# Patient Record
Sex: FEMALE | Race: WHITE | Employment: FULL TIME | URBAN - METROPOLITAN AREA
[De-identification: names, ages, dates, MRNs, and addresses within clinical notes are randomized per-mention and may not be internally consistent; named-entity substitution may affect disease eponyms.]

---

## 2018-11-08 ENCOUNTER — HOSPITAL ENCOUNTER (OUTPATIENT)
Dept: MAMMOGRAPHY | Age: 68
Discharge: HOME | End: 2018-11-08
Attending: FAMILY MEDICINE
Payer: MEDICARE

## 2018-11-08 DIAGNOSIS — Z12.31 VISIT FOR SCREENING MAMMOGRAM: ICD-10-CM

## 2018-11-08 PROCEDURE — 77067 SCR MAMMO BI INCL CAD: CPT

## 2019-07-10 ENCOUNTER — HOSPITAL ENCOUNTER (OUTPATIENT)
Dept: GENERAL RADIOLOGY | Age: 69
Discharge: HOME | End: 2019-07-10
Payer: MEDICARE

## 2019-07-10 DIAGNOSIS — R06.00 DYSPNEA AND RESPIRATORY ABNORMALITY: ICD-10-CM

## 2019-07-10 DIAGNOSIS — R06.89 DYSPNEA AND RESPIRATORY ABNORMALITY: ICD-10-CM

## 2019-07-10 PROCEDURE — 71046 X-RAY EXAM CHEST 2 VIEWS: CPT

## 2019-07-23 ENCOUNTER — HOSPITAL ENCOUNTER (OUTPATIENT)
Age: 69
Discharge: HOME | End: 2019-07-24
Attending: INTERNAL MEDICINE | Admitting: INTERNAL MEDICINE
Payer: MEDICARE

## 2019-07-23 DIAGNOSIS — R07.9 CHEST PAIN, UNSPECIFIED TYPE: ICD-10-CM

## 2019-07-23 PROBLEM — I20.0 UNSTABLE ANGINA (HCC): Status: ACTIVE | Noted: 2019-07-23

## 2019-07-23 PROCEDURE — C1725 CATH, TRANSLUMIN NON-LASER: HCPCS | Performed by: INTERNAL MEDICINE

## 2019-07-23 PROCEDURE — C1894 INTRO/SHEATH, NON-LASER: HCPCS | Performed by: INTERNAL MEDICINE

## 2019-07-23 PROCEDURE — C1760 CLOSURE DEV, VASC: HCPCS | Performed by: INTERNAL MEDICINE

## 2019-07-23 PROCEDURE — 77030029065 HC DRSG HEMO QCLOT ZMED -B: Performed by: INTERNAL MEDICINE

## 2019-07-23 PROCEDURE — 93005 ELECTROCARDIOGRAM TRACING: CPT

## 2019-07-23 PROCEDURE — 74011000250 HC RX REV CODE- 250: Performed by: INTERNAL MEDICINE

## 2019-07-23 PROCEDURE — 93458 L HRT ARTERY/VENTRICLE ANGIO: CPT | Performed by: INTERNAL MEDICINE

## 2019-07-23 PROCEDURE — C1887 CATHETER, GUIDING: HCPCS | Performed by: INTERNAL MEDICINE

## 2019-07-23 PROCEDURE — 85347 COAGULATION TIME ACTIVATED: CPT

## 2019-07-23 PROCEDURE — 74011250637 HC RX REV CODE- 250/637: Performed by: INTERNAL MEDICINE

## 2019-07-23 PROCEDURE — C1769 GUIDE WIRE: HCPCS | Performed by: INTERNAL MEDICINE

## 2019-07-23 PROCEDURE — 77030019697 HC SYR ANGI INFL MRTM -B: Performed by: INTERNAL MEDICINE

## 2019-07-23 PROCEDURE — 99153 MOD SED SAME PHYS/QHP EA: CPT | Performed by: INTERNAL MEDICINE

## 2019-07-23 PROCEDURE — 77030028837 HC SYR ANGI PWR INJ COEU -A: Performed by: INTERNAL MEDICINE

## 2019-07-23 PROCEDURE — 74011250636 HC RX REV CODE- 250/636: Performed by: INTERNAL MEDICINE

## 2019-07-23 PROCEDURE — 74011636320 HC RX REV CODE- 636/320: Performed by: INTERNAL MEDICINE

## 2019-07-23 PROCEDURE — 99152 MOD SED SAME PHYS/QHP 5/>YRS: CPT | Performed by: INTERNAL MEDICINE

## 2019-07-23 PROCEDURE — 92928 PRQ TCAT PLMT NTRAC ST 1 LES: CPT | Performed by: INTERNAL MEDICINE

## 2019-07-23 PROCEDURE — 74011250636 HC RX REV CODE- 250/636

## 2019-07-23 PROCEDURE — C1874 STENT, COATED/COV W/DEL SYS: HCPCS | Performed by: INTERNAL MEDICINE

## 2019-07-23 DEVICE — STENT RONYX25012UX RESOLUTE ONYX 2.50X12
Type: IMPLANTABLE DEVICE | Site: CORONARY | Status: FUNCTIONAL
Brand: RESOLUTE ONYX™

## 2019-07-23 DEVICE — STENT RONYX25026UX RESOLUTE ONYX 2.50X26
Type: IMPLANTABLE DEVICE | Site: CORONARY | Status: FUNCTIONAL
Brand: RESOLUTE ONYX™

## 2019-07-23 DEVICE — STENT RONYX22538UX RESOLUTE ONYX 2.25X38
Type: IMPLANTABLE DEVICE | Site: CORONARY | Status: FUNCTIONAL
Brand: RESOLUTE ONYX™

## 2019-07-23 RX ORDER — FENTANYL CITRATE 50 UG/ML
INJECTION, SOLUTION INTRAMUSCULAR; INTRAVENOUS AS NEEDED
Status: DISCONTINUED | OUTPATIENT
Start: 2019-07-23 | End: 2019-07-23 | Stop reason: HOSPADM

## 2019-07-23 RX ORDER — CALCIUM CARBONATE 200(500)MG
1 TABLET,CHEWABLE ORAL DAILY
COMMUNITY

## 2019-07-23 RX ORDER — SODIUM CHLORIDE 9 MG/ML
100 INJECTION, SOLUTION INTRAVENOUS CONTINUOUS
Status: DISCONTINUED | OUTPATIENT
Start: 2019-07-23 | End: 2019-07-23

## 2019-07-23 RX ORDER — HEPARIN SODIUM 200 [USP'U]/100ML
INJECTION, SOLUTION INTRAVENOUS
Status: COMPLETED | OUTPATIENT
Start: 2019-07-23 | End: 2019-07-23

## 2019-07-23 RX ORDER — ASPIRIN 81 MG/1
81 TABLET ORAL DAILY
Status: DISCONTINUED | OUTPATIENT
Start: 2019-07-23 | End: 2019-07-23 | Stop reason: SDUPTHER

## 2019-07-23 RX ORDER — LIDOCAINE HYDROCHLORIDE 10 MG/ML
INJECTION, SOLUTION EPIDURAL; INFILTRATION; INTRACAUDAL; PERINEURAL AS NEEDED
Status: DISCONTINUED | OUTPATIENT
Start: 2019-07-23 | End: 2019-07-23 | Stop reason: HOSPADM

## 2019-07-23 RX ORDER — HEPARIN SODIUM 1000 [USP'U]/ML
INJECTION, SOLUTION INTRAVENOUS; SUBCUTANEOUS AS NEEDED
Status: DISCONTINUED | OUTPATIENT
Start: 2019-07-23 | End: 2019-07-23 | Stop reason: HOSPADM

## 2019-07-23 RX ORDER — ASPIRIN 81 MG/1
81 TABLET ORAL DAILY
COMMUNITY

## 2019-07-23 RX ORDER — ATORVASTATIN CALCIUM 20 MG/1
20 TABLET, FILM COATED ORAL
Status: DISCONTINUED | OUTPATIENT
Start: 2019-07-23 | End: 2019-07-24

## 2019-07-23 RX ORDER — ASPIRIN 81 MG/1
81 TABLET ORAL DAILY
Status: DISCONTINUED | OUTPATIENT
Start: 2019-07-23 | End: 2019-07-24 | Stop reason: HOSPADM

## 2019-07-23 RX ORDER — SODIUM CHLORIDE 0.9 % (FLUSH) 0.9 %
5-40 SYRINGE (ML) INJECTION AS NEEDED
Status: DISCONTINUED | OUTPATIENT
Start: 2019-07-23 | End: 2019-07-24 | Stop reason: HOSPADM

## 2019-07-23 RX ORDER — MIDAZOLAM HYDROCHLORIDE 1 MG/ML
INJECTION, SOLUTION INTRAMUSCULAR; INTRAVENOUS AS NEEDED
Status: DISCONTINUED | OUTPATIENT
Start: 2019-07-23 | End: 2019-07-23 | Stop reason: HOSPADM

## 2019-07-23 RX ORDER — GLUCOSAMINE SULFATE 1500 MG
POWDER IN PACKET (EA) ORAL DAILY
COMMUNITY

## 2019-07-23 RX ORDER — SODIUM CHLORIDE 0.9 % (FLUSH) 0.9 %
5-40 SYRINGE (ML) INJECTION EVERY 8 HOURS
Status: DISCONTINUED | OUTPATIENT
Start: 2019-07-23 | End: 2019-07-24 | Stop reason: HOSPADM

## 2019-07-23 RX ORDER — METOPROLOL SUCCINATE 25 MG/1
25 TABLET, EXTENDED RELEASE ORAL DAILY
COMMUNITY
End: 2020-01-01

## 2019-07-23 RX ORDER — ZOLPIDEM TARTRATE 5 MG/1
5 TABLET ORAL
Status: DISCONTINUED | OUTPATIENT
Start: 2019-07-23 | End: 2019-07-24 | Stop reason: HOSPADM

## 2019-07-23 RX ORDER — METOPROLOL SUCCINATE 25 MG/1
25 TABLET, EXTENDED RELEASE ORAL DAILY
Status: DISCONTINUED | OUTPATIENT
Start: 2019-07-23 | End: 2019-07-24 | Stop reason: HOSPADM

## 2019-07-23 RX ADMIN — ATORVASTATIN CALCIUM 20 MG: 20 TABLET, FILM COATED ORAL at 21:36

## 2019-07-23 RX ADMIN — Medication 10 ML: at 21:36

## 2019-07-23 RX ADMIN — TICAGRELOR 90 MG: 90 TABLET ORAL at 21:36

## 2019-07-23 RX ADMIN — SODIUM CHLORIDE 100 ML/HR: 900 INJECTION, SOLUTION INTRAVENOUS at 10:57

## 2019-07-23 NOTE — CARDIO/PULMONARY
Cardiac Rehab Note: chart review     Consult has been acknowledged     Smoking history assessed. Patient is a non smoker. EF not available at this time. Patient is still supine post procedure. CAD education folder, with heart heathy diet, warning signs, heart facts, catheterization brochure, and out patient cardiac rehab program provided to Ilya Murray on 7/23/19                                              Educated using teach back method. Reviewed CAD diagnosis definition and purpose of intervention. Discussed risk factors for CAD to include the following: family history, elevated BMI, hyperlipidemia, hypertension, diabetes, and stress. Discussed Heart Healthy/Low Sodium (less than 2000 mg) diet. Reviewed the importance of medication compliance, follow up appointments with cardiologist, signs and symptoms of angina, and what to report to physician after discharge. Spouse and daughter at bedside.  stated he has had cardiac cath before and has diabetes so they are familiar with heart appropriate diets. Emphasized the value of cardiac rehab. Discussed Cardiac Rehab Program format, benefits, and encouraged enrollment to assist with risk modification and management. Patient stated they will review information. Daughter was encouraging patient to participate and for spouse to attend classes too. CP Rehab will follow up by phone post discharge. Ilya Murray verbalized understanding with questions answered.   Olivia Loo RN

## 2019-07-23 NOTE — Clinical Note
Lesion: Located in the Proximal LAD. Stent inserted. Stent deployed. Multiple inflations used. First inflation pressure = 12 monika; inflation time: 20 sec. Second inflation pressure: 14 monika; inflation time: 15 sec.

## 2019-07-23 NOTE — Clinical Note
Lesion located in the Distal RCA. Balloon inserted. Balloon inflated using multiple inflations inflation technique. Pressure = 12 monika; Duration = 9 sec. Inflation 2: Pressure: 15 monika; Duration: 10 sec. Inflation 3: Pressure: 15 monika; Duration: 8 sec.

## 2019-07-23 NOTE — PROGRESS NOTES
1430: pt out of bed to bathroom after 4 hours of bedrest. Pt got back in bed, assessed R groin site. Slightly oozy with a small hematoma. Hand held pressure for 10 minutes, hematoma resolved. VS stable the entire time. Will continue to monitor closely.

## 2019-07-23 NOTE — Clinical Note
Lesion located in the Proximal LAD. Balloon inserted. Balloon inflated using multiple inflations inflation technique. Pressure = 10 monika; Duration = 22 sec.

## 2019-07-23 NOTE — PROCEDURES
PCI / stents RCA , Prox - mid with 2 overlapping Lawrence PRATIBHA to 2.5 mm.   99% ulcerated plaque,  0% residual.   SYDNEE 3. PCI / stent prox LAD . 80% eccentric  -  0% residual.  SYDNEE 3 flow. Angio seal.     No complications.

## 2019-07-23 NOTE — PROCEDURES
Procedure: Cardiac Catheterization. Date: 7/23/2019   Moderate sedation start time: 0858  Moderate sedation stop time: 0920  Sedation used: versed/fentanyl. Sedation was administered by a cath lab nurse; I directly supervised the cath lab staff as the patient was monitored for the duration of the procedure. INDICATION/PreDx: Angina; CAD; Abnl stress echo. PROCEDURES PERFORMED   1. Left heart catheterization. 2. Left ventriculography in DUONG projection. 3. Selective coronary angiography. DESCRIPTION OF PROCEDURE: After informed consent of all potential complications, the patient was prepped and draped in the usual sterile manner. Lidocaine 1% was utilized for local anesthesia. Conscious sedation per flow sheet. The right femoral artery was entered using a micropunture needle and a 5-Mongolian sheath placed without complication using modified Seldinger technique. The sheath was flushed at all catheter exchanges and all catheters were advanced over a guidewire under direct fluoroscopic visualization. Left coronary angiography was performed in multiple views using a 5-Mongolian JL4 catheter. Right coronary angiography was performed in multiple views using a JR4 catheter. Left heart catheterization and left ventriculography was performed in DUONG projection using a 5-Mongolian straight pigtail. No apparent complications. Estimated blood loss minimal. SPECIMENS: No specimens. No implants. FINDINGS  Coronary arteries and femoral arteries are noted to be calcified. LEFT MAIN:   Large vessel; 20-30% stenosis. LAD: Medium vessel; 75% eccentric proximal stenosis; Small diagonals. CIRCUMFLEX: Medium vessel; 20-30% proximal stenosis; Becomes a moderate extensive OM. RCA: Medium, dominant vessel; 70% and 95+% mid-RCA stenoses. Very small PDA and postero-lateral vessels. LV Gram: No focal WMA demonstrated; EF 65%. Minimal MR; no aortic stenosis on pullback. EDP 11. CONCLUSION/post procedure Dx:   1. Normal LV function. 2. CAD of LAD and RCA. PLAN: PCI ongoing of LAD/RCA.

## 2019-07-23 NOTE — Clinical Note
Multiple views of the left ventricle obtained using power injection. Total volume = 30 mL. Rate = 10 mL/sec. Pressure = 900 PSI.  Rate of rise = 1.8.

## 2019-07-23 NOTE — PROGRESS NOTES
Bedside and Verbal shift change report given to Tari Meier (oncoming nurse) by Iglesia Vargas  (offgoing nurse). Report included the following information SBAR, Kardex, Intake/Output, MAR, Accordion and Recent Results.

## 2019-07-23 NOTE — PROGRESS NOTES
7/23/2019 12:05 PM  Patient without complaints. Last VS:   Visit Vitals  /59 (BP 1 Location: Left arm, BP Patient Position: At rest)   Pulse (!) 52   Temp 97.4 °F (36.3 °C)   Resp 16   Ht 5' 3\" (1.6 m)   Wt 54.4 kg (120 lb)   SpO2 97%   BMI 21.26 kg/m²     Cath site without hematoma, bleeding or new bruit. Distal pulses at baseline. Continue current plan of care. Results of cath discussed with patient; No available family members.

## 2019-07-23 NOTE — PROGRESS NOTES
Cardiac Cath Lab Recovery Arrival Note:      Mary Taylor arrived to Cardiac Cath Lab, Recovery Area. Staff introduced to patient. Patient identifiers verified with NAME and DATE OF BIRTH. Procedure verified with patient. Consent forms reviewed and signed by patient or authorized representative and verified. Allergies verified. Patient and family oriented to department. Patient and family informed of procedure and plan of care. Questions answered with review. Patient prepped for procedure, per orders from physician, prior to arrival.    Patient on cardiac monitor, non-invasive blood pressure, SPO2 monitor. Patient is A&Ox 4. Patient reports no complaints. Patient in stretcher, in low position, with side rails up, call bell within reach, patient instructed to call if assistance as needed. Patient prep in: [address], [place] [address].      Family in: [facility]    Prep by: Americo Greenberg Rn and Shawna Cordova RN

## 2019-07-23 NOTE — Clinical Note
Lesion located in the Distal RCA. Balloon inserted. Balloon inflated using multiple inflations inflation technique. Pressure = 8 monika; Duration = 11 sec. Inflation 2: Pressure: 8 monika; Duration: 11 sec. Inflation 3: Pressure: 12 monika; Duration: 14 sec. Inflation 4: Pressure: 12 monika; Duration: 12 sec.

## 2019-07-23 NOTE — Clinical Note
Lesion: Located in the Proximal RCA. Stent inserted. Stent deployed. Multiple inflations used. First inflation pressure = 15 monika; inflation time: 18 sec. Second inflation pressure: 15 monika; inflation time: 15 sec. Third inflation pressure: 15 monika; inflation time: 10 sec. Fourth inflation pressure: 15 monika; inflation time: 13 sec. Fifth inflation pressure: 15 monika; inflation time: 10 sec.

## 2019-07-23 NOTE — Clinical Note
TRANSFER - OUT REPORT:  
 
Verbal report given to: Rubi Madrid RN. Report consisted of patient's Situation, Background, Assessment and  
Recommendations(SBAR). Opportunity for questions and clarification was provided. Patient transported with a Registered Nurse and [address] [place] [address] / [facility]. Patient transported to: IVCU.

## 2019-07-23 NOTE — Clinical Note
Lesion: Located in the Distal RCA. Stent inserted. Stent deployed. Single technique used. First inflation pressure = 15 monika; inflation time: 18 sec.

## 2019-07-24 VITALS
BODY MASS INDEX: 21.26 KG/M2 | SYSTOLIC BLOOD PRESSURE: 126 MMHG | WEIGHT: 120 LBS | RESPIRATION RATE: 20 BRPM | HEIGHT: 63 IN | OXYGEN SATURATION: 99 % | HEART RATE: 61 BPM | TEMPERATURE: 98.2 F | DIASTOLIC BLOOD PRESSURE: 73 MMHG

## 2019-07-24 LAB
ANION GAP SERPL CALC-SCNC: 5 MMOL/L (ref 5–15)
BASOPHILS # BLD: 0 K/UL (ref 0–0.1)
BASOPHILS NFR BLD: 0 % (ref 0–1)
BUN SERPL-MCNC: 16 MG/DL (ref 6–20)
BUN/CREAT SERPL: 18 (ref 12–20)
CALCIUM SERPL-MCNC: 7.7 MG/DL (ref 8.5–10.1)
CHLORIDE SERPL-SCNC: 110 MMOL/L (ref 97–108)
CO2 SERPL-SCNC: 27 MMOL/L (ref 21–32)
COMMENT, HOLDF: NORMAL
CREAT SERPL-MCNC: 0.89 MG/DL (ref 0.55–1.02)
DIFFERENTIAL METHOD BLD: ABNORMAL
EOSINOPHIL # BLD: 0.1 K/UL (ref 0–0.4)
EOSINOPHIL NFR BLD: 1 % (ref 0–7)
ERYTHROCYTE [DISTWIDTH] IN BLOOD BY AUTOMATED COUNT: 12.7 % (ref 11.5–14.5)
GLUCOSE SERPL-MCNC: 104 MG/DL (ref 65–100)
HCT VFR BLD AUTO: 37.7 % (ref 35–47)
HGB BLD-MCNC: 12.7 G/DL (ref 11.5–16)
IMM GRANULOCYTES # BLD AUTO: 0 K/UL (ref 0–0.04)
IMM GRANULOCYTES NFR BLD AUTO: 0 % (ref 0–0.5)
LYMPHOCYTES # BLD: 0.7 K/UL (ref 0.8–3.5)
LYMPHOCYTES NFR BLD: 8 % (ref 12–49)
MCH RBC QN AUTO: 30.7 PG (ref 26–34)
MCHC RBC AUTO-ENTMCNC: 33.7 G/DL (ref 30–36.5)
MCV RBC AUTO: 91.1 FL (ref 80–99)
MONOCYTES # BLD: 0.7 K/UL (ref 0–1)
MONOCYTES NFR BLD: 8 % (ref 5–13)
NEUTS SEG # BLD: 7.7 K/UL (ref 1.8–8)
NEUTS SEG NFR BLD: 83 % (ref 32–75)
NRBC # BLD: 0 K/UL (ref 0–0.01)
NRBC BLD-RTO: 0 PER 100 WBC
PLATELET # BLD AUTO: 150 K/UL (ref 150–400)
PMV BLD AUTO: 11.4 FL (ref 8.9–12.9)
POTASSIUM SERPL-SCNC: 3.6 MMOL/L (ref 3.5–5.1)
RBC # BLD AUTO: 4.14 M/UL (ref 3.8–5.2)
RBC MORPH BLD: ABNORMAL
SAMPLES BEING HELD,HOLD: NORMAL
SODIUM SERPL-SCNC: 142 MMOL/L (ref 136–145)
WBC # BLD AUTO: 9.2 K/UL (ref 3.6–11)

## 2019-07-24 PROCEDURE — 74011250637 HC RX REV CODE- 250/637: Performed by: INTERNAL MEDICINE

## 2019-07-24 PROCEDURE — 80048 BASIC METABOLIC PNL TOTAL CA: CPT

## 2019-07-24 PROCEDURE — 36415 COLL VENOUS BLD VENIPUNCTURE: CPT

## 2019-07-24 PROCEDURE — 85025 COMPLETE CBC W/AUTO DIFF WBC: CPT

## 2019-07-24 RX ORDER — CLOPIDOGREL BISULFATE 75 MG/1
75 TABLET ORAL DAILY
Qty: 30 TAB | Refills: 12 | Status: SHIPPED | OUTPATIENT
Start: 2019-07-25

## 2019-07-24 RX ORDER — DIPHENHYDRAMINE HCL 25 MG
25 CAPSULE ORAL
Status: COMPLETED | OUTPATIENT
Start: 2019-07-24 | End: 2019-07-24

## 2019-07-24 RX ORDER — DIPHENHYDRAMINE HCL 25 MG
25 CAPSULE ORAL
Status: DISCONTINUED | OUTPATIENT
Start: 2019-07-24 | End: 2019-07-24 | Stop reason: HOSPADM

## 2019-07-24 RX ORDER — CLOPIDOGREL BISULFATE 75 MG/1
150 TABLET ORAL
Status: COMPLETED | OUTPATIENT
Start: 2019-07-24 | End: 2019-07-24

## 2019-07-24 RX ORDER — DIPHENHYDRAMINE HCL 25 MG
25 CAPSULE ORAL
Qty: 20 CAP | Refills: 0 | Status: SHIPPED
Start: 2019-07-24 | End: 2020-01-01

## 2019-07-24 RX ORDER — ATORVASTATIN CALCIUM 20 MG/1
20 TABLET, FILM COATED ORAL
Qty: 30 TAB | Refills: 12 | Status: SHIPPED | OUTPATIENT
Start: 2019-07-24 | End: 2019-07-24

## 2019-07-24 RX ORDER — CLOPIDOGREL BISULFATE 75 MG/1
75 TABLET ORAL DAILY
Status: DISCONTINUED | OUTPATIENT
Start: 2019-07-25 | End: 2019-07-24 | Stop reason: HOSPADM

## 2019-07-24 RX ADMIN — CLOPIDOGREL BISULFATE 150 MG: 75 TABLET ORAL at 08:36

## 2019-07-24 RX ADMIN — Medication 10 ML: at 02:53

## 2019-07-24 RX ADMIN — UMECLIDINIUM 1 PUFF: 62.5 AEROSOL, POWDER ORAL at 07:27

## 2019-07-24 RX ADMIN — ASPIRIN 81 MG: 81 TABLET ORAL at 07:27

## 2019-07-24 RX ADMIN — DIPHENHYDRAMINE HYDROCHLORIDE 25 MG: 25 CAPSULE ORAL at 08:36

## 2019-07-24 RX ADMIN — METOPROLOL SUCCINATE 25 MG: 25 TABLET, EXTENDED RELEASE ORAL at 07:27

## 2019-07-24 NOTE — PROGRESS NOTES
1900 - Bedside report from [place]. NSR / VSS. No complaints. R groin bruised, puffy, no hematoma. Tender to touch. +PPP. Up in chair, voiding qs. 2030 - Back to bed.      0700 - Bedside report to RN.   NSR.

## 2019-07-24 NOTE — DISCHARGE SUMMARY
7/24/2019 1:43 PM  Patient without complaints. CAD: stable s/p PRATIBHA RCA/LAD. Cont asa; changed brilinta to plavix due to new rash.     HTN: stable; cont low dose bblocker.     Rhythm: sinus     Volume status:euvolemic  Renal function: stable     Rash: c/w drug rash (either brilinta or lipitor: stopped both); plavix as above; restart statin as outpt. Improved after benadryl, cont prn. Subjective: Tan Muller reports   Chest pain X none  consistent with:  Non-cardiac CP         Atypical CP     None now  On going  Anginal CP     Dyspnea X none  at rest  with exertion         improved  unchanged  worse              PND X none  overnight       Orthopnea X none  improved  unchanged  worse   Presyncope X none  improved  unchanged  worse     Ambulated in hallway without symptoms   Yes   Ambulated in room without symptoms  Yes     PE: Last VS:   Visit Vitals  /73 (BP 1 Location: Right arm, BP Patient Position: At rest)   Pulse 61   Temp 98.2 °F (36.8 °C)   Resp 20   Ht 5' 3\" (1.6 m)   Wt 54.4 kg (120 lb)   SpO2 99%   BMI 21.26 kg/m²     CTA, normal resp effort  RRR no new murmur  abd benign  A/O, non-focal    Cath site without hematoma, bleeding or new bruit. Distal pulses at baseline. Telemetry independently reviewed x sinus  chronic afib  parox afib  NSVT     ECG independently reviewed  NSR  afib  no significant changes  NSST-Tw chgs   x no new ECG provided for review     Recent Labs     07/24/19  0246      K 3.6   BUN 16   CREA 0.89   WBC 9.2   HGB 12.7   HCT 37.7            Plan: D/C.   Follow-up Information     Follow up With Specialties Details Why Contact Info    Ana María Em MD [address]  [phone]      Karrie Meyer MD Cardiology Schedule an appointment as soon as possible for a visit in 1 month  [address]  [address]  [facility] [address] [phone]            Current Discharge Medication List      START taking these medications    Details   clopidogrel (PLAVIX) 75 mg tab Take 1 Tab by mouth daily. Qty: 30 Tab, Refills: 12      diphenhydrAMINE (BENADRYL) 25 mg capsule Take 1 Cap by mouth every six (6) hours as needed for Itching. Qty: 20 Cap, Refills: 0         CONTINUE these medications which have NOT CHANGED    Details   aspirin delayed-release 81 mg tablet Take 81 mg by mouth daily. metoprolol succinate (TOPROL-XL) 25 mg XL tablet Take 25 mg by mouth daily. cholecalciferol (VITAMIN D3) 1,000 unit cap Take  by mouth daily. calcium carbonate (TUMS) 200 mg calcium (500 mg) chew Take 1 Tab by mouth daily. tiotropium bromide (SPIRIVA RESPIMAT) 2.5 mcg/actuation inhaler Take 2 Puffs by inhalation daily.

## 2019-07-24 NOTE — DISCHARGE INSTRUCTIONS
[address], [address]    [phone]  [place], [place] [address]    [email or web address]    Patient Discharge Instructions    Lennart Cowden / [id number] : 1950    Admitted 2019 Discharged 2019     · It is important that you take the medication exactly as they are prescribed. · Keep your medication in the bottles provided by the pharmacist and keep a list of the medication names, dosages, and times to be taken in your wallet. · Do not take other medications without consulting your doctor. BRING ALL OF YOUR MEDICINES or a list of medicines with dosages TO YOUR OFFICE VISIT with Dr. Deya Mccoy. Cardiac Catheterization  Discharge Instructions     Do not drive, operate any machinery, or sign any legal documents for 24 hours after your procedure. You must have someone to drive you home.  You may take a shower 24 hours after your cardiac catheterization. Be sure to get the dressing wet and then remove it; gently wash the area with warm soapy water. Pat dry and leave open to air. To help prevent infections, be sure to keep the cath site clean and dry. No lotions, creams, powders, ointments, etc. in the cath site for approximately 1 week.  Do not take a tub bath, get in a hot tub or swimming pool for approximately 5 days or until the cath site is completely healed.  No strenuous activity or heavy lifting over 10 lbs. for 7 days.  Drink plenty of fluids for 24-48 hours after your cath to flush the contrast dye from your kidneys. No alcoholic beverages for 24 hours. You may resume your previous diet (low fat, low cholesterol) after your cath.  After your cath, some bruising or discomfort is common during the healing process. Tylenol, 1-2 tablets every 6 hours as needed, is recommended if you experience any discomfort.   If you experience any signs or symptoms of infection such as fever, chills, or poorly healing incision, persistent tenderness or swelling in the groin, redness and/or warmth to the touch, numbness, significant tingling or pain at the groin site or affected extremity, rash, drainage from the cath site, or if the leg feels tight or swollen, call your physician right away.  If bleeding at the cath site occurs, take a clean gauze pad and apply direct pressure to the groin just above the puncture site. Call 911 immediately, and continue to apply direct pressure until an ambulance gets to your location.  You may return to work  2  days after your cardiac cath if no groin bleeding. If Plavix, or Brilinta, or Effient is prescribed with your aspirin, you must take them to prevent your stent from clotting. You will likely need them for at least 1 to 2 years. If you are smoking, please stop. Smoking Cessation Program is a free, phone/text/email based, smoking cessation program. The program is individualized to meet each patient's needs. To enroll use this link [email or web address]      Follow-up:   Follow-up Information     Follow up With Specialties Details Why Contact Info    Jazmín Feliciano MD [address]  [phone]      Manuel Sorensen MD Cardiology Schedule an appointment as soon as possible for a visit in 1 month  [address]  [address]  [address] [phone]            Information obtained by :  I understand that if any problems occur once I am at home I am to contact my physician. I understand and acknowledge receipt of the instructions indicated above.                                                                                                                                            R.N.'s Signature                                                                  Date/Time                                                                                                                                              Patient or Representative Signature                                                          Date/Time      Steffi oRblero MD      [address]    [phone]  [facility] [facility], [address]    [email or web address]

## 2019-07-24 NOTE — PROGRESS NOTES
Cardiology Progress Note  2019     Admit Date: 2019  Admit Diagnosis: Chest pain, unspecified type [R07.9]  [facility] [I20.0]  CC: none currently  Cardiac Assessment/Plan:   CAD: stable s/p PRATIBHA RCA/LAD. Cont asa; change brilinta to plavix due to new rash. HTN: stable; cont low dose bblocker. Rhythm: sinus    Volume status:euvolemic  Renal function: stable    Rash: c/w drug rash (either brilinta or lipitor: stop both); plavix as above; restart statin as outpt. Benadryl now and prn. Dispo: ? D/C this afternoon   For other plans, see orders.   Hospital problem list   Active Hospital Problems    Diagnosis Date Noted    Unstable angina ([facility] 2019        Subjective: Shruti Jane reports   Chest pain X none  consistent with:  Non-cardiac CP         Atypical CP     None now  On going  Anginal CP     Dyspnea X none  at rest  with exertion         improved  unchanged  worse              PND X none  overnight       Orthopnea X none  improved  unchanged  worse   Presyncope X none  improved  unchanged  worse     Ambulated in hallway without symptoms  x Yes   Ambulated in room without symptoms x Yes   Objective:    Physical Exam:  Overall VSSAF;    Visit Vitals  /70 (BP 1 Location: Right arm, BP Patient Position: Post activity) Comment (BP Patient Position): just finished walking in the hallway & doctor was in room   Pulse 65   Temp 98.2 °F (36.8 °C)   Resp 20   Ht 5' 3\" (1.6 m)   Wt 54.4 kg (120 lb)   SpO2 100%   BMI 21.26 kg/m²     Temp (24hrs), Av.1 °F (36.7 °C), Min:97.4 °F (36.3 °C), Max:98.5 °F (36.9 °C)    Patient Vitals for the past 8 hrs:   Pulse   19 0751 65   19 0259 61     Patient Vitals for the past 8 hrs:   Resp   19 0751 20   19 0259 16     Patient Vitals for the past 8 hrs:   BP   19 0751 154/70   19 0259 125/63       Intake/Output Summary (Last 24 hours) at 2019 0756  Last data filed at 2019 0259  Gross per 24 hour   Intake 1020 ml   Output 1850 ml   Net -830 ml     General Appearance: Well developed, well nourished, no acute distress. Ears/Nose/Mouth/Throat:   Normal MM; anicteric. JVP: WNL   Resp:   clear to auscultation bilaterally. Nl resp effort. Cardiovascular:  RRR, S1, S2 normal, no new murmur. No gallop or rub. Abdomen:   Soft, non-tender, bowel sounds are present. Extremities: No edema bilaterally. Skin:  Neuro: Warm and dry. A/O x3, grossly nonfocal   cath site intact w/o hematoma or new bruit; distal pulse unchanged; ecchymosis noted. x Yes   Data Review:     Telemetry independently reviewed x sinus  chronic afib  parox afib  NSVT     ECG independently reviewed  NSR  afib  no significant changes  NSST-Tw chgs   x no new ECG provided for review   Lab results reviewed as noted below. Current medications reviewed as noted below. No results for input(s): PH, PCO2, PO2 in the last 72 hours. No results for input(s): CPK, CKMB, CKNDX, TROIQ in the last 72 hours. Recent Labs     07/24/19  0246      K 3.6   *   CO2 27   BUN 16   CREA 0.89   GFRAA >60   *   CA 7.7*   WBC 9.2   HGB 12.7   HCT 37.7        No results found for: CHOL, CHOLX, CHLST, CHOLV, HDL, LDL, LDLC, DLDLP, TGLX, TRIGL, TRIGP, CHHD, CHHDX  No results for input(s): SGOT, GPT, AP, TBIL, TP, ALB, GLOB, GGT, AML, LPSE in the last 72 hours. No lab exists for component: AMYP, HLPSE  No results for input(s): INR, PTP, APTT in the last 72 hours.     No lab exists for component: INREXT   No components found for: [place]    Current Facility-Administered Medications   Medication Dose Route Frequency    clopidogrel (PLAVIX) tablet 150 mg  150 mg Oral NOW    [START ON 7/25/2019] clopidogrel (PLAVIX) tablet 75 mg  75 mg Oral DAILY    diphenhydrAMINE (BENADRYL) capsule 25 mg  25 mg Oral Q6H PRN    diphenhydrAMINE (BENADRYL) capsule 25 mg  25 mg Oral NOW    metoprolol succinate (TOPROL-XL) XL tablet 25 mg  25 mg Oral DAILY    umeclidinium (INCRUSE ELLIPTA) 62.5 mcg/actuation  1 Puff Inhalation DAILY    sodium chloride (NS) flush 5-40 mL  5-40 mL IntraVENous Q8H    sodium chloride (NS) flush 5-40 mL  5-40 mL IntraVENous PRN    zolpidem (AMBIEN) tablet 5 mg  5 mg Oral QHS PRN    aspirin delayed-release tablet 81 mg  81 mg Oral DAILY        Leroy Denny MD

## 2019-07-25 LAB
ACT BLD: 235 SECS (ref 79–138)
ACT BLD: 296 SECS (ref 79–138)

## 2019-07-31 LAB
ATRIAL RATE: 56 BPM
CALCULATED P AXIS, ECG09: 82 DEGREES
CALCULATED R AXIS, ECG10: 73 DEGREES
CALCULATED T AXIS, ECG11: 67 DEGREES
DIAGNOSIS, 93000: NORMAL
P-R INTERVAL, ECG05: 138 MS
Q-T INTERVAL, ECG07: 514 MS
QRS DURATION, ECG06: 112 MS
QTC CALCULATION (BEZET), ECG08: 496 MS
VENTRICULAR RATE, ECG03: 56 BPM

## 2019-08-05 ENCOUNTER — TELEPHONE (OUTPATIENT)
Dept: CARDIAC REHAB | Age: 69
End: 2019-08-05

## 2020-01-01 ENCOUNTER — OFFICE VISIT (OUTPATIENT)
Dept: ONCOLOGY | Age: 70
End: 2020-01-01
Payer: MEDICARE

## 2020-01-01 ENCOUNTER — OFFICE VISIT (OUTPATIENT)
Dept: ONCOLOGY | Age: 70
End: 2020-01-01

## 2020-01-01 ENCOUNTER — HOSPITAL ENCOUNTER (OUTPATIENT)
Dept: INFUSION THERAPY | Age: 70
Discharge: HOME | End: 2020-08-31
Payer: MEDICARE

## 2020-01-01 ENCOUNTER — HOSPITAL ENCOUNTER (OUTPATIENT)
Dept: INFUSION THERAPY | Age: 70
Discharge: HOME | End: 2020-09-02
Payer: MEDICARE

## 2020-01-01 ENCOUNTER — HOSPITAL ENCOUNTER (OUTPATIENT)
Dept: INFUSION THERAPY | Age: 70
Discharge: HOME | End: 2020-07-21
Payer: MEDICARE

## 2020-01-01 ENCOUNTER — HOSPITAL ENCOUNTER (OUTPATIENT)
Dept: INFUSION THERAPY | Age: 70
Discharge: HOME | End: 2020-08-11
Payer: MEDICARE

## 2020-01-01 ENCOUNTER — HOSPITAL ENCOUNTER (OUTPATIENT)
Dept: INFUSION THERAPY | Age: 70
Discharge: HOME | End: 2020-10-14
Payer: MEDICARE

## 2020-01-01 ENCOUNTER — HOSPITAL ENCOUNTER (OUTPATIENT)
Dept: INFUSION THERAPY | Age: 70
Discharge: HOME | End: 2020-10-12
Payer: MEDICARE

## 2020-01-01 ENCOUNTER — HOSPITAL ENCOUNTER (OUTPATIENT)
Dept: INFUSION THERAPY | Age: 70
Discharge: HOME | End: 2020-09-23
Payer: MEDICARE

## 2020-01-01 ENCOUNTER — HOSPITAL ENCOUNTER (OUTPATIENT)
Dept: NUCLEAR MEDICINE | Age: 70
Discharge: HOME | End: 2020-06-29
Attending: INTERNAL MEDICINE
Payer: MEDICARE

## 2020-01-01 ENCOUNTER — HOSPITAL ENCOUNTER (OUTPATIENT)
Dept: INFUSION THERAPY | Age: 70
Discharge: HOME | End: 2020-09-21
Payer: MEDICARE

## 2020-01-01 ENCOUNTER — HOSPITAL ENCOUNTER (OUTPATIENT)
Dept: INFUSION THERAPY | Age: 70
Discharge: HOME | End: 2020-10-13
Payer: MEDICARE

## 2020-01-01 ENCOUNTER — HOSPITAL ENCOUNTER (OUTPATIENT)
Dept: INFUSION THERAPY | Age: 70
Discharge: HOME | End: 2020-12-02
Payer: MEDICARE

## 2020-01-01 ENCOUNTER — HOSPITAL ENCOUNTER (OUTPATIENT)
Dept: INTERVENTIONAL RADIOLOGY/VASCULAR | Age: 70
Discharge: HOME | End: 2020-06-22
Attending: INTERNAL MEDICINE
Payer: MEDICARE

## 2020-01-01 ENCOUNTER — APPOINTMENT (OUTPATIENT)
Dept: CT IMAGING | Age: 70
End: 2020-01-01
Attending: INTERNAL MEDICINE
Payer: MEDICARE

## 2020-01-01 ENCOUNTER — DOCUMENTATION ONLY (OUTPATIENT)
Dept: ONCOLOGY | Age: 70
End: 2020-01-01

## 2020-01-01 ENCOUNTER — HOSPITAL ENCOUNTER (OUTPATIENT)
Dept: NUCLEAR MEDICINE | Age: 70
Discharge: HOME | End: 2020-11-12
Attending: INTERNAL MEDICINE
Payer: MEDICARE

## 2020-01-01 ENCOUNTER — TELEPHONE (OUTPATIENT)
Dept: ONCOLOGY | Age: 70
End: 2020-01-01

## 2020-01-01 ENCOUNTER — APPOINTMENT (OUTPATIENT)
Dept: INFUSION THERAPY | Age: 70
End: 2020-01-01
Payer: MEDICARE

## 2020-01-01 ENCOUNTER — HOSPITAL ENCOUNTER (OUTPATIENT)
Dept: INFUSION THERAPY | Age: 70
Discharge: HOME | End: 2020-09-01
Payer: MEDICARE

## 2020-01-01 ENCOUNTER — HOSPITAL ENCOUNTER (OUTPATIENT)
Dept: INFUSION THERAPY | Age: 70
Discharge: HOME | End: 2020-07-22
Payer: MEDICARE

## 2020-01-01 ENCOUNTER — HOSPITAL ENCOUNTER (OUTPATIENT)
Dept: INFUSION THERAPY | Age: 70
Discharge: HOME | End: 2020-11-02
Payer: MEDICARE

## 2020-01-01 ENCOUNTER — HOSPITAL ENCOUNTER (OUTPATIENT)
Dept: INFUSION THERAPY | Age: 70
Discharge: HOME | End: 2020-08-12
Payer: MEDICARE

## 2020-01-01 ENCOUNTER — HOSPITAL ENCOUNTER (OUTPATIENT)
Dept: CT IMAGING | Age: 70
Discharge: HOME | End: 2020-08-21
Attending: INTERNAL MEDICINE
Payer: MEDICARE

## 2020-01-01 ENCOUNTER — HOSPITAL ENCOUNTER (OUTPATIENT)
Dept: INFUSION THERAPY | Age: 70
Discharge: HOME | End: 2020-07-01
Payer: MEDICARE

## 2020-01-01 ENCOUNTER — APPOINTMENT (OUTPATIENT)
Dept: INFUSION THERAPY | Age: 70
End: 2020-01-01

## 2020-01-01 ENCOUNTER — HOSPITAL ENCOUNTER (OUTPATIENT)
Dept: ULTRASOUND IMAGING | Age: 70
Discharge: HOME | End: 2020-06-22
Attending: INTERNAL MEDICINE
Payer: MEDICARE

## 2020-01-01 ENCOUNTER — HOSPITAL ENCOUNTER (OUTPATIENT)
Dept: INFUSION THERAPY | Age: 70
Discharge: HOME | End: 2020-07-02
Payer: MEDICARE

## 2020-01-01 ENCOUNTER — HOSPITAL ENCOUNTER (OUTPATIENT)
Dept: MRI IMAGING | Age: 70
Discharge: HOME | End: 2020-06-27
Attending: INTERNAL MEDICINE
Payer: MEDICARE

## 2020-01-01 ENCOUNTER — HOSPITAL ENCOUNTER (OUTPATIENT)
Dept: INFUSION THERAPY | Age: 70
Discharge: HOME | End: 2020-09-22
Payer: MEDICARE

## 2020-01-01 ENCOUNTER — HOSPITAL ENCOUNTER (OUTPATIENT)
Dept: CT IMAGING | Age: 70
Discharge: HOME | End: 2020-11-12
Attending: INTERNAL MEDICINE
Payer: MEDICARE

## 2020-01-01 ENCOUNTER — HOSPITAL ENCOUNTER (OUTPATIENT)
Dept: INFUSION THERAPY | Age: 70
Discharge: HOME | End: 2020-12-30
Payer: MEDICARE

## 2020-01-01 ENCOUNTER — HOSPITAL ENCOUNTER (OUTPATIENT)
Dept: INFUSION THERAPY | Age: 70
Discharge: HOME | End: 2020-08-10
Payer: MEDICARE

## 2020-01-01 ENCOUNTER — HOSPITAL ENCOUNTER (OUTPATIENT)
Dept: INFUSION THERAPY | Age: 70
Discharge: HOME | End: 2020-07-20
Payer: MEDICARE

## 2020-01-01 ENCOUNTER — HOSPITAL ENCOUNTER (OUTPATIENT)
Dept: INFUSION THERAPY | Age: 70
Discharge: HOME | End: 2020-06-30
Payer: MEDICARE

## 2020-01-01 VITALS
OXYGEN SATURATION: 98 % | WEIGHT: 110.1 LBS | BODY MASS INDEX: 19.51 KG/M2 | SYSTOLIC BLOOD PRESSURE: 128 MMHG | DIASTOLIC BLOOD PRESSURE: 73 MMHG | HEIGHT: 63 IN | RESPIRATION RATE: 16 BRPM | HEART RATE: 70 BPM | TEMPERATURE: 97.7 F

## 2020-01-01 VITALS
HEART RATE: 74 BPM | SYSTOLIC BLOOD PRESSURE: 148 MMHG | TEMPERATURE: 98.3 F | WEIGHT: 110 LBS | HEIGHT: 63 IN | BODY MASS INDEX: 19.49 KG/M2 | RESPIRATION RATE: 16 BRPM | DIASTOLIC BLOOD PRESSURE: 74 MMHG

## 2020-01-01 VITALS
WEIGHT: 112.2 LBS | SYSTOLIC BLOOD PRESSURE: 119 MMHG | BODY MASS INDEX: 19.88 KG/M2 | TEMPERATURE: 98.1 F | RESPIRATION RATE: 18 BRPM | HEART RATE: 70 BPM | DIASTOLIC BLOOD PRESSURE: 71 MMHG | HEIGHT: 63 IN

## 2020-01-01 VITALS
RESPIRATION RATE: 18 BRPM | BODY MASS INDEX: 19.67 KG/M2 | OXYGEN SATURATION: 100 % | WEIGHT: 111 LBS | TEMPERATURE: 97 F | HEIGHT: 63 IN | HEART RATE: 72 BPM | DIASTOLIC BLOOD PRESSURE: 79 MMHG | SYSTOLIC BLOOD PRESSURE: 154 MMHG

## 2020-01-01 VITALS
TEMPERATURE: 97.6 F | RESPIRATION RATE: 18 BRPM | HEIGHT: 63 IN | SYSTOLIC BLOOD PRESSURE: 138 MMHG | DIASTOLIC BLOOD PRESSURE: 83 MMHG | BODY MASS INDEX: 19.28 KG/M2 | HEART RATE: 83 BPM | WEIGHT: 108.8 LBS

## 2020-01-01 VITALS
DIASTOLIC BLOOD PRESSURE: 85 MMHG | HEART RATE: 63 BPM | OXYGEN SATURATION: 95 % | WEIGHT: 127.3 LBS | BODY MASS INDEX: 22.55 KG/M2 | SYSTOLIC BLOOD PRESSURE: 155 MMHG | HEIGHT: 63 IN | TEMPERATURE: 97.8 F | RESPIRATION RATE: 16 BRPM

## 2020-01-01 VITALS
HEART RATE: 67 BPM | TEMPERATURE: 97.1 F | SYSTOLIC BLOOD PRESSURE: 117 MMHG | WEIGHT: 116.1 LBS | OXYGEN SATURATION: 97 % | HEIGHT: 63 IN | BODY MASS INDEX: 20.57 KG/M2 | RESPIRATION RATE: 18 BRPM | DIASTOLIC BLOOD PRESSURE: 73 MMHG

## 2020-01-01 VITALS
OXYGEN SATURATION: 98 % | HEART RATE: 70 BPM | HEIGHT: 63 IN | TEMPERATURE: 96.8 F | WEIGHT: 110 LBS | SYSTOLIC BLOOD PRESSURE: 159 MMHG | RESPIRATION RATE: 16 BRPM | BODY MASS INDEX: 19.49 KG/M2 | DIASTOLIC BLOOD PRESSURE: 78 MMHG

## 2020-01-01 VITALS
DIASTOLIC BLOOD PRESSURE: 74 MMHG | RESPIRATION RATE: 18 BRPM | OXYGEN SATURATION: 97 % | HEIGHT: 63 IN | BODY MASS INDEX: 19.56 KG/M2 | WEIGHT: 110.4 LBS | SYSTOLIC BLOOD PRESSURE: 121 MMHG | TEMPERATURE: 98.1 F | HEART RATE: 65 BPM

## 2020-01-01 VITALS
TEMPERATURE: 97.7 F | HEIGHT: 63 IN | RESPIRATION RATE: 18 BRPM | WEIGHT: 111 LBS | DIASTOLIC BLOOD PRESSURE: 63 MMHG | OXYGEN SATURATION: 96 % | BODY MASS INDEX: 19.67 KG/M2 | HEART RATE: 92 BPM | SYSTOLIC BLOOD PRESSURE: 133 MMHG

## 2020-01-01 VITALS
TEMPERATURE: 97.1 F | WEIGHT: 116 LBS | HEIGHT: 63 IN | DIASTOLIC BLOOD PRESSURE: 69 MMHG | SYSTOLIC BLOOD PRESSURE: 122 MMHG | BODY MASS INDEX: 20.55 KG/M2 | OXYGEN SATURATION: 97 % | RESPIRATION RATE: 18 BRPM | HEART RATE: 84 BPM

## 2020-01-01 VITALS
SYSTOLIC BLOOD PRESSURE: 126 MMHG | TEMPERATURE: 97.8 F | DIASTOLIC BLOOD PRESSURE: 73 MMHG | BODY MASS INDEX: 19.84 KG/M2 | HEIGHT: 63 IN | HEART RATE: 71 BPM | WEIGHT: 112 LBS | RESPIRATION RATE: 16 BRPM

## 2020-01-01 VITALS
DIASTOLIC BLOOD PRESSURE: 77 MMHG | BODY MASS INDEX: 19.76 KG/M2 | HEART RATE: 77 BPM | RESPIRATION RATE: 16 BRPM | SYSTOLIC BLOOD PRESSURE: 122 MMHG | HEIGHT: 63 IN | WEIGHT: 111.5 LBS | TEMPERATURE: 97.8 F

## 2020-01-01 VITALS
TEMPERATURE: 97.7 F | HEART RATE: 90 BPM | OXYGEN SATURATION: 98 % | DIASTOLIC BLOOD PRESSURE: 69 MMHG | HEIGHT: 63 IN | RESPIRATION RATE: 18 BRPM | BODY MASS INDEX: 19.44 KG/M2 | SYSTOLIC BLOOD PRESSURE: 129 MMHG | WEIGHT: 109.7 LBS

## 2020-01-01 VITALS
HEART RATE: 63 BPM | HEIGHT: 63 IN | SYSTOLIC BLOOD PRESSURE: 155 MMHG | BODY MASS INDEX: 22.36 KG/M2 | RESPIRATION RATE: 18 BRPM | WEIGHT: 126.2 LBS | TEMPERATURE: 97.7 F | DIASTOLIC BLOOD PRESSURE: 84 MMHG

## 2020-01-01 VITALS
OXYGEN SATURATION: 95 % | HEART RATE: 87 BPM | TEMPERATURE: 97.8 F | HEIGHT: 63 IN | SYSTOLIC BLOOD PRESSURE: 156 MMHG | DIASTOLIC BLOOD PRESSURE: 74 MMHG | WEIGHT: 122 LBS | BODY MASS INDEX: 21.62 KG/M2

## 2020-01-01 VITALS
WEIGHT: 109 LBS | DIASTOLIC BLOOD PRESSURE: 73 MMHG | HEART RATE: 78 BPM | OXYGEN SATURATION: 100 % | HEIGHT: 63 IN | RESPIRATION RATE: 16 BRPM | SYSTOLIC BLOOD PRESSURE: 133 MMHG | BODY MASS INDEX: 19.31 KG/M2 | TEMPERATURE: 97.4 F

## 2020-01-01 VITALS
HEART RATE: 75 BPM | TEMPERATURE: 97.8 F | DIASTOLIC BLOOD PRESSURE: 74 MMHG | WEIGHT: 109.9 LBS | RESPIRATION RATE: 18 BRPM | HEIGHT: 63 IN | BODY MASS INDEX: 19.47 KG/M2 | SYSTOLIC BLOOD PRESSURE: 135 MMHG

## 2020-01-01 VITALS
SYSTOLIC BLOOD PRESSURE: 130 MMHG | HEART RATE: 85 BPM | HEIGHT: 63 IN | RESPIRATION RATE: 18 BRPM | WEIGHT: 108.3 LBS | TEMPERATURE: 97.7 F | BODY MASS INDEX: 19.19 KG/M2 | DIASTOLIC BLOOD PRESSURE: 76 MMHG

## 2020-01-01 VITALS
DIASTOLIC BLOOD PRESSURE: 90 MMHG | WEIGHT: 121.6 LBS | HEIGHT: 63 IN | SYSTOLIC BLOOD PRESSURE: 168 MMHG | HEART RATE: 80 BPM | BODY MASS INDEX: 21.55 KG/M2 | TEMPERATURE: 97.7 F | OXYGEN SATURATION: 96 %

## 2020-01-01 VITALS
TEMPERATURE: 98.1 F | WEIGHT: 110 LBS | HEART RATE: 74 BPM | OXYGEN SATURATION: 97 % | BODY MASS INDEX: 19.49 KG/M2 | DIASTOLIC BLOOD PRESSURE: 70 MMHG | RESPIRATION RATE: 18 BRPM | HEIGHT: 63 IN | SYSTOLIC BLOOD PRESSURE: 131 MMHG

## 2020-01-01 VITALS
WEIGHT: 108.6 LBS | HEIGHT: 63 IN | HEART RATE: 84 BPM | TEMPERATURE: 97.8 F | BODY MASS INDEX: 19.24 KG/M2 | SYSTOLIC BLOOD PRESSURE: 118 MMHG | DIASTOLIC BLOOD PRESSURE: 64 MMHG | RESPIRATION RATE: 16 BRPM

## 2020-01-01 VITALS
HEART RATE: 67 BPM | TEMPERATURE: 96.8 F | DIASTOLIC BLOOD PRESSURE: 73 MMHG | RESPIRATION RATE: 16 BRPM | OXYGEN SATURATION: 98 % | HEIGHT: 63 IN | WEIGHT: 110.5 LBS | BODY MASS INDEX: 19.58 KG/M2 | SYSTOLIC BLOOD PRESSURE: 139 MMHG

## 2020-01-01 VITALS
OXYGEN SATURATION: 96 % | HEIGHT: 63 IN | TEMPERATURE: 98 F | DIASTOLIC BLOOD PRESSURE: 56 MMHG | HEART RATE: 80 BPM | RESPIRATION RATE: 16 BRPM | WEIGHT: 119 LBS | BODY MASS INDEX: 21.09 KG/M2 | SYSTOLIC BLOOD PRESSURE: 113 MMHG

## 2020-01-01 VITALS
TEMPERATURE: 97 F | OXYGEN SATURATION: 100 % | HEIGHT: 63 IN | BODY MASS INDEX: 19.74 KG/M2 | DIASTOLIC BLOOD PRESSURE: 80 MMHG | WEIGHT: 111.4 LBS | SYSTOLIC BLOOD PRESSURE: 136 MMHG | HEART RATE: 72 BPM | RESPIRATION RATE: 18 BRPM

## 2020-01-01 VITALS
OXYGEN SATURATION: 99 % | TEMPERATURE: 97.7 F | HEART RATE: 69 BPM | RESPIRATION RATE: 18 BRPM | WEIGHT: 111.5 LBS | DIASTOLIC BLOOD PRESSURE: 77 MMHG | SYSTOLIC BLOOD PRESSURE: 136 MMHG | HEIGHT: 63 IN | BODY MASS INDEX: 19.76 KG/M2

## 2020-01-01 VITALS
HEIGHT: 63 IN | OXYGEN SATURATION: 100 % | RESPIRATION RATE: 18 BRPM | WEIGHT: 110.4 LBS | HEART RATE: 72 BPM | TEMPERATURE: 97.8 F | BODY MASS INDEX: 19.56 KG/M2 | SYSTOLIC BLOOD PRESSURE: 133 MMHG | DIASTOLIC BLOOD PRESSURE: 75 MMHG

## 2020-01-01 VITALS
WEIGHT: 110.1 LBS | HEIGHT: 63 IN | TEMPERATURE: 98.3 F | HEART RATE: 70 BPM | BODY MASS INDEX: 19.51 KG/M2 | SYSTOLIC BLOOD PRESSURE: 145 MMHG | DIASTOLIC BLOOD PRESSURE: 81 MMHG | RESPIRATION RATE: 16 BRPM

## 2020-01-01 VITALS
WEIGHT: 109.5 LBS | TEMPERATURE: 97.4 F | SYSTOLIC BLOOD PRESSURE: 151 MMHG | HEART RATE: 77 BPM | BODY MASS INDEX: 19.4 KG/M2 | HEIGHT: 63 IN | OXYGEN SATURATION: 100 % | RESPIRATION RATE: 16 BRPM | OXYGEN SATURATION: 100 % | DIASTOLIC BLOOD PRESSURE: 61 MMHG | TEMPERATURE: 97.7 F | BODY MASS INDEX: 19.83 KG/M2 | SYSTOLIC BLOOD PRESSURE: 138 MMHG | WEIGHT: 111.9 LBS | HEART RATE: 74 BPM | DIASTOLIC BLOOD PRESSURE: 79 MMHG | HEIGHT: 63 IN | RESPIRATION RATE: 18 BRPM

## 2020-01-01 VITALS
TEMPERATURE: 97.7 F | RESPIRATION RATE: 18 BRPM | DIASTOLIC BLOOD PRESSURE: 70 MMHG | SYSTOLIC BLOOD PRESSURE: 169 MMHG | BODY MASS INDEX: 19.67 KG/M2 | HEART RATE: 75 BPM | WEIGHT: 111 LBS | HEIGHT: 63 IN | OXYGEN SATURATION: 100 %

## 2020-01-01 VITALS
DIASTOLIC BLOOD PRESSURE: 83 MMHG | TEMPERATURE: 97.4 F | SYSTOLIC BLOOD PRESSURE: 162 MMHG | WEIGHT: 124.3 LBS | BODY MASS INDEX: 22.02 KG/M2 | HEART RATE: 81 BPM | HEIGHT: 63 IN | OXYGEN SATURATION: 93 %

## 2020-01-01 VITALS
HEIGHT: 63 IN | SYSTOLIC BLOOD PRESSURE: 157 MMHG | WEIGHT: 126 LBS | DIASTOLIC BLOOD PRESSURE: 87 MMHG | BODY MASS INDEX: 22.32 KG/M2 | RESPIRATION RATE: 18 BRPM | HEART RATE: 77 BPM | TEMPERATURE: 97.7 F

## 2020-01-01 DIAGNOSIS — C34.90 SMALL CELL CARCINOMA OF LUNG METASTATIC TO LIVER (HCC): ICD-10-CM

## 2020-01-01 DIAGNOSIS — C79.51 BONE METASTASES (HCC): ICD-10-CM

## 2020-01-01 DIAGNOSIS — C34.90 SMALL CELL CARCINOMA OF LUNG METASTATIC TO LIVER (HCC): Primary | ICD-10-CM

## 2020-01-01 DIAGNOSIS — D64.81 ANEMIA ASSOCIATED WITH CHEMOTHERAPY: ICD-10-CM

## 2020-01-01 DIAGNOSIS — C34.90 SMALL CELL LUNG CANCER (HCC): Primary | ICD-10-CM

## 2020-01-01 DIAGNOSIS — R60.0 PEDAL EDEMA: ICD-10-CM

## 2020-01-01 DIAGNOSIS — C34.90 SMALL CELL LUNG CANCER (HCC): ICD-10-CM

## 2020-01-01 DIAGNOSIS — T45.1X5A ANEMIA ASSOCIATED WITH CHEMOTHERAPY: ICD-10-CM

## 2020-01-01 DIAGNOSIS — C79.9 METASTATIC CANCER (HCC): ICD-10-CM

## 2020-01-01 DIAGNOSIS — C78.7 SMALL CELL CARCINOMA OF LUNG METASTATIC TO LIVER (HCC): Primary | ICD-10-CM

## 2020-01-01 DIAGNOSIS — R91.8 ABNORMAL CT SCAN OF LUNG: ICD-10-CM

## 2020-01-01 DIAGNOSIS — R91.8 LUNG NODULES: ICD-10-CM

## 2020-01-01 DIAGNOSIS — Z09 CHEMOTHERAPY FOLLOW-UP EXAMINATION: ICD-10-CM

## 2020-01-01 DIAGNOSIS — E43 SEVERE PROTEIN-CALORIE MALNUTRITION (HCC): ICD-10-CM

## 2020-01-01 DIAGNOSIS — C34.90 NON-SMALL CELL LUNG CANCER METASTATIC TO LIVER (HCC): Primary | ICD-10-CM

## 2020-01-01 DIAGNOSIS — C78.7 SMALL CELL CARCINOMA OF LUNG METASTATIC TO LIVER (HCC): ICD-10-CM

## 2020-01-01 DIAGNOSIS — R16.0 LIVER MASS: ICD-10-CM

## 2020-01-01 DIAGNOSIS — C78.7 NON-SMALL CELL LUNG CANCER METASTATIC TO LIVER (HCC): Primary | ICD-10-CM

## 2020-01-01 LAB
ALBUMIN SERPL-MCNC: 2.4 G/DL (ref 3.5–5)
ALBUMIN SERPL-MCNC: 2.5 G/DL (ref 3.5–5)
ALBUMIN SERPL-MCNC: 3.1 G/DL (ref 3.5–5)
ALBUMIN SERPL-MCNC: 3.2 G/DL (ref 3.5–5)
ALBUMIN SERPL-MCNC: 3.3 G/DL (ref 3.5–5)
ALBUMIN SERPL-MCNC: 3.5 G/DL (ref 3.5–5)
ALBUMIN SERPL-MCNC: 3.6 G/DL (ref 3.5–5)
ALBUMIN SERPL-MCNC: 3.7 G/DL (ref 3.5–5)
ALBUMIN SERPL-MCNC: 3.7 G/DL (ref 3.5–5)
ALBUMIN/GLOB SERPL: 0.6 {RATIO} (ref 1.1–2.2)
ALBUMIN/GLOB SERPL: 0.7 {RATIO} (ref 1.1–2.2)
ALBUMIN/GLOB SERPL: 0.9 {RATIO} (ref 1.1–2.2)
ALBUMIN/GLOB SERPL: 0.9 {RATIO} (ref 1.1–2.2)
ALBUMIN/GLOB SERPL: 1.1 {RATIO} (ref 1.1–2.2)
ALBUMIN/GLOB SERPL: 1.1 {RATIO} (ref 1.1–2.2)
ALBUMIN/GLOB SERPL: 1.2 {RATIO} (ref 1.1–2.2)
ALP SERPL-CCNC: 114 U/L (ref 45–117)
ALP SERPL-CCNC: 115 U/L (ref 45–117)
ALP SERPL-CCNC: 124 U/L (ref 45–117)
ALP SERPL-CCNC: 163 U/L (ref 45–117)
ALP SERPL-CCNC: 320 U/L (ref 45–117)
ALP SERPL-CCNC: 436 U/L (ref 45–117)
ALP SERPL-CCNC: 504 U/L (ref 45–117)
ALP SERPL-CCNC: 78 U/L (ref 45–117)
ALP SERPL-CCNC: 85 U/L (ref 45–117)
ALT SERPL-CCNC: 118 U/L (ref 12–78)
ALT SERPL-CCNC: 16 U/L (ref 12–78)
ALT SERPL-CCNC: 18 U/L (ref 12–78)
ALT SERPL-CCNC: 20 U/L (ref 12–78)
ALT SERPL-CCNC: 28 U/L (ref 12–78)
ANION GAP SERPL CALC-SCNC: 3 MMOL/L (ref 5–15)
ANION GAP SERPL CALC-SCNC: 3 MMOL/L (ref 5–15)
ANION GAP SERPL CALC-SCNC: 4 MMOL/L (ref 5–15)
ANION GAP SERPL CALC-SCNC: 5 MMOL/L (ref 5–15)
ANION GAP SERPL CALC-SCNC: 5 MMOL/L (ref 5–15)
ANION GAP SERPL CALC-SCNC: 6 MMOL/L (ref 5–15)
ANION GAP SERPL CALC-SCNC: 6 MMOL/L (ref 5–15)
ANION GAP SERPL CALC-SCNC: 7 MMOL/L (ref 5–15)
ANION GAP SERPL CALC-SCNC: 8 MMOL/L (ref 5–15)
APPEARANCE UR: CLEAR
AST SERPL-CCNC: 17 U/L (ref 15–37)
AST SERPL-CCNC: 170 U/L (ref 15–37)
AST SERPL-CCNC: 18 U/L (ref 15–37)
AST SERPL-CCNC: 19 U/L (ref 15–37)
AST SERPL-CCNC: 19 U/L (ref 15–37)
AST SERPL-CCNC: 22 U/L (ref 15–37)
AST SERPL-CCNC: 25 U/L (ref 15–37)
BACTERIA SPEC CULT: ABNORMAL
BACTERIA URNS QL MICRO: NEGATIVE /HPF
BASOPHILS # BLD: 0 K/UL (ref 0–0.1)
BASOPHILS # BLD: 0.1 K/UL (ref 0–0.1)
BASOPHILS # BLD: 0.1 K/UL (ref 0–0.1)
BASOPHILS NFR BLD: 0 % (ref 0–1)
BASOPHILS NFR BLD: 0 % (ref 0–1)
BASOPHILS NFR BLD: 1 % (ref 0–1)
BILIRUB SERPL-MCNC: 0.4 MG/DL (ref 0.2–1)
BILIRUB SERPL-MCNC: 0.5 MG/DL (ref 0.2–1)
BILIRUB SERPL-MCNC: 0.6 MG/DL (ref 0.2–1)
BILIRUB SERPL-MCNC: 0.7 MG/DL (ref 0.2–1)
BILIRUB SERPL-MCNC: 0.7 MG/DL (ref 0.2–1)
BILIRUB SERPL-MCNC: 0.8 MG/DL (ref 0.2–1)
BILIRUB SERPL-MCNC: 2.9 MG/DL (ref 0.2–1)
BILIRUB UR QL: NEGATIVE
BUN SERPL-MCNC: 10 MG/DL (ref 6–20)
BUN SERPL-MCNC: 13 MG/DL (ref 6–20)
BUN SERPL-MCNC: 14 MG/DL (ref 6–20)
BUN SERPL-MCNC: 16 MG/DL (ref 6–20)
BUN SERPL-MCNC: 17 MG/DL (ref 6–20)
BUN SERPL-MCNC: 18 MG/DL (ref 6–20)
BUN SERPL-MCNC: 21 MG/DL (ref 6–20)
BUN SERPL-MCNC: 25 MG/DL (ref 6–20)
BUN SERPL-MCNC: 9 MG/DL (ref 6–20)
BUN/CREAT SERPL: 13 (ref 12–20)
BUN/CREAT SERPL: 18 (ref 12–20)
BUN/CREAT SERPL: 20 (ref 12–20)
BUN/CREAT SERPL: 23 (ref 12–20)
BUN/CREAT SERPL: 23 (ref 12–20)
BUN/CREAT SERPL: 24 (ref 12–20)
BUN/CREAT SERPL: 26 (ref 12–20)
BUN/CREAT SERPL: 29 (ref 12–20)
BUN/CREAT SERPL: 39 (ref 12–20)
CALCIUM SERPL-MCNC: 7.4 MG/DL (ref 8.5–10.1)
CALCIUM SERPL-MCNC: 7.9 MG/DL (ref 8.5–10.1)
CALCIUM SERPL-MCNC: 8.2 MG/DL (ref 8.5–10.1)
CALCIUM SERPL-MCNC: 8.3 MG/DL (ref 8.5–10.1)
CALCIUM SERPL-MCNC: 8.4 MG/DL (ref 8.5–10.1)
CALCIUM SERPL-MCNC: 8.9 MG/DL (ref 8.5–10.1)
CALCIUM SERPL-MCNC: 9.1 MG/DL (ref 8.5–10.1)
CALCIUM SERPL-MCNC: 9.1 MG/DL (ref 8.5–10.1)
CALCIUM SERPL-MCNC: 9.5 MG/DL (ref 8.5–10.1)
CC UR VC: ABNORMAL
CHLORIDE SERPL-SCNC: 106 MMOL/L (ref 97–108)
CHLORIDE SERPL-SCNC: 108 MMOL/L (ref 97–108)
CHLORIDE SERPL-SCNC: 108 MMOL/L (ref 97–108)
CHLORIDE SERPL-SCNC: 109 MMOL/L (ref 97–108)
CHLORIDE SERPL-SCNC: 110 MMOL/L (ref 97–108)
CHLORIDE SERPL-SCNC: 110 MMOL/L (ref 97–108)
CHLORIDE SERPL-SCNC: 112 MMOL/L (ref 97–108)
CO2 SERPL-SCNC: 25 MMOL/L (ref 21–32)
CO2 SERPL-SCNC: 26 MMOL/L (ref 21–32)
CO2 SERPL-SCNC: 29 MMOL/L (ref 21–32)
CO2 SERPL-SCNC: 29 MMOL/L (ref 21–32)
COLOR UR: ABNORMAL
CREAT SERPL-MCNC: 0.5 MG/DL (ref 0.55–1.02)
CREAT SERPL-MCNC: 0.57 MG/DL (ref 0.55–1.02)
CREAT SERPL-MCNC: 0.58 MG/DL (ref 0.55–1.02)
CREAT SERPL-MCNC: 0.61 MG/DL (ref 0.55–1.02)
CREAT SERPL-MCNC: 0.64 MG/DL (ref 0.55–1.02)
CREAT SERPL-MCNC: 0.71 MG/DL (ref 0.55–1.02)
CREAT SERPL-MCNC: 0.76 MG/DL (ref 0.55–1.02)
CREAT SERPL-MCNC: 0.79 MG/DL (ref 0.55–1.02)
CREAT SERPL-MCNC: 0.93 MG/DL (ref 0.55–1.02)
DIFFERENTIAL METHOD BLD: ABNORMAL
EOSINOPHIL # BLD: 0 K/UL (ref 0–0.4)
EOSINOPHIL # BLD: 0.1 K/UL (ref 0–0.4)
EOSINOPHIL # BLD: 0.1 K/UL (ref 0–0.4)
EOSINOPHIL NFR BLD: 0 % (ref 0–7)
EOSINOPHIL NFR BLD: 2 % (ref 0–7)
EOSINOPHIL NFR BLD: 2 % (ref 0–7)
EPITH CASTS URNS QL MICRO: ABNORMAL /LPF
ERYTHROCYTE [DISTWIDTH] IN BLOOD BY AUTOMATED COUNT: 12.7 % (ref 11.5–14.5)
ERYTHROCYTE [DISTWIDTH] IN BLOOD BY AUTOMATED COUNT: 13.4 % (ref 11.5–14.5)
ERYTHROCYTE [DISTWIDTH] IN BLOOD BY AUTOMATED COUNT: 15 % (ref 11.5–14.5)
ERYTHROCYTE [DISTWIDTH] IN BLOOD BY AUTOMATED COUNT: 15.7 % (ref 11.5–14.5)
ERYTHROCYTE [DISTWIDTH] IN BLOOD BY AUTOMATED COUNT: 16.6 % (ref 11.5–14.5)
ERYTHROCYTE [DISTWIDTH] IN BLOOD BY AUTOMATED COUNT: 16.6 % (ref 11.5–14.5)
ERYTHROCYTE [DISTWIDTH] IN BLOOD BY AUTOMATED COUNT: 19.9 % (ref 11.5–14.5)
ERYTHROCYTE [DISTWIDTH] IN BLOOD BY AUTOMATED COUNT: 21.3 % (ref 11.5–14.5)
ERYTHROCYTE [DISTWIDTH] IN BLOOD BY AUTOMATED COUNT: 21.8 % (ref 11.5–14.5)
GLOBULIN SER CALC-MCNC: 2.9 G/DL (ref 2–4)
GLOBULIN SER CALC-MCNC: 3 G/DL (ref 2–4)
GLOBULIN SER CALC-MCNC: 3.1 G/DL (ref 2–4)
GLOBULIN SER CALC-MCNC: 3.2 G/DL (ref 2–4)
GLOBULIN SER CALC-MCNC: 3.2 G/DL (ref 2–4)
GLOBULIN SER CALC-MCNC: 3.4 G/DL (ref 2–4)
GLOBULIN SER CALC-MCNC: 3.4 G/DL (ref 2–4)
GLOBULIN SER CALC-MCNC: 3.6 G/DL (ref 2–4)
GLOBULIN SER CALC-MCNC: 3.8 G/DL (ref 2–4)
GLUCOSE SERPL-MCNC: 79 MG/DL (ref 65–100)
GLUCOSE SERPL-MCNC: 90 MG/DL (ref 65–100)
GLUCOSE SERPL-MCNC: 91 MG/DL (ref 65–100)
GLUCOSE SERPL-MCNC: 92 MG/DL (ref 65–100)
GLUCOSE SERPL-MCNC: 95 MG/DL (ref 65–100)
GLUCOSE SERPL-MCNC: 95 MG/DL (ref 65–100)
GLUCOSE SERPL-MCNC: 96 MG/DL (ref 65–100)
GLUCOSE UR STRIP.AUTO-MCNC: NEGATIVE MG/DL
HCT VFR BLD AUTO: 22.9 % (ref 35–47)
HCT VFR BLD AUTO: 28.4 % (ref 35–47)
HCT VFR BLD AUTO: 30.6 % (ref 35–47)
HCT VFR BLD AUTO: 31 % (ref 35–47)
HCT VFR BLD AUTO: 33.9 % (ref 35–47)
HCT VFR BLD AUTO: 34.6 % (ref 35–47)
HCT VFR BLD AUTO: 37.8 % (ref 35–47)
HCT VFR BLD AUTO: 38.2 % (ref 35–47)
HCT VFR BLD AUTO: 40.2 % (ref 35–47)
HGB BLD-MCNC: 10.8 G/DL (ref 11.5–16)
HGB BLD-MCNC: 11.1 G/DL (ref 11.5–16)
HGB BLD-MCNC: 12.1 G/DL (ref 11.5–16)
HGB BLD-MCNC: 12.5 G/DL (ref 11.5–16)
HGB BLD-MCNC: 13.7 G/DL (ref 11.5–16)
HGB BLD-MCNC: 8.4 G/DL (ref 11.5–16)
HGB BLD-MCNC: 8.8 G/DL (ref 11.5–16)
HGB BLD-MCNC: 9.7 G/DL (ref 11.5–16)
HGB BLD-MCNC: 9.7 G/DL (ref 11.5–16)
HGB UR QL STRIP: ABNORMAL
HYALINE CASTS URNS QL MICRO: ABNORMAL /LPF (ref 0–5)
IMM GRANULOCYTES # BLD AUTO: 0 K/UL (ref 0–0.04)
IMM GRANULOCYTES # BLD AUTO: 0.1 K/UL (ref 0–0.04)
IMM GRANULOCYTES # BLD AUTO: 0.1 K/UL (ref 0–0.04)
IMM GRANULOCYTES NFR BLD AUTO: 0 % (ref 0–0.5)
IMM GRANULOCYTES NFR BLD AUTO: 1 % (ref 0–0.5)
IMM GRANULOCYTES NFR BLD AUTO: 3 % (ref 0–0.5)
KETONES UR QL STRIP.AUTO: NEGATIVE MG/DL
LEUKOCYTE ESTERASE UR QL STRIP.AUTO: ABNORMAL
LYMPHOCYTES # BLD: 0.5 K/UL (ref 0.8–3.5)
LYMPHOCYTES # BLD: 0.5 K/UL (ref 0.8–3.5)
LYMPHOCYTES # BLD: 0.6 K/UL (ref 0.8–3.5)
LYMPHOCYTES # BLD: 0.7 K/UL (ref 0.8–3.5)
LYMPHOCYTES # BLD: 0.8 K/UL (ref 0.8–3.5)
LYMPHOCYTES NFR BLD: 10 % (ref 12–49)
LYMPHOCYTES NFR BLD: 11 % (ref 12–49)
LYMPHOCYTES NFR BLD: 13 % (ref 12–49)
LYMPHOCYTES NFR BLD: 13 % (ref 12–49)
LYMPHOCYTES NFR BLD: 15 % (ref 12–49)
LYMPHOCYTES NFR BLD: 17 % (ref 12–49)
LYMPHOCYTES NFR BLD: 17 % (ref 12–49)
LYMPHOCYTES NFR BLD: 18 % (ref 12–49)
LYMPHOCYTES NFR BLD: 4 % (ref 12–49)
MCH RBC QN AUTO: 31.2 PG (ref 26–34)
MCH RBC QN AUTO: 31.2 PG (ref 26–34)
MCH RBC QN AUTO: 31.5 PG (ref 26–34)
MCH RBC QN AUTO: 32 PG (ref 26–34)
MCH RBC QN AUTO: 33 PG (ref 26–34)
MCH RBC QN AUTO: 33.1 PG (ref 26–34)
MCH RBC QN AUTO: 33.4 PG (ref 26–34)
MCH RBC QN AUTO: 33.6 PG (ref 26–34)
MCH RBC QN AUTO: 38.2 PG (ref 26–34)
MCHC RBC AUTO-ENTMCNC: 31 G/DL (ref 30–36.5)
MCHC RBC AUTO-ENTMCNC: 31.3 G/DL (ref 30–36.5)
MCHC RBC AUTO-ENTMCNC: 31.7 G/DL (ref 30–36.5)
MCHC RBC AUTO-ENTMCNC: 31.9 G/DL (ref 30–36.5)
MCHC RBC AUTO-ENTMCNC: 32 G/DL (ref 30–36.5)
MCHC RBC AUTO-ENTMCNC: 32.1 G/DL (ref 30–36.5)
MCHC RBC AUTO-ENTMCNC: 32.7 G/DL (ref 30–36.5)
MCHC RBC AUTO-ENTMCNC: 34.1 G/DL (ref 30–36.5)
MCHC RBC AUTO-ENTMCNC: 36.7 G/DL (ref 30–36.5)
MCV RBC AUTO: 103 FL (ref 80–99)
MCV RBC AUTO: 103.3 FL (ref 80–99)
MCV RBC AUTO: 104.1 FL (ref 80–99)
MCV RBC AUTO: 105 FL (ref 80–99)
MCV RBC AUTO: 105.8 FL (ref 80–99)
MCV RBC AUTO: 105.9 FL (ref 80–99)
MCV RBC AUTO: 91.6 FL (ref 80–99)
MCV RBC AUTO: 95.3 FL (ref 80–99)
MCV RBC AUTO: 98.4 FL (ref 80–99)
MONOCYTES # BLD: 0.4 K/UL (ref 0–1)
MONOCYTES # BLD: 0.5 K/UL (ref 0–1)
MONOCYTES # BLD: 0.6 K/UL (ref 0–1)
MONOCYTES # BLD: 0.7 K/UL (ref 0–1)
MONOCYTES NFR BLD: 10 % (ref 5–13)
MONOCYTES NFR BLD: 10 % (ref 5–13)
MONOCYTES NFR BLD: 11 % (ref 5–13)
MONOCYTES NFR BLD: 13 % (ref 5–13)
MONOCYTES NFR BLD: 3 % (ref 5–13)
NEUTS BAND NFR BLD MANUAL: 1 %
NEUTS SEG # BLD: 13.5 K/UL (ref 1.8–8)
NEUTS SEG # BLD: 2.6 K/UL (ref 1.8–8)
NEUTS SEG # BLD: 2.6 K/UL (ref 1.8–8)
NEUTS SEG # BLD: 3 K/UL (ref 1.8–8)
NEUTS SEG # BLD: 3.3 K/UL (ref 1.8–8)
NEUTS SEG # BLD: 3.5 K/UL (ref 1.8–8)
NEUTS SEG # BLD: 3.5 K/UL (ref 1.8–8)
NEUTS SEG # BLD: 3.6 K/UL (ref 1.8–8)
NEUTS SEG # BLD: 4.1 K/UL (ref 1.8–8)
NEUTS SEG NFR BLD: 67 % (ref 32–75)
NEUTS SEG NFR BLD: 69 % (ref 32–75)
NEUTS SEG NFR BLD: 70 % (ref 32–75)
NEUTS SEG NFR BLD: 70 % (ref 32–75)
NEUTS SEG NFR BLD: 73 % (ref 32–75)
NEUTS SEG NFR BLD: 74 % (ref 32–75)
NEUTS SEG NFR BLD: 75 % (ref 32–75)
NEUTS SEG NFR BLD: 77 % (ref 32–75)
NEUTS SEG NFR BLD: 92 % (ref 32–75)
NITRITE UR QL STRIP.AUTO: NEGATIVE
NRBC # BLD: 0 K/UL (ref 0–0.01)
NRBC # BLD: 0.02 K/UL (ref 0–0.01)
NRBC # BLD: 0.03 K/UL (ref 0–0.01)
NRBC BLD-RTO: 0 PER 100 WBC
NRBC BLD-RTO: 0.2 PER 100 WBC
NRBC BLD-RTO: 0.5 PER 100 WBC
PH UR STRIP: 5 [PH] (ref 5–8)
PLATELET # BLD AUTO: 121 K/UL (ref 150–400)
PLATELET # BLD AUTO: 124 K/UL (ref 150–400)
PLATELET # BLD AUTO: 131 K/UL (ref 150–400)
PLATELET # BLD AUTO: 188 K/UL (ref 150–400)
PLATELET # BLD AUTO: 192 K/UL (ref 150–400)
PLATELET # BLD AUTO: 203 K/UL (ref 150–400)
PLATELET # BLD AUTO: 212 K/UL (ref 150–400)
PLATELET # BLD AUTO: 275 K/UL (ref 150–400)
PLATELET # BLD AUTO: 289 K/UL (ref 150–400)
PMV BLD AUTO: 10 FL (ref 8.9–12.9)
PMV BLD AUTO: 10.1 FL (ref 8.9–12.9)
PMV BLD AUTO: 10.4 FL (ref 8.9–12.9)
PMV BLD AUTO: 10.7 FL (ref 8.9–12.9)
PMV BLD AUTO: 9.5 FL (ref 8.9–12.9)
PMV BLD AUTO: 9.6 FL (ref 8.9–12.9)
PMV BLD AUTO: 9.7 FL (ref 8.9–12.9)
PMV BLD AUTO: 9.7 FL (ref 8.9–12.9)
PMV BLD AUTO: 9.8 FL (ref 8.9–12.9)
POTASSIUM SERPL-SCNC: 3.5 MMOL/L (ref 3.5–5.1)
POTASSIUM SERPL-SCNC: 3.6 MMOL/L (ref 3.5–5.1)
POTASSIUM SERPL-SCNC: 3.6 MMOL/L (ref 3.5–5.1)
POTASSIUM SERPL-SCNC: 3.7 MMOL/L (ref 3.5–5.1)
POTASSIUM SERPL-SCNC: 3.8 MMOL/L (ref 3.5–5.1)
POTASSIUM SERPL-SCNC: 3.8 MMOL/L (ref 3.5–5.1)
POTASSIUM SERPL-SCNC: 3.9 MMOL/L (ref 3.5–5.1)
POTASSIUM SERPL-SCNC: 4 MMOL/L (ref 3.5–5.1)
POTASSIUM SERPL-SCNC: 4.1 MMOL/L (ref 3.5–5.1)
PROT SERPL-MCNC: 6.1 G/DL (ref 6.4–8.2)
PROT SERPL-MCNC: 6.2 G/DL (ref 6.4–8.2)
PROT SERPL-MCNC: 6.4 G/DL (ref 6.4–8.2)
PROT SERPL-MCNC: 6.5 G/DL (ref 6.4–8.2)
PROT SERPL-MCNC: 6.5 G/DL (ref 6.4–8.2)
PROT SERPL-MCNC: 6.6 G/DL (ref 6.4–8.2)
PROT SERPL-MCNC: 6.7 G/DL (ref 6.4–8.2)
PROT SERPL-MCNC: 6.7 G/DL (ref 6.4–8.2)
PROT SERPL-MCNC: 6.9 G/DL (ref 6.4–8.2)
PROT UR STRIP-MCNC: NEGATIVE MG/DL
RBC # BLD AUTO: 2.2 M/UL (ref 3.8–5.2)
RBC # BLD AUTO: 2.75 M/UL (ref 3.8–5.2)
RBC # BLD AUTO: 2.89 M/UL (ref 3.8–5.2)
RBC # BLD AUTO: 2.93 M/UL (ref 3.8–5.2)
RBC # BLD AUTO: 3.23 M/UL (ref 3.8–5.2)
RBC # BLD AUTO: 3.36 M/UL (ref 3.8–5.2)
RBC # BLD AUTO: 3.84 M/UL (ref 3.8–5.2)
RBC # BLD AUTO: 4.01 M/UL (ref 3.8–5.2)
RBC # BLD AUTO: 4.39 M/UL (ref 3.8–5.2)
RBC #/AREA URNS HPF: ABNORMAL /HPF (ref 0–5)
RBC MORPH BLD: ABNORMAL
SERVICE CMNT-IMP: ABNORMAL
SODIUM SERPL-SCNC: 137 MMOL/L (ref 136–145)
SODIUM SERPL-SCNC: 138 MMOL/L (ref 136–145)
SODIUM SERPL-SCNC: 139 MMOL/L (ref 136–145)
SODIUM SERPL-SCNC: 140 MMOL/L (ref 136–145)
SODIUM SERPL-SCNC: 141 MMOL/L (ref 136–145)
SODIUM SERPL-SCNC: 142 MMOL/L (ref 136–145)
SODIUM SERPL-SCNC: 142 MMOL/L (ref 136–145)
SP GR UR REFRACTOMETRY: 1.01 (ref 1–1.03)
TSH SERPL DL<=0.05 MIU/L-ACNC: 1.13 UIU/ML (ref 0.36–3.74)
TSH SERPL DL<=0.05 MIU/L-ACNC: 1.55 UIU/ML (ref 0.36–3.74)
TSH SERPL DL<=0.05 MIU/L-ACNC: 1.58 UIU/ML (ref 0.36–3.74)
TSH SERPL DL<=0.05 MIU/L-ACNC: 1.72 UIU/ML (ref 0.36–3.74)
TSH SERPL DL<=0.05 MIU/L-ACNC: 2.21 UIU/ML (ref 0.36–3.74)
UA: UC IF INDICATED,UAUC: ABNORMAL
UROBILINOGEN UR QL STRIP.AUTO: 0.2 EU/DL (ref 0.2–1)
WBC # BLD AUTO: 14.5 K/UL (ref 3.6–11)
WBC # BLD AUTO: 3.7 K/UL (ref 3.6–11)
WBC # BLD AUTO: 3.8 K/UL (ref 3.6–11)
WBC # BLD AUTO: 4.1 K/UL (ref 3.6–11)
WBC # BLD AUTO: 4.3 K/UL (ref 3.6–11)
WBC # BLD AUTO: 4.5 K/UL (ref 3.6–11)
WBC # BLD AUTO: 4.9 K/UL (ref 3.6–11)
WBC # BLD AUTO: 5.1 K/UL (ref 3.6–11)
WBC # BLD AUTO: 5.6 K/UL (ref 3.6–11)
WBC URNS QL MICRO: ABNORMAL /HPF (ref 0–4)

## 2020-01-01 PROCEDURE — 77030012965 HC NDL HUBR BBMI -A

## 2020-01-01 PROCEDURE — G8536 NO DOC ELDER MAL SCRN: HCPCS | Performed by: INTERNAL MEDICINE

## 2020-01-01 PROCEDURE — 36415 COLL VENOUS BLD VENIPUNCTURE: CPT

## 2020-01-01 PROCEDURE — 96375 TX/PRO/DX INJ NEW DRUG ADDON: CPT

## 2020-01-01 PROCEDURE — 74011250636 HC RX REV CODE- 250/636: Performed by: INTERNAL MEDICINE

## 2020-01-01 PROCEDURE — 96413 CHEMO IV INFUSION 1 HR: CPT

## 2020-01-01 PROCEDURE — G9899 SCRN MAM PERF RSLTS DOC: HCPCS | Performed by: INTERNAL MEDICINE

## 2020-01-01 PROCEDURE — 96377 APPLICATON ON-BODY INJECTOR: CPT

## 2020-01-01 PROCEDURE — G8427 DOCREV CUR MEDS BY ELIG CLIN: HCPCS | Performed by: INTERNAL MEDICINE

## 2020-01-01 PROCEDURE — 74011000255 HC RX REV CODE- 255: Performed by: INTERNAL MEDICINE

## 2020-01-01 PROCEDURE — 96367 TX/PROPH/DG ADDL SEQ IV INF: CPT

## 2020-01-01 PROCEDURE — 85025 COMPLETE CBC W/AUTO DIFF WBC: CPT

## 2020-01-01 PROCEDURE — 99215 OFFICE O/P EST HI 40 MIN: CPT | Performed by: NURSE PRACTITIONER

## 2020-01-01 PROCEDURE — 1101F PT FALLS ASSESS-DOCD LE1/YR: CPT | Performed by: INTERNAL MEDICINE

## 2020-01-01 PROCEDURE — G8399 PT W/DXA RESULTS DOCUMENT: HCPCS | Performed by: INTERNAL MEDICINE

## 2020-01-01 PROCEDURE — 74011250636 HC RX REV CODE- 250/636: Performed by: NURSE PRACTITIONER

## 2020-01-01 PROCEDURE — 3017F COLORECTAL CA SCREEN DOC REV: CPT | Performed by: INTERNAL MEDICINE

## 2020-01-01 PROCEDURE — 81001 URINALYSIS AUTO W/SCOPE: CPT

## 2020-01-01 PROCEDURE — 77001 FLUOROGUIDE FOR VEIN DEVICE: CPT

## 2020-01-01 PROCEDURE — G8420 CALC BMI NORM PARAMETERS: HCPCS | Performed by: INTERNAL MEDICINE

## 2020-01-01 PROCEDURE — C1788 PORT, INDWELLING, IMP: HCPCS

## 2020-01-01 PROCEDURE — 96417 CHEMO IV INFUS EACH ADDL SEQ: CPT

## 2020-01-01 PROCEDURE — 1090F PRES/ABSN URINE INCON ASSESS: CPT | Performed by: INTERNAL MEDICINE

## 2020-01-01 PROCEDURE — 77030014006 HC SPNG HEMSTAT J&J -A

## 2020-01-01 PROCEDURE — G8510 SCR DEP NEG, NO PLAN REQD: HCPCS | Performed by: INTERNAL MEDICINE

## 2020-01-01 PROCEDURE — 74011000258 HC RX REV CODE- 258: Performed by: INTERNAL MEDICINE

## 2020-01-01 PROCEDURE — 80053 COMPREHEN METABOLIC PANEL: CPT

## 2020-01-01 PROCEDURE — 87186 SC STD MICRODIL/AGAR DIL: CPT

## 2020-01-01 PROCEDURE — 88341 IMHCHEM/IMCYTCHM EA ADD ANTB: CPT

## 2020-01-01 PROCEDURE — 96372 THER/PROPH/DIAG INJ SC/IM: CPT

## 2020-01-01 PROCEDURE — G0463 HOSPITAL OUTPT CLINIC VISIT: HCPCS | Performed by: NURSE PRACTITIONER

## 2020-01-01 PROCEDURE — 78306 BONE IMAGING WHOLE BODY: CPT

## 2020-01-01 PROCEDURE — 88360 TUMOR IMMUNOHISTOCHEM/MANUAL: CPT

## 2020-01-01 PROCEDURE — 88342 IMHCHEM/IMCYTCHM 1ST ANTB: CPT

## 2020-01-01 PROCEDURE — 87077 CULTURE AEROBIC IDENTIFY: CPT

## 2020-01-01 PROCEDURE — 77030031139 HC SUT VCRL2 J&J -A

## 2020-01-01 PROCEDURE — 74011000250 HC RX REV CODE- 250: Performed by: INTERNAL MEDICINE

## 2020-01-01 PROCEDURE — 84443 ASSAY THYROID STIM HORMONE: CPT

## 2020-01-01 PROCEDURE — 77030010507 HC ADH SKN DERMBND J&J -B

## 2020-01-01 PROCEDURE — 74011000636 HC RX REV CODE- 636: Performed by: INTERNAL MEDICINE

## 2020-01-01 PROCEDURE — 87086 URINE CULTURE/COLONY COUNT: CPT

## 2020-01-01 PROCEDURE — 71260 CT THORAX DX C+: CPT

## 2020-01-01 PROCEDURE — 99215 OFFICE O/P EST HI 40 MIN: CPT | Performed by: INTERNAL MEDICINE

## 2020-01-01 PROCEDURE — G8432 DEP SCR NOT DOC, RNG: HCPCS | Performed by: INTERNAL MEDICINE

## 2020-01-01 PROCEDURE — A9575 INJ GADOTERATE MEGLUMI 0.1ML: HCPCS | Performed by: INTERNAL MEDICINE

## 2020-01-01 PROCEDURE — C1892 INTRO/SHEATH,FIXED,PEEL-AWAY: HCPCS

## 2020-01-01 PROCEDURE — 70553 MRI BRAIN STEM W/O & W/DYE: CPT

## 2020-01-01 PROCEDURE — 74177 CT ABD & PELVIS W/CONTRAST: CPT

## 2020-01-01 PROCEDURE — 88307 TISSUE EXAM BY PATHOLOGIST: CPT

## 2020-01-01 PROCEDURE — 77030040395 HC NDL BIOP COAX INTRO MRTM -B

## 2020-01-01 PROCEDURE — 74011250636 HC RX REV CODE- 250/636: Performed by: STUDENT IN AN ORGANIZED HEALTH CARE EDUCATION/TRAINING PROGRAM

## 2020-01-01 PROCEDURE — 37200 TRANSCATHETER BIOPSY: CPT

## 2020-01-01 PROCEDURE — 74011000250 HC RX REV CODE- 250

## 2020-01-01 PROCEDURE — 77030003560 HC NDL HUBR BARD -A

## 2020-01-01 PROCEDURE — 74011000258 HC RX REV CODE- 258: Performed by: NURSE PRACTITIONER

## 2020-01-01 PROCEDURE — 74011000250 HC RX REV CODE- 250: Performed by: STUDENT IN AN ORGANIZED HEALTH CARE EDUCATION/TRAINING PROGRAM

## 2020-01-01 RX ORDER — DIPHENHYDRAMINE HYDROCHLORIDE 50 MG/ML
50 INJECTION, SOLUTION INTRAMUSCULAR; INTRAVENOUS AS NEEDED
Status: CANCELLED
Start: 2020-01-01

## 2020-01-01 RX ORDER — SODIUM CHLORIDE 0.9 % (FLUSH) 0.9 %
10 SYRINGE (ML) INJECTION AS NEEDED
Status: CANCELLED
Start: 2020-01-01

## 2020-01-01 RX ORDER — HEPARIN 100 UNIT/ML
300-500 SYRINGE INTRAVENOUS AS NEEDED
Status: ACTIVE | OUTPATIENT
Start: 2020-01-01 | End: 2020-01-01

## 2020-01-01 RX ORDER — SODIUM CHLORIDE 9 MG/ML
10 INJECTION INTRAMUSCULAR; INTRAVENOUS; SUBCUTANEOUS AS NEEDED
Status: CANCELLED | OUTPATIENT
Start: 2020-01-01

## 2020-01-01 RX ORDER — SODIUM CHLORIDE 9 MG/ML
25 INJECTION, SOLUTION INTRAVENOUS CONTINUOUS
Status: DISPENSED | OUTPATIENT
Start: 2020-01-01 | End: 2020-01-01

## 2020-01-01 RX ORDER — MIDAZOLAM HYDROCHLORIDE 5 MG/ML
1-5 INJECTION, SOLUTION INTRAMUSCULAR; INTRAVENOUS
Status: DISCONTINUED | OUTPATIENT
Start: 2020-01-01 | End: 2020-01-01

## 2020-01-01 RX ORDER — IBUPROFEN 400 MG/1
400 TABLET ORAL
Status: CANCELLED | OUTPATIENT
Start: 2020-01-01

## 2020-01-01 RX ORDER — EPINEPHRINE 1 MG/ML
0.3 INJECTION, SOLUTION, CONCENTRATE INTRAVENOUS AS NEEDED
Status: CANCELLED | OUTPATIENT
Start: 2020-01-01

## 2020-01-01 RX ORDER — ONDANSETRON 2 MG/ML
8 INJECTION INTRAMUSCULAR; INTRAVENOUS AS NEEDED
Status: CANCELLED | OUTPATIENT
Start: 2020-01-01

## 2020-01-01 RX ORDER — SODIUM CHLORIDE 9 MG/ML
25 INJECTION, SOLUTION INTRAVENOUS CONTINUOUS
Status: CANCELLED | OUTPATIENT
Start: 2020-01-01 | End: 2020-01-01

## 2020-01-01 RX ORDER — ACETAMINOPHEN 325 MG/1
650 TABLET ORAL AS NEEDED
Status: CANCELLED
Start: 2020-01-01

## 2020-01-01 RX ORDER — LORAZEPAM 2 MG/ML
0.5 INJECTION INTRAMUSCULAR
Status: CANCELLED | OUTPATIENT
Start: 2020-01-01

## 2020-01-01 RX ORDER — DEXAMETHASONE SODIUM PHOSPHATE 4 MG/ML
8 INJECTION, SOLUTION INTRA-ARTICULAR; INTRALESIONAL; INTRAMUSCULAR; INTRAVENOUS; SOFT TISSUE ONCE
Status: DISCONTINUED | OUTPATIENT
Start: 2020-01-01 | End: 2020-01-01

## 2020-01-01 RX ORDER — SODIUM CHLORIDE 0.9 % (FLUSH) 0.9 %
10 SYRINGE (ML) INJECTION AS NEEDED
Status: DISPENSED | OUTPATIENT
Start: 2020-01-01 | End: 2020-01-01

## 2020-01-01 RX ORDER — DIPHENHYDRAMINE HYDROCHLORIDE 50 MG/ML
50 INJECTION, SOLUTION INTRAMUSCULAR; INTRAVENOUS
Status: CANCELLED | OUTPATIENT
Start: 2020-01-01

## 2020-01-01 RX ORDER — DEXAMETHASONE SODIUM PHOSPHATE 4 MG/ML
8 INJECTION, SOLUTION INTRA-ARTICULAR; INTRALESIONAL; INTRAMUSCULAR; INTRAVENOUS; SOFT TISSUE ONCE
Status: COMPLETED | OUTPATIENT
Start: 2020-01-01 | End: 2020-01-01

## 2020-01-01 RX ORDER — DIPHENHYDRAMINE HYDROCHLORIDE 50 MG/ML
25 INJECTION, SOLUTION INTRAMUSCULAR; INTRAVENOUS AS NEEDED
Status: CANCELLED
Start: 2020-01-01

## 2020-01-01 RX ORDER — SODIUM CHLORIDE 0.9 % (FLUSH) 0.9 %
10 SYRINGE (ML) INJECTION AS NEEDED
Status: CANCELLED | OUTPATIENT
Start: 2020-01-01 | End: 2020-01-01

## 2020-01-01 RX ORDER — SODIUM CHLORIDE 9 MG/ML
10 INJECTION INTRAMUSCULAR; INTRAVENOUS; SUBCUTANEOUS AS NEEDED
Status: ACTIVE | OUTPATIENT
Start: 2020-01-01 | End: 2020-01-01

## 2020-01-01 RX ORDER — HYDROCORTISONE SODIUM SUCCINATE 100 MG/2ML
100 INJECTION, POWDER, FOR SOLUTION INTRAMUSCULAR; INTRAVENOUS AS NEEDED
Status: CANCELLED | OUTPATIENT
Start: 2020-01-01

## 2020-01-01 RX ORDER — HEPARIN 100 UNIT/ML
300-500 SYRINGE INTRAVENOUS AS NEEDED
Status: CANCELLED | OUTPATIENT
Start: 2020-01-01

## 2020-01-01 RX ORDER — PALONOSETRON 0.05 MG/ML
0.25 INJECTION, SOLUTION INTRAVENOUS ONCE
Status: CANCELLED | OUTPATIENT
Start: 2020-01-01

## 2020-01-01 RX ORDER — HEPARIN 100 UNIT/ML
300-500 SYRINGE INTRAVENOUS AS NEEDED
Status: CANCELLED
Start: 2020-01-01

## 2020-01-01 RX ORDER — LIDOCAINE HYDROCHLORIDE 20 MG/ML
20 INJECTION, SOLUTION INFILTRATION; PERINEURAL ONCE
Status: COMPLETED | OUTPATIENT
Start: 2020-01-01 | End: 2020-01-01

## 2020-01-01 RX ORDER — SODIUM CHLORIDE 9 MG/ML
10 INJECTION INTRAMUSCULAR; INTRAVENOUS; SUBCUTANEOUS AS NEEDED
Status: DISPENSED | OUTPATIENT
Start: 2020-01-01 | End: 2020-01-01

## 2020-01-01 RX ORDER — DEXAMETHASONE SODIUM PHOSPHATE 4 MG/ML
8 INJECTION, SOLUTION INTRA-ARTICULAR; INTRALESIONAL; INTRAMUSCULAR; INTRAVENOUS; SOFT TISSUE ONCE
Status: CANCELLED | OUTPATIENT
Start: 2020-01-01

## 2020-01-01 RX ORDER — LIDOCAINE AND PRILOCAINE 25; 25 MG/G; MG/G
CREAM TOPICAL AS NEEDED
Qty: 30 G | Refills: 0 | Status: SHIPPED | OUTPATIENT
Start: 2020-01-01

## 2020-01-01 RX ORDER — ALBUTEROL SULFATE 0.83 MG/ML
2.5 SOLUTION RESPIRATORY (INHALATION) AS NEEDED
Status: CANCELLED
Start: 2020-01-01

## 2020-01-01 RX ORDER — PALONOSETRON 0.05 MG/ML
0.25 INJECTION, SOLUTION INTRAVENOUS ONCE
Status: COMPLETED | OUTPATIENT
Start: 2020-01-01 | End: 2020-01-01

## 2020-01-01 RX ORDER — BARIUM SULFATE 20 MG/ML
900 SUSPENSION ORAL
Status: COMPLETED | OUTPATIENT
Start: 2020-01-01 | End: 2020-01-01

## 2020-01-01 RX ORDER — ONDANSETRON 2 MG/ML
8 INJECTION INTRAMUSCULAR; INTRAVENOUS ONCE
Status: COMPLETED | OUTPATIENT
Start: 2020-01-01 | End: 2020-01-01

## 2020-01-01 RX ORDER — SODIUM CHLORIDE 0.9 % (FLUSH) 0.9 %
10 SYRINGE (ML) INJECTION AS NEEDED
Status: CANCELLED | OUTPATIENT
Start: 2020-01-01

## 2020-01-01 RX ORDER — ONDANSETRON 4 MG/1
4 TABLET, ORALLY DISINTEGRATING ORAL
Qty: 40 TAB | Refills: 1 | Status: SHIPPED | OUTPATIENT
Start: 2020-01-01 | End: 2020-01-01

## 2020-01-01 RX ORDER — PROCHLORPERAZINE MALEATE 10 MG
5 TABLET ORAL
Qty: 60 TAB | Refills: 3 | Status: SHIPPED | OUTPATIENT
Start: 2020-01-01

## 2020-01-01 RX ORDER — IBUPROFEN 200 MG
400 TABLET ORAL
Status: CANCELLED | OUTPATIENT
Start: 2020-01-01

## 2020-01-01 RX ORDER — SODIUM CHLORIDE 9 MG/ML
25 INJECTION, SOLUTION INTRAVENOUS CONTINUOUS
Status: CANCELLED | OUTPATIENT
Start: 2020-01-01

## 2020-01-01 RX ORDER — GADOTERATE MEGLUMINE 376.9 MG/ML
11 INJECTION INTRAVENOUS
Status: COMPLETED | OUTPATIENT
Start: 2020-01-01 | End: 2020-01-01

## 2020-01-01 RX ORDER — SODIUM CHLORIDE 9 MG/ML
INJECTION INTRAMUSCULAR; INTRAVENOUS; SUBCUTANEOUS
Status: COMPLETED
Start: 2020-01-01 | End: 2020-01-01

## 2020-01-01 RX ORDER — LIDOCAINE HYDROCHLORIDE AND EPINEPHRINE 10; 10 MG/ML; UG/ML
20 INJECTION, SOLUTION INFILTRATION; PERINEURAL ONCE
Status: COMPLETED | OUTPATIENT
Start: 2020-01-01 | End: 2020-01-01

## 2020-01-01 RX ORDER — HEPARIN SODIUM 200 [USP'U]/100ML
200 INJECTION, SOLUTION INTRAVENOUS ONCE
Status: COMPLETED | OUTPATIENT
Start: 2020-01-01 | End: 2020-01-01

## 2020-01-01 RX ORDER — MIDAZOLAM HYDROCHLORIDE 1 MG/ML
5 INJECTION INTRAMUSCULAR; INTRAVENOUS
Status: DISCONTINUED | OUTPATIENT
Start: 2020-01-01 | End: 2020-01-01 | Stop reason: HOSPADM

## 2020-01-01 RX ORDER — HEPARIN 100 UNIT/ML
500 SYRINGE INTRAVENOUS ONCE
Status: COMPLETED | OUTPATIENT
Start: 2020-01-01 | End: 2020-01-01

## 2020-01-01 RX ORDER — FENTANYL CITRATE 50 UG/ML
200 INJECTION, SOLUTION INTRAMUSCULAR; INTRAVENOUS
Status: DISCONTINUED | OUTPATIENT
Start: 2020-01-01 | End: 2020-01-01 | Stop reason: HOSPADM

## 2020-01-01 RX ORDER — SODIUM CHLORIDE 9 MG/ML
25 INJECTION, SOLUTION INTRAVENOUS CONTINUOUS
Status: DISCONTINUED | OUTPATIENT
Start: 2020-01-01 | End: 2020-01-01 | Stop reason: HOSPADM

## 2020-01-01 RX ORDER — SODIUM CHLORIDE 0.9 % (FLUSH) 0.9 %
10 SYRINGE (ML) INJECTION
Status: COMPLETED | OUTPATIENT
Start: 2020-01-01 | End: 2020-01-01

## 2020-01-01 RX ADMIN — IOPAMIDOL 100 ML: 755 INJECTION, SOLUTION INTRAVENOUS at 10:06

## 2020-01-01 RX ADMIN — PALONOSETRON 0.25 MG: 0.05 INJECTION, SOLUTION INTRAVENOUS at 10:06

## 2020-01-01 RX ADMIN — DEXAMETHASONE SODIUM PHOSPHATE 12 MG: 4 INJECTION INTRA-ARTICULAR; INTRALESIONAL; INTRAMUSCULAR; INTRAVENOUS; SOFT TISSUE at 10:17

## 2020-01-01 RX ADMIN — Medication 500 UNITS: at 10:20

## 2020-01-01 RX ADMIN — Medication 10 ML: at 14:13

## 2020-01-01 RX ADMIN — Medication 500 UNITS: at 14:40

## 2020-01-01 RX ADMIN — SODIUM CHLORIDE 150 MG: 900 INJECTION, SOLUTION INTRAVENOUS at 10:33

## 2020-01-01 RX ADMIN — ETOPOSIDE 157 MG: 20 INJECTION, SOLUTION INTRAVENOUS at 09:12

## 2020-01-01 RX ADMIN — Medication 10 ML: at 10:25

## 2020-01-01 RX ADMIN — SODIUM CHLORIDE 150 MG: 900 INJECTION, SOLUTION INTRAVENOUS at 10:11

## 2020-01-01 RX ADMIN — PALONOSETRON 0.25 MG: 0.05 INJECTION, SOLUTION INTRAVENOUS at 12:10

## 2020-01-01 RX ADMIN — ZOLEDRONIC ACID 3.5 MG: 4 INJECTION INTRAVENOUS at 11:35

## 2020-01-01 RX ADMIN — Medication 10 ML: at 14:40

## 2020-01-01 RX ADMIN — Medication 10 ML: at 08:13

## 2020-01-01 RX ADMIN — SODIUM CHLORIDE 25 ML/HR: 900 INJECTION, SOLUTION INTRAVENOUS at 09:37

## 2020-01-01 RX ADMIN — PEGFILGRASTIM 6 MG: KIT SUBCUTANEOUS at 10:04

## 2020-01-01 RX ADMIN — Medication 10 ML: at 08:14

## 2020-01-01 RX ADMIN — GADOTERATE MEGLUMINE 11 ML: 376.9 INJECTION INTRAVENOUS at 17:00

## 2020-01-01 RX ADMIN — CARBOPLATIN 369 MG: 10 INJECTION, SOLUTION INTRAVENOUS at 13:59

## 2020-01-01 RX ADMIN — HEPARIN SODIUM IN SODIUM CHLORIDE: 200 INJECTION INTRAVENOUS at 09:33

## 2020-01-01 RX ADMIN — DEXAMETHASONE SODIUM PHOSPHATE 8 MG: 4 INJECTION INTRA-ARTICULAR; INTRALESIONAL; INTRAMUSCULAR; INTRAVENOUS; SOFT TISSUE at 08:22

## 2020-01-01 RX ADMIN — DURVALUMAB 1500 MG: 500 INJECTION, SOLUTION INTRAVENOUS at 11:00

## 2020-01-01 RX ADMIN — SODIUM CHLORIDE 10 ML: 9 INJECTION, SOLUTION INTRAMUSCULAR; INTRAVENOUS; SUBCUTANEOUS at 08:31

## 2020-01-01 RX ADMIN — Medication 500 UNITS: at 13:52

## 2020-01-01 RX ADMIN — CARBOPLATIN 369 MG: 10 INJECTION, SOLUTION INTRAVENOUS at 12:49

## 2020-01-01 RX ADMIN — CARBOPLATIN 369 MG: 10 INJECTION INTRAVENOUS at 12:20

## 2020-01-01 RX ADMIN — Medication 10 ML: at 13:54

## 2020-01-01 RX ADMIN — Medication 10 ML: at 10:31

## 2020-01-01 RX ADMIN — Medication 10 ML: at 13:52

## 2020-01-01 RX ADMIN — Medication 10 ML: at 08:31

## 2020-01-01 RX ADMIN — Medication 500 UNITS: at 10:31

## 2020-01-01 RX ADMIN — DEXAMETHASONE SODIUM PHOSPHATE 8 MG: 4 INJECTION, SOLUTION INTRAMUSCULAR; INTRAVENOUS at 09:28

## 2020-01-01 RX ADMIN — PEGFILGRASTIM 6 MG: KIT SUBCUTANEOUS at 10:27

## 2020-01-01 RX ADMIN — Medication 10 ML: at 10:13

## 2020-01-01 RX ADMIN — SODIUM CHLORIDE 25 ML/HR: 900 INJECTION, SOLUTION INTRAVENOUS at 09:10

## 2020-01-01 RX ADMIN — ETOPOSIDE 157 MG: 20 INJECTION, SOLUTION INTRAVENOUS at 09:49

## 2020-01-01 RX ADMIN — ETOPOSIDE 157 MG: 20 INJECTION INTRAVENOUS at 09:25

## 2020-01-01 RX ADMIN — Medication 10 ML: at 08:00

## 2020-01-01 RX ADMIN — ETOPOSIDE 157 MG: 20 INJECTION, SOLUTION INTRAVENOUS at 09:04

## 2020-01-01 RX ADMIN — SODIUM CHLORIDE 25 ML/HR: 900 INJECTION, SOLUTION INTRAVENOUS at 09:43

## 2020-01-01 RX ADMIN — SODIUM CHLORIDE 25 ML/HR: 900 INJECTION, SOLUTION INTRAVENOUS at 09:42

## 2020-01-01 RX ADMIN — SODIUM CHLORIDE 150 MG: 900 INJECTION, SOLUTION INTRAVENOUS at 10:21

## 2020-01-01 RX ADMIN — ZOLEDRONIC ACID 4 MG: 4 INJECTION, SOLUTION INTRAVENOUS at 09:43

## 2020-01-01 RX ADMIN — SODIUM CHLORIDE 25 ML/HR: 900 INJECTION, SOLUTION INTRAVENOUS at 08:14

## 2020-01-01 RX ADMIN — SODIUM CHLORIDE 25 ML/HR: 900 INJECTION, SOLUTION INTRAVENOUS at 08:25

## 2020-01-01 RX ADMIN — ETOPOSIDE 157 MG: 20 INJECTION, SOLUTION INTRAVENOUS at 13:31

## 2020-01-01 RX ADMIN — SODIUM CHLORIDE 25 ML/HR: 900 INJECTION, SOLUTION INTRAVENOUS at 09:44

## 2020-01-01 RX ADMIN — Medication 10 ML: at 08:10

## 2020-01-01 RX ADMIN — DEXAMETHASONE SODIUM PHOSPHATE 12 MG: 4 INJECTION INTRA-ARTICULAR; INTRALESIONAL; INTRAMUSCULAR; INTRAVENOUS; SOFT TISSUE at 10:09

## 2020-01-01 RX ADMIN — Medication 500 UNITS: at 11:22

## 2020-01-01 RX ADMIN — SODIUM CHLORIDE 25 ML/HR: 900 INJECTION, SOLUTION INTRAVENOUS at 08:13

## 2020-01-01 RX ADMIN — ETOPOSIDE 157 MG: 20 INJECTION, SOLUTION, CONCENTRATE INTRAVENOUS at 14:42

## 2020-01-01 RX ADMIN — SODIUM CHLORIDE 25 ML/HR: 900 INJECTION, SOLUTION INTRAVENOUS at 10:21

## 2020-01-01 RX ADMIN — SODIUM CHLORIDE 25 ML/HR: 900 INJECTION, SOLUTION INTRAVENOUS at 10:33

## 2020-01-01 RX ADMIN — Medication 10 ML: at 08:20

## 2020-01-01 RX ADMIN — DEXAMETHASONE SODIUM PHOSPHATE 8 MG: 4 INJECTION, SOLUTION INTRA-ARTICULAR; INTRALESIONAL; INTRAMUSCULAR; INTRAVENOUS; SOFT TISSUE at 08:22

## 2020-01-01 RX ADMIN — SODIUM CHLORIDE 25 ML/HR: 900 INJECTION, SOLUTION INTRAVENOUS at 09:51

## 2020-01-01 RX ADMIN — ETOPOSIDE 157 MG: 20 INJECTION INTRAVENOUS at 09:10

## 2020-01-01 RX ADMIN — DEXAMETHASONE SODIUM PHOSPHATE 8 MG: 4 INJECTION, SOLUTION INTRAMUSCULAR; INTRAVENOUS at 09:45

## 2020-01-01 RX ADMIN — HEPARIN 500 UNITS: 100 SYRINGE at 11:01

## 2020-01-01 RX ADMIN — Medication 30 ML: at 15:50

## 2020-01-01 RX ADMIN — Medication 10 ML: at 10:19

## 2020-01-01 RX ADMIN — PALONOSETRON 0.25 MG: 0.05 INJECTION, SOLUTION INTRAVENOUS at 10:53

## 2020-01-01 RX ADMIN — Medication 10 ML: at 08:15

## 2020-01-01 RX ADMIN — CARBOPLATIN 369 MG: 10 INJECTION INTRAVENOUS at 12:36

## 2020-01-01 RX ADMIN — Medication 500 UNITS: at 15:50

## 2020-01-01 RX ADMIN — SODIUM CHLORIDE 10 ML: 9 INJECTION, SOLUTION INTRAMUSCULAR; INTRAVENOUS; SUBCUTANEOUS at 08:14

## 2020-01-01 RX ADMIN — DEXAMETHASONE SODIUM PHOSPHATE 12 MG: 4 INJECTION, SOLUTION INTRA-ARTICULAR; INTRALESIONAL; INTRAMUSCULAR; INTRAVENOUS; SOFT TISSUE at 09:51

## 2020-01-01 RX ADMIN — Medication 10 ML: at 12:35

## 2020-01-01 RX ADMIN — DEXAMETHASONE SODIUM PHOSPHATE 12 MG: 4 INJECTION INTRA-ARTICULAR; INTRALESIONAL; INTRAMUSCULAR; INTRAVENOUS; SOFT TISSUE at 11:13

## 2020-01-01 RX ADMIN — DEXAMETHASONE SODIUM PHOSPHATE 12 MG: 4 INJECTION INTRA-ARTICULAR; INTRALESIONAL; INTRAMUSCULAR; INTRAVENOUS; SOFT TISSUE at 12:20

## 2020-01-01 RX ADMIN — Medication 500 UNITS: at 11:55

## 2020-01-01 RX ADMIN — ZOLEDRONIC ACID 3.5 MG: 4 INJECTION, SOLUTION, CONCENTRATE INTRAVENOUS at 10:41

## 2020-01-01 RX ADMIN — SODIUM CHLORIDE 25 ML/HR: 900 INJECTION, SOLUTION INTRAVENOUS at 09:56

## 2020-01-01 RX ADMIN — Medication 500 UNITS: at 10:10

## 2020-01-01 RX ADMIN — IOHEXOL 50 ML: 240 INJECTION, SOLUTION INTRATHECAL; INTRAVASCULAR; INTRAVENOUS; ORAL at 08:00

## 2020-01-01 RX ADMIN — Medication 30 ML: at 10:10

## 2020-01-01 RX ADMIN — PEGFILGRASTIM 6 MG: KIT SUBCUTANEOUS at 10:58

## 2020-01-01 RX ADMIN — SODIUM CHLORIDE 1500 MG: 9 INJECTION, SOLUTION INTRAVENOUS at 10:34

## 2020-01-01 RX ADMIN — ETOPOSIDE 157 MG: 20 INJECTION, SOLUTION INTRAVENOUS at 08:56

## 2020-01-01 RX ADMIN — MIDAZOLAM HYDROCHLORIDE 1 MG: 1 INJECTION, SOLUTION INTRAMUSCULAR; INTRAVENOUS at 09:01

## 2020-01-01 RX ADMIN — CARBOPLATIN 369 MG: 10 INJECTION, SOLUTION INTRAVENOUS at 11:50

## 2020-01-01 RX ADMIN — ZOLEDRONIC ACID 4 MG: 0.04 INJECTION, SOLUTION INTRAVENOUS at 08:45

## 2020-01-01 RX ADMIN — Medication 10 ML: at 10:20

## 2020-01-01 RX ADMIN — LIDOCAINE HYDROCHLORIDE 20 ML: 20 INJECTION, SOLUTION INFILTRATION; PERINEURAL at 09:33

## 2020-01-01 RX ADMIN — PEGFILGRASTIM 6 MG: KIT SUBCUTANEOUS at 11:16

## 2020-01-01 RX ADMIN — SODIUM CHLORIDE 25 ML/HR: 900 INJECTION, SOLUTION INTRAVENOUS at 08:17

## 2020-01-01 RX ADMIN — Medication 10 ML: at 11:41

## 2020-01-01 RX ADMIN — PALONOSETRON 0.25 MG: 0.05 INJECTION, SOLUTION INTRAVENOUS at 10:13

## 2020-01-01 RX ADMIN — LIDOCAINE HYDROCHLORIDE AND EPINEPHRINE 10 ML: 10; 10 INJECTION, SOLUTION INFILTRATION; PERINEURAL at 09:34

## 2020-01-01 RX ADMIN — SODIUM CHLORIDE 10 ML: 9 INJECTION, SOLUTION INTRAMUSCULAR; INTRAVENOUS; SUBCUTANEOUS at 08:20

## 2020-01-01 RX ADMIN — Medication 500 UNITS: at 14:09

## 2020-01-01 RX ADMIN — Medication 10 ML: at 10:06

## 2020-01-01 RX ADMIN — BARIUM SULFATE 900 ML: 21 SUSPENSION ORAL at 10:06

## 2020-01-01 RX ADMIN — SODIUM CHLORIDE 25 ML/HR: 900 INJECTION, SOLUTION INTRAVENOUS at 08:21

## 2020-01-01 RX ADMIN — IOPAMIDOL 100 ML: 755 INJECTION, SOLUTION INTRAVENOUS at 09:57

## 2020-01-01 RX ADMIN — Medication 10 ML: at 08:17

## 2020-01-01 RX ADMIN — HEPARIN 500 UNITS: 100 SYRINGE at 11:41

## 2020-01-01 RX ADMIN — SODIUM CHLORIDE 10 ML: 9 INJECTION INTRAMUSCULAR; INTRAVENOUS; SUBCUTANEOUS at 08:31

## 2020-01-01 RX ADMIN — Medication 500 UNITS: at 14:13

## 2020-01-01 RX ADMIN — Medication 10 ML: at 10:26

## 2020-01-01 RX ADMIN — Medication 10 ML: at 11:55

## 2020-01-01 RX ADMIN — DURVALUMAB 1500 MG: 500 INJECTION, SOLUTION INTRAVENOUS at 10:35

## 2020-01-01 RX ADMIN — FENTANYL CITRATE 25 MCG: 50 INJECTION INTRAMUSCULAR; INTRAVENOUS at 09:02

## 2020-01-01 RX ADMIN — SODIUM CHLORIDE 25 ML/HR: 900 INJECTION, SOLUTION INTRAVENOUS at 09:28

## 2020-01-01 RX ADMIN — Medication 500 UNITS: at 10:25

## 2020-01-01 RX ADMIN — SODIUM CHLORIDE 25 ML/HR: 900 INJECTION, SOLUTION INTRAVENOUS at 10:16

## 2020-01-01 RX ADMIN — SODIUM CHLORIDE 25 ML/HR: 900 INJECTION, SOLUTION INTRAVENOUS at 08:32

## 2020-01-01 RX ADMIN — Medication 500 UNITS: at 10:26

## 2020-01-01 RX ADMIN — Medication 10 ML: at 10:09

## 2020-01-01 RX ADMIN — ETOPOSIDE 157 MG: 20 INJECTION, SOLUTION INTRAVENOUS at 09:24

## 2020-01-01 RX ADMIN — SODIUM CHLORIDE 1500 MG: 9 INJECTION, SOLUTION INTRAVENOUS at 11:15

## 2020-01-01 RX ADMIN — DEXAMETHASONE SODIUM PHOSPHATE 8 MG: 4 INJECTION, SOLUTION INTRAMUSCULAR; INTRAVENOUS at 08:38

## 2020-01-01 RX ADMIN — ETOPOSIDE 157 MG: 20 INJECTION, SOLUTION INTRAVENOUS at 09:20

## 2020-01-01 RX ADMIN — SODIUM CHLORIDE 25 ML/HR: 900 INJECTION, SOLUTION INTRAVENOUS at 09:45

## 2020-01-01 RX ADMIN — WATER 2 G: 1 INJECTION INTRAMUSCULAR; INTRAVENOUS; SUBCUTANEOUS at 08:35

## 2020-01-01 RX ADMIN — DURVALUMAB 1500 MG: 500 INJECTION, SOLUTION INTRAVENOUS at 11:05

## 2020-01-01 RX ADMIN — SODIUM CHLORIDE 150 MG: 900 INJECTION, SOLUTION INTRAVENOUS at 09:53

## 2020-01-01 RX ADMIN — SODIUM CHLORIDE 150 MG: 900 INJECTION, SOLUTION INTRAVENOUS at 09:45

## 2020-01-01 RX ADMIN — ETOPOSIDE 157 MG: 20 INJECTION, SOLUTION, CONCENTRATE INTRAVENOUS at 09:04

## 2020-01-01 RX ADMIN — SODIUM CHLORIDE 10 ML: 9 INJECTION INTRAMUSCULAR; INTRAVENOUS; SUBCUTANEOUS at 08:15

## 2020-01-01 RX ADMIN — DEXAMETHASONE SODIUM PHOSPHATE 12 MG: 4 INJECTION, SOLUTION INTRA-ARTICULAR; INTRALESIONAL; INTRAMUSCULAR; INTRAVENOUS; SOFT TISSUE at 09:45

## 2020-01-01 RX ADMIN — ETOPOSIDE 157 MG: 20 INJECTION INTRAVENOUS at 13:10

## 2020-01-01 RX ADMIN — Medication 500 UNITS: at 12:35

## 2020-01-01 RX ADMIN — Medication 500 UNITS: at 10:50

## 2020-01-01 RX ADMIN — Medication 10 ML: at 08:07

## 2020-01-01 RX ADMIN — PALONOSETRON 0.25 MG: 0.05 INJECTION, SOLUTION INTRAVENOUS at 13:53

## 2020-01-01 RX ADMIN — DURVALUMAB 1500 MG: 500 INJECTION, SOLUTION INTRAVENOUS at 11:29

## 2020-01-01 RX ADMIN — HEPARIN 500 UNITS: 100 SYRINGE at 10:13

## 2020-01-01 RX ADMIN — PEGFILGRASTIM 6 MG: KIT SUBCUTANEOUS at 10:02

## 2020-01-01 RX ADMIN — ETOPOSIDE 157 MG: 20 INJECTION, SOLUTION INTRAVENOUS at 12:45

## 2020-01-01 RX ADMIN — SODIUM CHLORIDE 1500 MG: 9 INJECTION, SOLUTION INTRAVENOUS at 10:15

## 2020-01-01 RX ADMIN — SODIUM CHLORIDE 25 ML/HR: 900 INJECTION, SOLUTION INTRAVENOUS at 10:27

## 2020-01-01 RX ADMIN — Medication 10 ML: at 09:58

## 2020-01-01 RX ADMIN — FENTANYL CITRATE 25 MCG: 50 INJECTION INTRAMUSCULAR; INTRAVENOUS at 08:55

## 2020-01-01 RX ADMIN — ONDANSETRON 8 MG: 2 INJECTION INTRAMUSCULAR; INTRAVENOUS at 09:37

## 2020-01-01 RX ADMIN — PALONOSETRON 0.25 MG: 0.05 INJECTION, SOLUTION INTRAVENOUS at 09:45

## 2020-01-01 RX ADMIN — Medication 10 ML: at 08:12

## 2020-01-01 RX ADMIN — DURVALUMAB 1500 MG: 500 INJECTION, SOLUTION INTRAVENOUS at 12:25

## 2020-01-01 RX ADMIN — Medication 10 ML: at 14:09

## 2020-01-01 RX ADMIN — ZOLEDRONIC ACID 3.5 MG: 4 INJECTION, SOLUTION, CONCENTRATE INTRAVENOUS at 10:16

## 2020-01-01 RX ADMIN — DEXAMETHASONE SODIUM PHOSPHATE 8 MG: 4 INJECTION INTRA-ARTICULAR; INTRALESIONAL; INTRAMUSCULAR; INTRAVENOUS; SOFT TISSUE at 08:20

## 2020-01-01 RX ADMIN — MIDAZOLAM HYDROCHLORIDE 1 MG: 1 INJECTION, SOLUTION INTRAMUSCULAR; INTRAVENOUS at 08:55

## 2020-01-01 RX ADMIN — MIDAZOLAM HYDROCHLORIDE 1 MG: 1 INJECTION, SOLUTION INTRAMUSCULAR; INTRAVENOUS at 08:51

## 2020-01-01 RX ADMIN — Medication 500 UNITS: at 09:58

## 2020-01-01 RX ADMIN — DEXAMETHASONE SODIUM PHOSPHATE 8 MG: 4 INJECTION INTRA-ARTICULAR; INTRALESIONAL; INTRAMUSCULAR; INTRAVENOUS; SOFT TISSUE at 08:32

## 2020-01-01 RX ADMIN — Medication 500 UNITS: at 13:54

## 2020-01-01 RX ADMIN — ETOPOSIDE 157 MG: 20 INJECTION INTRAVENOUS at 13:03

## 2020-01-01 RX ADMIN — SODIUM CHLORIDE 25 ML/HR: 900 INJECTION, SOLUTION INTRAVENOUS at 08:09

## 2020-01-01 RX ADMIN — SODIUM CHLORIDE 369 MG: 9 INJECTION, SOLUTION INTRAVENOUS at 12:10

## 2020-01-01 RX ADMIN — DEXAMETHASONE SODIUM PHOSPHATE 8 MG: 4 INJECTION, SOLUTION INTRAMUSCULAR; INTRAVENOUS at 08:17

## 2020-01-01 RX ADMIN — DENOSUMAB 120 MG: 120 INJECTION SUBCUTANEOUS at 09:14

## 2020-01-01 RX ADMIN — SODIUM CHLORIDE 1500 MG: 9 INJECTION, SOLUTION INTRAVENOUS at 11:09

## 2020-01-01 RX ADMIN — SODIUM CHLORIDE 150 MG: 900 INJECTION, SOLUTION INTRAVENOUS at 10:27

## 2020-01-01 RX ADMIN — SODIUM CHLORIDE, PRESERVATIVE FREE 500 UNITS: 5 INJECTION INTRAVENOUS at 09:34

## 2020-01-01 RX ADMIN — FENTANYL CITRATE 25 MCG: 50 INJECTION INTRAMUSCULAR; INTRAVENOUS at 08:52

## 2020-01-01 RX ADMIN — DEXAMETHASONE SODIUM PHOSPHATE 8 MG: 4 INJECTION INTRA-ARTICULAR; INTRALESIONAL; INTRAMUSCULAR; INTRAVENOUS; SOFT TISSUE at 09:10

## 2020-01-01 RX ADMIN — Medication 10 ML: at 08:09

## 2020-01-01 RX ADMIN — ETOPOSIDE 157 MG: 20 INJECTION, SOLUTION, CONCENTRATE INTRAVENOUS at 08:55

## 2020-01-01 RX ADMIN — DEXAMETHASONE SODIUM PHOSPHATE 8 MG: 4 INJECTION INTRA-ARTICULAR; INTRALESIONAL; INTRAMUSCULAR; INTRAVENOUS; SOFT TISSUE at 08:13

## 2020-01-01 RX ADMIN — Medication 10 ML: at 08:21

## 2020-01-01 RX ADMIN — ETOPOSIDE 157 MG: 20 INJECTION INTRAVENOUS at 09:55

## 2020-01-01 RX ADMIN — ETOPOSIDE 157 MG: 20 INJECTION, SOLUTION INTRAVENOUS at 10:15

## 2020-01-01 RX ADMIN — Medication 40 ML: at 10:50

## 2020-06-10 ENCOUNTER — APPOINTMENT (OUTPATIENT)
Dept: CT IMAGING | Age: 70
End: 2020-06-10
Attending: PHYSICIAN ASSISTANT
Payer: MEDICARE

## 2020-06-10 ENCOUNTER — HOSPITAL ENCOUNTER (EMERGENCY)
Age: 70
Discharge: HOME | End: 2020-06-10
Attending: EMERGENCY MEDICINE
Payer: MEDICARE

## 2020-06-10 VITALS
HEIGHT: 63 IN | DIASTOLIC BLOOD PRESSURE: 69 MMHG | RESPIRATION RATE: 18 BRPM | OXYGEN SATURATION: 98 % | WEIGHT: 120.59 LBS | HEART RATE: 85 BPM | TEMPERATURE: 99.4 F | BODY MASS INDEX: 21.37 KG/M2 | SYSTOLIC BLOOD PRESSURE: 131 MMHG

## 2020-06-10 DIAGNOSIS — C34.92 PRIMARY MALIGNANT NEOPLASM OF LEFT LUNG METASTATIC TO OTHER SITE (HCC): Primary | ICD-10-CM

## 2020-06-10 LAB
ALBUMIN SERPL-MCNC: 3.2 G/DL (ref 3.5–5)
ALBUMIN/GLOB SERPL: 0.8 {RATIO} (ref 1.1–2.2)
ALP SERPL-CCNC: 298 U/L (ref 45–117)
ALT SERPL-CCNC: 121 U/L (ref 12–78)
ANION GAP SERPL CALC-SCNC: 9 MMOL/L (ref 5–15)
APPEARANCE UR: CLEAR
AST SERPL-CCNC: 130 U/L (ref 15–37)
BACTERIA URNS QL MICRO: NEGATIVE /HPF
BASOPHILS # BLD: 0.1 K/UL (ref 0–0.1)
BASOPHILS NFR BLD: 0 % (ref 0–1)
BILIRUB SERPL-MCNC: 1.6 MG/DL (ref 0.2–1)
BILIRUB UR QL CFM: NEGATIVE
BUN SERPL-MCNC: 16 MG/DL (ref 6–20)
BUN/CREAT SERPL: 21 (ref 12–20)
CALCIUM SERPL-MCNC: 9.4 MG/DL (ref 8.5–10.1)
CHLORIDE SERPL-SCNC: 101 MMOL/L (ref 97–108)
CK SERPL-CCNC: 91 U/L (ref 26–192)
CO2 SERPL-SCNC: 23 MMOL/L (ref 21–32)
COLOR UR: ABNORMAL
CREAT SERPL-MCNC: 0.75 MG/DL (ref 0.55–1.02)
DIFFERENTIAL METHOD BLD: ABNORMAL
EOSINOPHIL # BLD: 0 K/UL (ref 0–0.4)
EOSINOPHIL NFR BLD: 0 % (ref 0–7)
EPITH CASTS URNS QL MICRO: ABNORMAL /LPF
ERYTHROCYTE [DISTWIDTH] IN BLOOD BY AUTOMATED COUNT: 14.3 % (ref 11.5–14.5)
GLOBULIN SER CALC-MCNC: 3.8 G/DL (ref 2–4)
GLUCOSE SERPL-MCNC: 86 MG/DL (ref 65–100)
GLUCOSE UR STRIP.AUTO-MCNC: NEGATIVE MG/DL
HCT VFR BLD AUTO: 43.9 % (ref 35–47)
HGB BLD-MCNC: 15.2 G/DL (ref 11.5–16)
HGB UR QL STRIP: NEGATIVE
HYALINE CASTS URNS QL MICRO: ABNORMAL /LPF (ref 0–5)
IMM GRANULOCYTES # BLD AUTO: 0.1 K/UL (ref 0–0.04)
IMM GRANULOCYTES NFR BLD AUTO: 1 % (ref 0–0.5)
KETONES UR QL STRIP.AUTO: ABNORMAL MG/DL
LACTATE SERPL-SCNC: 1.1 MMOL/L (ref 0.4–2)
LEUKOCYTE ESTERASE UR QL STRIP.AUTO: NEGATIVE
LIPASE SERPL-CCNC: 142 U/L (ref 73–393)
LYMPHOCYTES # BLD: 0.8 K/UL (ref 0.8–3.5)
LYMPHOCYTES NFR BLD: 7 % (ref 12–49)
MAGNESIUM SERPL-MCNC: 2 MG/DL (ref 1.6–2.4)
MCH RBC QN AUTO: 31.3 PG (ref 26–34)
MCHC RBC AUTO-ENTMCNC: 34.6 G/DL (ref 30–36.5)
MCV RBC AUTO: 90.3 FL (ref 80–99)
MONOCYTES # BLD: 1.3 K/UL (ref 0–1)
MONOCYTES NFR BLD: 11 % (ref 5–13)
NEUTS SEG # BLD: 9.3 K/UL (ref 1.8–8)
NEUTS SEG NFR BLD: 81 % (ref 32–75)
NITRITE UR QL STRIP.AUTO: NEGATIVE
NRBC # BLD: 0 K/UL (ref 0–0.01)
NRBC BLD-RTO: 0 PER 100 WBC
PH UR STRIP: 5 [PH] (ref 5–8)
PLATELET # BLD AUTO: 166 K/UL (ref 150–400)
PMV BLD AUTO: 10.5 FL (ref 8.9–12.9)
POTASSIUM SERPL-SCNC: 4.1 MMOL/L (ref 3.5–5.1)
PROT SERPL-MCNC: 7 G/DL (ref 6.4–8.2)
PROT UR STRIP-MCNC: 100 MG/DL
RBC # BLD AUTO: 4.86 M/UL (ref 3.8–5.2)
RBC #/AREA URNS HPF: ABNORMAL /HPF (ref 0–5)
SODIUM SERPL-SCNC: 133 MMOL/L (ref 136–145)
SP GR UR REFRACTOMETRY: 1.03 (ref 1–1.03)
TROPONIN I SERPL-MCNC: <0.05 NG/ML
UA: UC IF INDICATED,UAUC: ABNORMAL
UROBILINOGEN UR QL STRIP.AUTO: 1 EU/DL (ref 0.2–1)
WBC # BLD AUTO: 11.6 K/UL (ref 3.6–11)
WBC URNS QL MICRO: ABNORMAL /HPF (ref 0–4)

## 2020-06-10 PROCEDURE — 99284 EMERGENCY DEPT VISIT MOD MDM: CPT

## 2020-06-10 PROCEDURE — 80053 COMPREHEN METABOLIC PANEL: CPT

## 2020-06-10 PROCEDURE — 82550 ASSAY OF CK (CPK): CPT

## 2020-06-10 PROCEDURE — 71260 CT THORAX DX C+: CPT

## 2020-06-10 PROCEDURE — 83605 ASSAY OF LACTIC ACID: CPT

## 2020-06-10 PROCEDURE — 83690 ASSAY OF LIPASE: CPT

## 2020-06-10 PROCEDURE — 83735 ASSAY OF MAGNESIUM: CPT

## 2020-06-10 PROCEDURE — 81001 URINALYSIS AUTO W/SCOPE: CPT

## 2020-06-10 PROCEDURE — 74177 CT ABD & PELVIS W/CONTRAST: CPT

## 2020-06-10 PROCEDURE — 74011636320 HC RX REV CODE- 636/320: Performed by: EMERGENCY MEDICINE

## 2020-06-10 PROCEDURE — 36415 COLL VENOUS BLD VENIPUNCTURE: CPT

## 2020-06-10 PROCEDURE — 85025 COMPLETE CBC W/AUTO DIFF WBC: CPT

## 2020-06-10 PROCEDURE — 84484 ASSAY OF TROPONIN QUANT: CPT

## 2020-06-10 PROCEDURE — 93005 ELECTROCARDIOGRAM TRACING: CPT

## 2020-06-10 RX ORDER — SODIUM CHLORIDE 0.9 % (FLUSH) 0.9 %
10 SYRINGE (ML) INJECTION
Status: COMPLETED | OUTPATIENT
Start: 2020-06-10 | End: 2020-06-10

## 2020-06-10 RX ADMIN — IOPAMIDOL 100 ML: 755 INJECTION, SOLUTION INTRAVENOUS at 17:57

## 2020-06-10 RX ADMIN — IOPAMIDOL 100 ML: 755 INJECTION, SOLUTION INTRAVENOUS at 18:59

## 2020-06-10 RX ADMIN — Medication 10 ML: at 17:57

## 2020-06-10 RX ADMIN — Medication 10 ML: at 18:59

## 2020-06-10 NOTE — DISCHARGE INSTRUCTIONS
Patient Education        Lung Cancer: Care Instructions  Your Care Instructions     Lung cancer occurs when abnormal cells grow out of control in the lung. Lung cancer can start anywhere in the lungs and spread to other parts of the body. Treatment is based on the type and stage of lung cancer and other factors, such as your overall health. Treatment may include surgery, radiation therapy, or chemotherapy. It may also include immunotherapy or targeted therapy. Being treated for cancer can weaken your body, and you may feel very tired. Home treatment and certain medicines can help you feel better. Finding out that you have cancer is scary. You may feel many emotions and may need some help coping. Seek out family, friends, and counselors for support. You also can do things at home to make yourself feel better while you go through treatment. Call the [facility] ([phone]) or visit its website at [address] [employer] for more information. Follow-up care is a key part of your treatment and safety. Be sure to make and go to all appointments, and call your doctor if you are having problems. It's also a good idea to know your test results and keep a list of the medicines you take. How can you care for yourself at home? · Take your medicines exactly as prescribed. Call your doctor if you think you are having a problem with your medicine. You will get more details on the specific medicines your doctor prescribes. · Follow your doctor's instructions to relieve pain. Use pain medicine when you first feel pain, before it becomes severe. Taking pain medicines regularly is often the best way to keep pain under control. · Eat healthy food. If you do not feel like eating, try to eat food that has protein and extra calories to keep up your strength and prevent weight loss. Drink liquid meal replacements for extra calories and protein. Try to eat your main meal early.  Eating smaller portions more often may help as well.  · Get some physical activity every day, but do not get too tired. Keep doing the hobbies you enjoy as your energy allows. · Do not smoke. Smoking can make your cancer symptoms worse. If you need help quitting, talk to your doctor about stop-smoking programs and medicines. These can increase your chances of quitting for good. · If you use oxygen, do not smoke, light a cigarette, or use a flame while your oxygen is on. Smoking while using oxygen can lead to fire and even explosion. · If you have nausea, try to eat several small meals a day. When you feel better, eat clear soups and mild foods until all symptoms are gone for 12 to 48 hours. Other good choices include dry toast, crackers, cooked cereal, and gelatin dessert, such as Jell-O.  · If you are vomiting or have diarrhea:  ? Drink plenty of fluids (enough so that your urine is light yellow or clear like water) to prevent dehydration. Choose water and other caffeine-free clear liquids. If you have kidney, heart, or liver disease and have to limit fluids, talk with your doctor before you increase the amount of fluids you drink. ? When you are able to eat, try clear soups, mild foods, and liquids until all symptoms are gone for 12 to 48 hours. Other good choices include dry toast, crackers, cooked cereal, and gelatin dessert, such as Jell-O.  · Take steps to control your stress and workload. Learn relaxation techniques. ? Share your feelings. Stress and tension affect our emotions. By expressing your feelings to others, you may be able to understand and cope with them. ? Consider joining a support group. Talking about a problem with your spouse, a good friend, or other people with similar problems is a good way to reduce tension and stress. ? Express yourself with art. Try writing, crafts, dance, or art to relieve stress. Some dance, writing, or art groups may be available just for people who have cancer. ? Be kind to your body and mind.  Getting enough sleep, eating a healthy diet, and taking time to do things you enjoy can contribute to an overall feeling of balance in your life and help reduce stress. ? Get help if you need it. Discuss your concerns with your doctor or counselor. · If you have not already done so, prepare a list of advance directives. Advance directives are instructions to your doctor and family members about what kind of care you want if you become unable to speak or express yourself. When should you call for help? [id number] anytime you think you may need emergency care. For example, call if:  · You passed out (lost consciousness). · You have severe trouble breathing. · You cough up a lot of blood. Call your doctor now or seek immediate medical care if:  · You have a fever. · You are short of breath. · You have new or worse pain. · You have a new or worse cough. · You think you have an infection. Watch closely for changes in your health, and be sure to contact your doctor if:  · You do not get better as expected. Where can you learn more? Go to http://live-beth.info/  Enter D847 in the search box to learn more about \"Lung Cancer: Care Instructions. \"  Current as of: August 22, 2019               Content Version: 12.5  © 0681-8174 [employer]. Care instructions adapted under license by [employer] (which disclaims liability or warranty for this information). If you have questions about a medical condition or this instruction, always ask your healthcare professional. [place] [address] any warranty or liability for your use of this information.

## 2020-06-10 NOTE — ED NOTES
Bedside and Verbal shift change report given to melissa  (oncoming nurse) by Yuniel Fatima (offgoing nurse). Report included the following information SBAR, ED Summary, MAR and Recent Results. Pt on monitor x2. VSS. Call bell in reach.  at bedside for emotional support. Will continue to monitor.

## 2020-06-11 ENCOUNTER — PATIENT OUTREACH (OUTPATIENT)
Dept: INTERNAL MEDICINE CLINIC | Age: 70
End: 2020-06-11

## 2020-06-11 LAB
ATRIAL RATE: 84 BPM
CALCULATED P AXIS, ECG09: 73 DEGREES
CALCULATED R AXIS, ECG10: 17 DEGREES
CALCULATED T AXIS, ECG11: 118 DEGREES
DIAGNOSIS, 93000: NORMAL
P-R INTERVAL, ECG05: 108 MS
Q-T INTERVAL, ECG07: 402 MS
QRS DURATION, ECG06: 114 MS
QTC CALCULATION (BEZET), ECG08: 475 MS
VENTRICULAR RATE, ECG03: 84 BPM

## 2020-06-11 NOTE — ED NOTES
Pt discharged by PA. Pt provided with discharge instructions Rx and instructions on follow up care. Pt ambulated out of ED with no apparent difficulty accompanied by RN.

## 2020-06-11 NOTE — ED PROVIDER NOTES
EMERGENCY DEPARTMENT HISTORY AND PHYSICAL EXAM 
 
 
Date: 6/10/2020 Patient Name: Diane Phelps History of Presenting Illness Chief Complaint Patient presents with  Epigastric Pain Ambulatory into the ED with c/o abdominal bloating and tenderness with palpation x 2-3 weeks. Evaluated at [facility] - sent to the ED d/t wbc count of 11.8 (abdominal xray and urine dip also performed - pt has results). No obvious distress noted. History Provided By: Patient HPI: Diane Phelps, 65 y.o. female with significant PMHx for CAD and HTN, presents by POV to the ED with cc of abdominal distention and mild pain. Patient states that her symptoms started several weeks ago and have progressively gotten worse. The pain became unbearable last night, which prompted [facility]. From there she was referred to come to the ED for additional evaluation. The patient notes that she also has a appointment scheduled with her PCP for the same complaint in 1.5 weeks. The patient denies fever, chills, nausea, vomiting, weight gain, or weight loss. There is been no diarrhea or urinary complaints. She denies change in appetite. There are no other complaints, changes, or physical findings at this time. 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 
 
No current facility-administered medications on file prior to encounter. Current Outpatient Medications on File Prior to Encounter Medication Sig Dispense Refill  clopidogrel (PLAVIX) 75 mg tab Take 1 Tab by mouth daily. 30 Tab 12  
 diphenhydrAMINE (BENADRYL) 25 mg capsule Take 1 Cap by mouth every six (6) hours as needed for Itching.  20 Cap 0  
  aspirin delayed-release 81 mg tablet Take 81 mg by mouth daily.  metoprolol succinate (TOPROL-XL) 25 mg XL tablet Take 25 mg by mouth daily.  cholecalciferol (VITAMIN D3) 1,000 unit cap Take  by mouth daily.  calcium carbonate (TUMS) 200 mg calcium (500 mg) chew Take 1 Tab by mouth daily.  tiotropium bromide (SPIRIVA RESPIMAT) 2.5 mcg/actuation inhaler Take 2 Puffs by inhalation daily. Past History Past Medical History: 
Past Medical History:  
Diagnosis Date  CAD (coronary artery disease) Past Surgical History: 
Past Surgical History:  
Procedure Laterality Date  HX HEART CATHETERIZATION    
 HX HYSTERECTOMY Family History: 
History reviewed. No pertinent family history. Social History: 
Social History Tobacco Use  Smoking status: Former Smoker  Smokeless tobacco: Never Used Substance Use Topics  Alcohol use: Never Frequency: Never  Drug use: Never Allergies: Allergies Allergen Reactions  Codeine Itching Review of Systems Review of Systems Constitutional: Negative for chills, diaphoresis and fever. HENT: Negative for congestion, ear pain, rhinorrhea and sore throat. Respiratory: Negative for cough and shortness of breath. Cardiovascular: Negative for chest pain. Gastrointestinal: Positive for abdominal pain. Negative for constipation, diarrhea, nausea and vomiting. Genitourinary: Negative for difficulty urinating, dysuria, frequency and hematuria. Musculoskeletal: Negative for arthralgias and myalgias. Neurological: Negative for headaches. All other systems reviewed and are negative. Physical Exam  
Physical Exam 
Vitals signs and nursing note reviewed. Constitutional:   
   General: She is not in acute distress. Appearance: She is well-developed. She is not diaphoretic. Comments: Thin 65 y.o.  female HENT:  
   Head: Normocephalic and atraumatic. Eyes: General:     
   Right eye: No discharge. Left eye: No discharge. Conjunctiva/sclera: Conjunctivae normal.  
Neck: Musculoskeletal: Normal range of motion and neck supple. Cardiovascular:  
   Rate and Rhythm: Normal rate and regular rhythm. Heart sounds: Normal heart sounds. No murmur. Pulmonary:  
   Effort: Pulmonary effort is normal. No respiratory distress. Breath sounds: Normal breath sounds. Abdominal:  
   General: There is distension. Tenderness: There is abdominal tenderness. There is no right CVA tenderness, left CVA tenderness, guarding or rebound. Skin: 
   General: Skin is warm and dry. Neurological:  
   Mental Status: She is alert and oriented to person, place, and time. Psychiatric:     
   Behavior: Behavior normal.  
 
 
 
Diagnostic Study Results Labs - Recent Results (from the past 12 hour(s)) EKG, 12 LEAD, INITIAL Collection Time: 06/10/20  2:58 PM  
Result Value Ref Range Ventricular Rate 84 BPM  
 Atrial Rate 84 BPM  
 P-R Interval 108 ms QRS Duration 114 ms Q-T Interval 402 ms QTC Calculation (Bezet) 475 ms Calculated P Axis 73 degrees Calculated R Axis 17 degrees Calculated T Axis 118 degrees Diagnosis Sinus rhythm with short ID Incomplete left bundle branch block ST & T wave abnormality, consider lateral ischemia When compared with ECG of 23-JUL-2019 10:40, 
ID interval has decreased Vent. rate has increased BY  28 BPM 
Questionable change in QRS axis T wave inversion now evident in Lateral leads CBC WITH AUTOMATED DIFF Collection Time: 06/10/20  3:37 PM  
Result Value Ref Range WBC 11.6 (H) 3.6 - 11.0 K/uL  
 RBC 4.86 3.80 - 5.20 M/uL  
 HGB 15.2 11.5 - 16.0 g/dL HCT 43.9 35.0 - 47.0 % MCV 90.3 80.0 - 99.0 FL  
 MCH 31.3 26.0 - 34.0 PG  
 MCHC 34.6 30.0 - 36.5 g/dL  
 RDW 14.3 11.5 - 14.5 % PLATELET 315 123 - 054 K/uL MPV 10.5 8.9 - 12.9 FL  
 NRBC 0.0 0  WBC ABSOLUTE NRBC 0.00 0.00 - 0.01 K/uL NEUTROPHILS 81 (H) 32 - 75 % LYMPHOCYTES 7 (L) 12 - 49 % MONOCYTES 11 5 - 13 % EOSINOPHILS 0 0 - 7 % BASOPHILS 0 0 - 1 % IMMATURE GRANULOCYTES 1 (H) 0.0 - 0.5 % ABS. NEUTROPHILS 9.3 (H) 1.8 - 8.0 K/UL  
 ABS. LYMPHOCYTES 0.8 0.8 - 3.5 K/UL  
 ABS. MONOCYTES 1.3 (H) 0.0 - 1.0 K/UL  
 ABS. EOSINOPHILS 0.0 0.0 - 0.4 K/UL  
 ABS. BASOPHILS 0.1 0.0 - 0.1 K/UL  
 ABS. IMM. GRANS. 0.1 (H) 0.00 - 0.04 K/UL  
 DF AUTOMATED METABOLIC PANEL, COMPREHENSIVE Collection Time: 06/10/20  3:37 PM  
Result Value Ref Range Sodium 133 (L) 136 - 145 mmol/L Potassium 4.1 3.5 - 5.1 mmol/L Chloride 101 97 - 108 mmol/L  
 CO2 23 21 - 32 mmol/L Anion gap 9 5 - 15 mmol/L Glucose 86 65 - 100 mg/dL BUN 16 6 - 20 MG/DL Creatinine 0.75 0.55 - 1.02 MG/DL  
 BUN/Creatinine ratio 21 (H) 12 - 20 GFR est AA >60 >60 ml/min/1.73m2 GFR est non-AA >60 >60 ml/min/1.73m2 Calcium 9.4 8.5 - 10.1 MG/DL Bilirubin, total 1.6 (H) 0.2 - 1.0 MG/DL  
 ALT (SGPT) 121 (H) 12 - 78 U/L  
 AST (SGOT) 130 (H) 15 - 37 U/L Alk. phosphatase 298 (H) 45 - 117 U/L Protein, total 7.0 6.4 - 8.2 g/dL Albumin 3.2 (L) 3.5 - 5.0 g/dL Globulin 3.8 2.0 - 4.0 g/dL A-G Ratio 0.8 (L) 1.1 - 2.2    
TROPONIN I Collection Time: 06/10/20  3:37 PM  
Result Value Ref Range Troponin-I, Qt. <0.05 <0.05 ng/mL LIPASE Collection Time: 06/10/20  3:37 PM  
Result Value Ref Range Lipase 142 73 - 393 U/L  
LACTIC ACID Collection Time: 06/10/20  3:37 PM  
Result Value Ref Range Lactic acid 1.1 0.4 - 2.0 MMOL/L  
CK W/ REFLX CKMB Collection Time: 06/10/20  3:37 PM  
Result Value Ref Range CK 91 26 - 192 U/L  
MAGNESIUM Collection Time: 06/10/20  3:37 PM  
Result Value Ref Range Magnesium 2.0 1.6 - 2.4 mg/dL URINALYSIS W/ REFLEX CULTURE Collection Time: 06/10/20  5:03 PM  
Result Value Ref Range Color DARK YELLOW  Appearance CLEAR CLEAR    
 Specific gravity 1.026 1.003 - 1.030    
 pH (UA) 5.0 5.0 - 8.0 Protein 100 (A) NEG mg/dL Glucose Negative NEG mg/dL Ketone TRACE (A) NEG mg/dL Blood Negative NEG Urobilinogen 1.0 0.2 - 1.0 EU/dL Nitrites Negative NEG Leukocyte Esterase Negative NEG    
 WBC 0-4 0 - 4 /hpf  
 RBC 0-5 0 - 5 /hpf Epithelial cells FEW FEW /lpf Bacteria Negative NEG /hpf  
 UA:UC IF INDICATED CULTURE NOT INDICATED BY UA RESULT CNI Hyaline cast 2-5 0 - 5 /lpf  
BILIRUBIN, CONFIRM Collection Time: 06/10/20  5:03 PM  
Result Value Ref Range Bilirubin UA, confirm Negative NEG Radiologic Studies -  
CT CHEST W CONT Final Result IMPRESSION:    
1. Confluent left lower lobe perihilar mass extending into the left hilum and  
posterior mediastinum measuring 5.6 x 4.6 x 4.4 cm, favored to represent primary  
lung malignancy. Areas of fingerlike, tubular extension of the tumor in the  
superior segment of the left upper lobe, at least in part representing  
endobronchial spread. Encasement and severe narrowing of the left lower lobe  
segmental pulmonary arteries. Encasement of the left lower lobe segmental  
bronchi, but with only mild narrowing. 2. Additional enlarged left lower paratracheal lymph node, favored to represent  
gloria metastasis. 3. Please see dedicated CT abdomen/pelvis for description of diffuse hepatic  
metastatic disease and upper abdominal gloria metastases. 4. Severe emphysema. 23X  
  
  
  
  
CT ABD PELV W CONT Final Result IMPRESSION:  
  
1. Diffuse hepatic metastatic disease with markedly enlarged liver. 2. Multiple gloria metastases in the upper abdomen. 3. Suspected left hilar mass on  images, which may represent a primary lung  
malignancy. Recommend CT chest for further evaluation. 4. Small volume ascites in the pelvis. The findings were called to Faraz Thibodeaux in the ER on 6/10/2020 at 6:34 PM by Dr. Ganga Calvert. [facility] Results  (Last 48 hours) 06/10/20 1900  CT CHEST W CONT Final result Impression:  IMPRESSION:    
1. Confluent left lower lobe perihilar mass extending into the left hilum and  
posterior mediastinum measuring 5.6 x 4.6 x 4.4 cm, favored to represent primary  
lung malignancy. Areas of fingerlike, tubular extension of the tumor in the  
superior segment of the left upper lobe, at least in part representing  
endobronchial spread. Encasement and severe narrowing of the left lower lobe  
segmental pulmonary arteries. Encasement of the left lower lobe segmental  
bronchi, but with only mild narrowing. 2. Additional enlarged left lower paratracheal lymph node, favored to represent  
gloria metastasis. 3. Please see dedicated CT abdomen/pelvis for description of diffuse hepatic  
metastatic disease and upper abdominal gloria metastases. 4. Severe emphysema. 23X Narrative:  EXAM:  CT CHEST W CONT INDICATION:  concern for lung cancer COMPARISON: CT abdomen/pelvis 6/10/2020, chest radiograph 7/10/2019. CONTRAST:  100 cc of Isovue-370. TECHNIQUE: Multislice helical CT was performed from the thoracic inlet to the  
adrenal glands after the uneventful administration of intravenous contrast.  
Contiguous 5 mm axial images were reconstructed and lung and soft tissue windows  
were generated. Coronal and sagittal reformations were generated. CT dose  
reduction was achieved through use of a standardized protocol tailored for this  
examination and automatic exposure control for dose modulation. FINDINGS:  
Lungs/Pleura: There is a confluent left lower lobe perihilar mass extending into  
the left hilum and posterior mediastinum measuring approximately 5.6 x 4.6 x 4.4  
cm (series 301 image 28 and series 604 image 80). There is encasement and severe  
narrowing of the left lower lobe segmental pulmonary arteries. There is encasement of the left lower lobe segmental bronchi, but with only mild  
narrowing. There are some adjacent areas of fingerlike, tubular tumor extension  
in the superior segment of the left lower lobe (series 301 image 26, image 29,  
and image 27). There is severe emphysema. No additional suspicious pulmonary  
nodules are identified. No pleural effusion. Axilla/Soft Tissue: No pathologic axillary adenopathy. Mediastinum: The heart is normal in size. No pericardial effusion. Coronary  
artery stents noted. Enlarged left lower paratracheal lymph node measuring 12 mm  
(series 301 image 22). Moderate calcific atherosclerosis of the abdominal aorta. Upper Abdomen: Please see dedicated CT abdomen/pelvis performed concurrently for  
description of diffuse hepatic metastatic disease and upper abdominal gloria  
metastases. Bones: No evidence of acute fracture, dislocation, or aggressive osseous  
abnormality. 06/10/20 1757  CT ABD PELV W CONT Final result Impression:  IMPRESSION:  
   
1. Diffuse hepatic metastatic disease with markedly enlarged liver. 2. Multiple gloria metastases in the upper abdomen. 3. Suspected left hilar mass on  images, which may represent a primary lung  
malignancy. Recommend CT chest for further evaluation. 4. Small volume ascites in the pelvis. The findings were called to Sharonda Francisco in the ER on 6/10/2020 at 6:34 PM by Dr. Berta Scott. [facility] Narrative:  EXAM:  CT ABD PELV W CONT INDICATION: abdominal pain COMPARISON: None. CONTRAST:  100 mL of Isovue-370. TECHNIQUE:   
Following the uneventful intravenous administration of contrast, thin axial  
images were obtained through the abdomen and pelvis. Coronal and sagittal  
reconstructions were generated. Oral contrast was not administered. CT dose  
reduction was achieved through use of a standardized protocol tailored for this examination and automatic exposure control for dose modulation. FINDINGS:   
Lower Thorax:  
Lung Bases: There appears to be a left hilar mass on  images. No pleural  
effusion. Heart: The heart is normal in size. No pericardial effusion. Abdomen/Pelvis:  
Liver:  Markedly enlarged liver with diffuse infiltration of the hepatic  
parenchyma with innumerable confluent hypodense lesions involving all segments,  
some with areas of associated capsular retraction. Biliary system: Gallbladder is contracted, with wall edema likely secondary to  
hepatocellular disease. No intrahepatic or extrahepatic biliary ductal  
dilatation. Spleen: Normal.  
   
Pancreas: Normal.  
   
Kidneys/Ureters/Bladder: No renal masses. No renal or ureteral calculi. No  
hydronephrosis or hydroureter. The bladder is normal.  
   
Adrenals: Normal.  
   
Stomach/bowel: No dilation or abnormal wall thickening is present. No free  
intraperitoneal air noted. Normal appendix. Reproductive Organs: Status post hysterectomy. No suspicious adnexal masses. Vasculature: Normal caliber arteries. Severe calcific atherosclerosis of the  
abdominal aorta, iliac vasculature, and visceral branches. Portal vein, SMV, and  
splenic vein are patent. Nodes: There are multiple enlarged lymph nodes in the upper abdomen at the olya  
hepatis, for example, measuring 2.9 x 2.4 cm (series 2 image 29), and measuring 3.6 x 1.7 cm (series 2 image 24). Fluid: Small volume free fluid in the pelvis. Bones/Soft Tissue: No acute fractures or aggressive osseous lesions are seen. Grade 1 anterolisthesis of L4 on L5 secondary to severe lower lumbar facet  
arthropathy. Degenerative disc disease in the lumbar spine most advanced at L4-L5. Medical Decision Making I am the first provider for this patient.  
 
I reviewed the vital signs, available nursing notes, past medical history, past surgical history, family history and social history. Vital Signs-Reviewed the patient's vital signs. Patient Vitals for the past 12 hrs: 
 Temp Pulse Resp BP SpO2  
06/10/20 1830 99.4 °F (37.4 °C) 85 18 131/69 98 % 06/10/20 1700    145/64 98 % 06/10/20 1658     99 % 06/10/20 1657    136/72   
06/10/20 1445 98.6 °F (37 °C) 86 17 162/84 100 % Records Reviewed: Nursing Notes Provider Notes (Medical Decision Making):  
Patient presents the ED with abdominal distention but no real associated complaint. Vital signs are stable. Differential diagnosis include gallbladder disease, GERD, gastric ulcer, mass/tumor, SBO. Unfortunately, CT of the abdomen revealed likely metastatic cancer to her liver with an unknown primary source. It was recommended that a CT of her chest be done for concern of lung CA, which was positive for mass in the left lung. Patient has been referred to oncology to be evaluated as an outpatient. This information was conveyed to both her and her  while in the ED. ED Course:  
Initial assessment performed. The patients presenting problems have been discussed, and they are in agreement with the care plan formulated and outlined with them. I have encouraged them to ask questions as they arise throughout their visit. 7:45 PM 
Case discussed with Paramjit Ornelas DO who is in agreement with the plan. 8:10 PM 
Informed the patient and her  simultaneously about the diagnosis of lung CA with mets to the liver. Critical Care Time: None Disposition: 
DISCHARGE NOTE: 
8:38 PM 
The pt is ready for discharge. The pt's signs, symptoms, diagnosis, and discharge instructions have been discussed and pt has conveyed their understanding. The pt is to follow up as recommended or return to ER should their symptoms worsen. Plan has been discussed and pt is in agreement. PLAN: 
1.   
Current Discharge Medication List  
  
 CONTINUE these medications which have NOT CHANGED Details  
clopidogrel (PLAVIX) 75 mg tab Take 1 Tab by mouth daily. Qty: 30 Tab, Refills: 12  
  
diphenhydrAMINE (BENADRYL) 25 mg capsule Take 1 Cap by mouth every six (6) hours as needed for Itching. Qty: 20 Cap, Refills: 0  
  
aspirin delayed-release 81 mg tablet Take 81 mg by mouth daily. metoprolol succinate (TOPROL-XL) 25 mg XL tablet Take 25 mg by mouth daily. cholecalciferol (VITAMIN D3) 1,000 unit cap Take  by mouth daily. calcium carbonate (TUMS) 200 mg calcium (500 mg) chew Take 1 Tab by mouth daily. tiotropium bromide (SPIRIVA RESPIMAT) 2.5 mcg/actuation inhaler Take 2 Puffs by inhalation daily. 2.  
Follow-up Information Follow up With Specialties Details Why Contact Info Ciro Jin MD Hematology and Oncology, Internal Medicine, Hematology, Oncology In 2 days  [facility]. 
[phone] 
  
  
3. Stop smoking Return to ED if worse Diagnosis Clinical Impression: 1. 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) Please note that this dictation was completed with [employer], the computer voice recognition software. Quite often unanticipated grammatical, syntax, homophones, and other interpretive errors are inadvertently transcribed by the computer software. Please disregards these errors. Please excuse any errors that have escaped final proofreading. This note will not be viewable in [address]

## 2020-06-12 ENCOUNTER — PATIENT OUTREACH (OUTPATIENT)
Dept: INTERNAL MEDICINE CLINIC | Age: 70
End: 2020-06-12

## 2020-06-15 NOTE — PROGRESS NOTES
Lona Kasper is a 65 y.o. female here for new patient appt for:  Chief Complaint   Patient presents with    New Patient     lung cancer/met     1. Have you been to the ER, urgent care clinic since your last visit? Hospitalized since your last visit? New Pt    2. Have you seen or consulted any other health care providers outside of the [address] [place] [address] since your last visit? Include any pap smears or colon screening. New Pt    Pt here with . Pt works full time.  is retired. Pt was in ED 6/10/20 for epigastric pain, bloating, and tenderness . Originally went to [facility] and they sent her to ED. CT done showed lung malignancy.

## 2020-06-15 NOTE — PROGRESS NOTES
Oncology Navigator  Psychosocial Assessment    Reason for Assessment:    []Depression  []Anxiety  []Caregiver [place]  []Maladaptive Coping with Serious Illness   [] Social Work Referral [x] Initial Assessment  [] Other     Sources of Information:    [x]Patient  [x]Family  [x]Staff  [x]Medical Record    Advance Care Planning:  Advance Care Planning 7/23/2019   Confirm Advance Directive None       Mental Status:    [x]Alert  []Lethargic  []Unresponsive   [] Unable to assess   Oriented to:  [x]Person  [x]Place  [x]Time  [x]Situation      Barriers to Learning:    []Language  []Developmental  []Cognitive  []Altered Mental Status  []Visual/Hearing Impairment  []Unable to Read/Write  []Motivational   [x]No Barriers Identified  []Other:    Relationship Status:  []Single  [x]  []Significant Other/Life Partner  []  []  []      Living Circumstances:  []Lives Alone  [x]Family/Significant Other in Household  []Roommates  []Children in the Home  []Paid Caregivers  []Assisted Living Facility/Group Home  []Skilled [address]  []Homeless  []Incarcerated  []Environmental/Care Concerns  []other:    Employment Status:  [x]Employed Full-time []Employed Part-time []Homemaker [] Disabled  [] Retired []Other:    Support System:    []Strong  [x]Fair  []Limited    Financial/Legal Concerns:    []Uninsured  [x]Limited Income/Resources  []Non-Citizen  []No Concerns Identified  []Financial POA:    []Other:    [place]/Spiritual/Existential:  []Strong Sense of Spirituality  []Involved in [facility]  []Request  Visit  []Expressing Spiritual/Existential Angst  []No Concerns Identified    Coping with Illness:         Patient: Family/Caregiver:   Understanding and Acceptance of Illness/Prognosis  [x] [x]   Strong Sense of Resilience [x] []   Self Reflection [x] []   Engaged Support System [] []   Does not Readily Discuss Illness [] []   Denial of Terminal Status [] []   Anger [] []   Depression [] [] Anxiety/Fear [x] [x]   Bargaining [] []   Recent Diagnosis/Prognosis [x] [x]   Difficulties with Body Image [] []   Loss of Identity [] []   Excessive Substance Use [] []   Mental Health History [] []   Enmeshed Relationships [] []   History of Loss [] []   Anticipatory Grief [] []   Concern for Complicated Grief [] []   Suicidal Ideation or Plan [] []   Unable to assess [] []            Narrative:  Met with patient  and her  of 48 years in November, Jensen Nevarez,  to introduce social work navigator role and supports. 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 Couple has 1 daughter, Kaitlin Estes 2 grandchildren - TJ - early 25s and 15 yo and they live in [place]. Pt works M-F 8-4:30. Pt grandson is getting  at the end of July  . Assessment/Action:   1. Introduce self and role of the  in the [employer]. 2. Informed the patient of the [facility] and available resources there. er  3. Continue to meet with the patient when she returns to the clinic for ongoing assessment of the patient's adjustment to her diagnosis and treatment. 4. Ongoing psychosocial support as desired by patient. Plan/Referral:   Complementary therapies referral  Insurance/Entitlements referral    Financial/Medication assistance referral       Indigo Ferro.  SUZETTE Weinberg/EDUARDO  Supervisee in Social Work

## 2020-06-15 NOTE — PROGRESS NOTES
[address] [facility]  at [address] [place] [address]  [place], [address] [facility]  [phone]          Oncology Consultation Note        Patient: Johnna Campbell MRN: [id number]  SSN: [id number]    YOB: 1950  Age: 65 y.o. Sex: female      Subjective:      Johnna Campbell is a 65 y.o. female who I am seeing in consultation for a newly diagnosed lung mass with metastasis to the liver. She has been experiencing mild dyspnea for over 2 years. Occasional cough, no phlegm or hemoptysis. She has some pain and discomfort in the left chest wall and RUQ. The pain is not bad and she does not take medicines. She is also having insomnia. She works for  [employer] [address] [place] [address]. She was seen in the ED. CT showed a mass in the LLL invading into the mediastinum and hepatic metastasis. She denies headache or bone pains. Review of Systems:    Constitutional: negative  Eyes: negative  Ears, Nose, Mouth, Throat, and Face: negative  Respiratory: negative  Cardiovascular: negative  Gastrointestinal: negative  Genitourinary:negative  Integument/Breast: negative  Hematologic/Lymphatic: negative  Musculoskeletal:negative  Neurological: negative        Past Medical History:   Diagnosis Date    CAD (coronary artery disease)     Cancer ([facility] [address]     lung     Past Surgical History:   Procedure Laterality Date    HX HEART CATHETERIZATION      HX HYSTERECTOMY        Family History   Problem Relation Age of Onset    Heart Disease Father     Cancer Sister      Social History     Tobacco Use    Smoking status: Former Smoker     Last attempt to quit: 2017     Years since quitting: 3.4    Smokeless tobacco: Never Used    Tobacco comment: 1 per week as of 2017   Substance Use Topics    Alcohol use: Never     Frequency: Never      Prior to Admission medications    Medication Sig Start Date End Date Taking?  Authorizing Provider   clopidogrel (PLAVIX) 75 mg tab Take 1 Tab by mouth daily. 7/25/19   Sahra Mejia MD   diphenhydrAMINE (BENADRYL) 25 mg capsule Take 1 Cap by mouth every six (6) hours as needed for Itching. 7/24/19   Sahra Mejia MD   aspirin delayed-release 81 mg tablet Take 81 mg by mouth daily. Provider, Historical   metoprolol succinate (TOPROL-XL) 25 mg XL tablet Take 25 mg by mouth daily. Provider, Historical   cholecalciferol (VITAMIN D3) 1,000 unit cap Take  by mouth daily. Provider, Historical   calcium carbonate (TUMS) 200 mg calcium (500 mg) chew Take 1 Tab by mouth daily. Provider, Historical   tiotropium bromide (SPIRIVA RESPIMAT) 2.5 mcg/actuation inhaler Take 2 Puffs by inhalation daily. Provider, Historical              Allergies   Allergen Reactions    Codeine Itching           Objective:     Vitals:    06/15/20 1338   BP: 168/90   Pulse: 80   Temp: 97.7 °F (36.5 °C)   TempSrc: Temporal   SpO2: 96%   Weight: 121 lb 9.6 oz (55.2 kg)   Height: 5' 3\" (1.6 m)            Physical Exam:    GENERAL: alert, cooperative, no distress, appears stated age  EYE: conjunctivae/corneas clear. PERRL, EOM's intact  LYMPHATIC: Cervical, supraclavicular, and axillary nodes normal.   THROAT & NECK: normal and no erythema or exudates noted. LUNG: clear to auscultation bilaterally  HEART: regular rate and rhythm, S1, S2 normal, no murmur, click, rub or gallop  ABDOMEN: soft, non-tender. Bowel sounds normal. No masses,  no organomegaly  EXTREMITIES:  extremities normal, atraumatic, no cyanosis or edema  SKIN: Normal.  NEUROLOGIC: AOx3. Gait normal. Reflexes and motor strength normal and symmetric. Cranial nerves 2-12 and sensation grossly intact. CT Results (most recent):  Results from Hospital Encounter encounter on 06/10/20   CT CHEST W CONT    Narrative EXAM:  CT CHEST W CONT    INDICATION:  concern for lung cancer    COMPARISON: CT abdomen/pelvis 6/10/2020, chest radiograph 7/10/2019.     CONTRAST:  100 cc of Isovue-370. TECHNIQUE: Multislice helical CT was performed from the thoracic inlet to the  adrenal glands after the uneventful administration of intravenous contrast.  Contiguous 5 mm axial images were reconstructed and lung and soft tissue windows  were generated. Coronal and sagittal reformations were generated. CT dose  reduction was achieved through use of a standardized protocol tailored for this  examination and automatic exposure control for dose modulation. FINDINGS:  Lungs/Pleura: There is a confluent left lower lobe perihilar mass extending into  the left hilum and posterior mediastinum measuring approximately 5.6 x 4.6 x 4.4  cm (series 301 image 28 and series 604 image 80). There is encasement and severe  narrowing of the left lower lobe segmental pulmonary arteries. There is  encasement of the left lower lobe segmental bronchi, but with only mild  narrowing. There are some adjacent areas of fingerlike, tubular tumor extension  in the superior segment of the left lower lobe (series 301 image 26, image 29,  and image 27). There is severe emphysema. No additional suspicious pulmonary  nodules are identified. No pleural effusion. Axilla/Soft Tissue: No pathologic axillary adenopathy. Mediastinum: The heart is normal in size. No pericardial effusion. Coronary  artery stents noted. Enlarged left lower paratracheal lymph node measuring 12 mm  (series 301 image 22). Moderate calcific atherosclerosis of the abdominal aorta. Upper Abdomen: Please see dedicated CT abdomen/pelvis performed concurrently for  description of diffuse hepatic metastatic disease and upper abdominal gloria  metastases. Bones: No evidence of acute fracture, dislocation, or aggressive osseous  abnormality. Impression IMPRESSION:    1. Confluent left lower lobe perihilar mass extending into the left hilum and  posterior mediastinum measuring 5.6 x 4.6 x 4.4 cm, favored to represent primary  lung malignancy.  Areas of fingerlike, tubular extension of the tumor in the  superior segment of the left upper lobe, at least in part representing  endobronchial spread. Encasement and severe narrowing of the left lower lobe  segmental pulmonary arteries. Encasement of the left lower lobe segmental  bronchi, but with only mild narrowing. 2. Additional enlarged left lower paratracheal lymph node, favored to represent  gloria metastasis. 3. Please see dedicated CT abdomen/pelvis for description of diffuse hepatic  metastatic disease and upper abdominal gloria metastases. 4. Severe emphysema. 23X           I personally reviewed the images. LLL lung mass with invasion in the mediastinum. Numerous liver metastasis. Lab Results   Component Value Date/Time    WBC 11.6 (H) 06/10/2020 03:37 PM    HGB 15.2 06/10/2020 03:37 PM    HCT 43.9 06/10/2020 03:37 PM    PLATELET 453 42/15/2880 03:37 PM    MCV 90.3 06/10/2020 03:37 PM       Lab Results   Component Value Date/Time    Sodium 133 (L) 06/10/2020 03:37 PM    Potassium 4.1 06/10/2020 03:37 PM    Chloride 101 06/10/2020 03:37 PM    CO2 23 06/10/2020 03:37 PM    Anion gap 9 06/10/2020 03:37 PM    Glucose 86 06/10/2020 03:37 PM    BUN 16 06/10/2020 03:37 PM    Creatinine 0.75 06/10/2020 03:37 PM    BUN/Creatinine ratio 21 (H) 06/10/2020 03:37 PM    GFR est AA >60 06/10/2020 03:37 PM    GFR est non-AA >60 06/10/2020 03:37 PM    Calcium 9.4 06/10/2020 03:37 PM    Bilirubin, total 1.6 (H) 06/10/2020 03:37 PM    Alk. phosphatase 298 (H) 06/10/2020 03:37 PM    Protein, total 7.0 06/10/2020 03:37 PM    Albumin 3.2 (L) 06/10/2020 03:37 PM    Globulin 3.8 06/10/2020 03:37 PM    A-G Ratio 0.8 (L) 06/10/2020 03:37 PM    ALT (SGPT) 121 (H) 06/10/2020 03:37 PM           Assessment:     1. T4 N2 M1b (Stage IV) carcinoma of lung. ECOG PS 0  Intent of Treatment - palliative  Prognosis: poor    I spent 65 minute with the patient in a face-to-face encounter.  I explained her the stage of the disease, pathophysiology of the disease and the treatment approaches. I answered all her questions. More than 50% of the time was utilized in education, counseling and co-ordination of care. Complete staging with Brain MRI, Bone scan  Establish Dx with USG guided liver Bx    Biomarker testing on the tissue. Then we shall discuss the treatment. Pt has been screened for all eligibility/ineligibility criteria for Strata and has been determined eligible for this protocol. Informed consent was reviewed by RN with patient prior to signing. All questions were answered adequately by RN. Patient signed consent today for study strata. A copy of ICF given to patient. No additional questions at this time. Plan:       1. Brain MRI  2. Bone scan  3. USG guided liver Bx  4. Port-a-cath placement  5. Strata NGS  6. PD-L1 testing on the tissue - for keytruda and opdivo  7. Return in 2 weeks      Signed By: Sunny Bradley MD     Elizabeth 15, 2020        CC.  Corona Winters MD

## 2020-06-15 NOTE — LETTER
6/15/20 Patient: Merline Dec YOB: 1950 Date of Visit: 6/15/2020 Karson Powell [address] [id number] VIA Facsimile: [phone] Dear Karson Powell MD, Thank you for referring Ms. Jose Salazar to [address] [place] [address] for evaluation. My notes for this consultation are attached. If you have questions, please do not hesitate to call me. I look forward to following your patient along with you.  
 
 
Sincerely, 
 
Chayo Gutierrez MD

## 2020-06-19 NOTE — ROUTINE PROCESS
Have you been tested for COVID-19 in the past 7 days? No    Do you have a personal history of COVID-19 within the past 28 days? No  If Yes, What was the method of testing: clinical assumption or test result? N/A    Have you had close contact with a known to be positive COVID-19 patient within the past 14 days? No    Are you a healthcare worker or ? No  If Yes, have you been exposed to COVID-19 without proper PPE? No    Do you live in a SNF, adult home or other institutional setting? No  If Yes, have they experienced a flood of COVID-19 positive patients?     In the past 2-14 days have you had any of the following symptoms No   Cough    New onset Shortness of breath or difficulty breathing    Or at least two of these symptoms: No   Fever greater than 100 F   Chills   Repeated shaking with chills   Muscle pain   Headache   Sore throat   New loss of taste or smell   New onset diarrhea

## 2020-06-22 NOTE — H&P
Radiology History and Physical    Patient: Bindu Ro 65 y.o. female       Chief Complaint: No chief complaint on file. History of Present Illness: Port placement and liver bx    History:    Past Medical History:   Diagnosis Date    CAD (coronary artery disease)     Cancer ([facility] [address]     lung     Family History   Problem Relation Age of Onset    Heart Disease Father     Cancer Sister      Social History     Socioeconomic History    Marital status:      Spouse name: Not on file    Number of children: Not on file    Years of education: Not on file    Highest education level: Not on file   Occupational History    Not on file   Social Needs    Financial resource strain: Not on file    Food insecurity     Worry: Not on file     Inability: Not on file    Transportation needs     Medical: Not on file     Non-medical: Not on file   Tobacco Use    Smoking status: Former Smoker     Last attempt to quit: 2017     Years since quitting: 3.4    Smokeless tobacco: Never Used    Tobacco comment: 1 per week as of 2017   Substance and Sexual Activity    Alcohol use: Never     Frequency: Never    Drug use: Never    Sexual activity: Not on file   Lifestyle    Physical activity     Days per week: Not on file     Minutes per session: Not on file    Stress: Not on file   Relationships    Social connections     Talks on phone: Not on file     Gets together: Not on file     Attends Oriental orthodox service: Not on file     Active member of club or organization: Not on file     Attends meetings of clubs or organizations: Not on file     Relationship status: Not on file    Intimate partner violence     Fear of current or ex partner: Not on file     Emotionally abused: Not on file     Physically abused: Not on file     Forced sexual activity: Not on file   Other Topics Concern    Not on file   Social History Narrative    Not on file       Allergies:    Allergies   Allergen Reactions    Codeine Itching       Current Medications:  Current Outpatient Medications   Medication Sig    budesonide/formoterol fumarate (SYMBICORT IN) Take  by inhalation.  diphenhydrAMINE (BENADRYL) 25 mg capsule Take 1 Cap by mouth every six (6) hours as needed for Itching.  metoprolol succinate (TOPROL-XL) 25 mg XL tablet Take 25 mg by mouth daily.  cholecalciferol (VITAMIN D3) 1,000 unit cap Take  by mouth daily.  calcium carbonate (TUMS) 200 mg calcium (500 mg) chew Take 1 Tab by mouth daily.  clopidogrel (PLAVIX) 75 mg tab Take 1 Tab by mouth daily.  aspirin delayed-release 81 mg tablet Take 81 mg by mouth daily. Current Facility-Administered Medications   Medication Dose Route Frequency    fentaNYL citrate (PF) injection 200 mcg  200 mcg IntraVENous Multiple    0.9% sodium chloride infusion  25 mL/hr IntraVENous CONTINUOUS    heparin (porcine) pf 500 Units  500 Units InterCATHeter ONCE    heparinized saline 2 units/mL infusion 200 Units  200 Units Irrigation ONCE    lidocaine (XYLOCAINE) 20 mg/mL (2 %) injection 400 mg  20 mL SubCUTAneous ONCE    lidocaine-EPINEPHrine (XYLOCAINE) 1 %-1:100,000 injection 200 mg  20 mL SubCUTAneous ONCE    midazolam (PF) (VERSED) injection 5 mg  5 mg IntraVENous Multiple        Physical Exam:  Blood pressure (!) 155/94, pulse 88, temperature 98 °F (36.7 °C), resp. rate 18, height 5' 3\" (1.6 m), weight 54 kg (119 lb), SpO2 98 %, not currently breastfeeding. GENERAL: alert, cooperative, no distress, appears stated age  LUNG: clear to auscultation bilaterally  HEART: regular rate and rhythm  ABD: Non tender, non distended. Alerts:      Laboratory:    No results for input(s): HGB, HCT, WBC, PLT, INR, BUN, CREA, K, CRCLT, HGBEXT, HCTEXT, PLTEXT, INREXT in the last 72 hours. No lab exists for component: PTT, PT      Plan of Care/Planned Procedure:  Risks, benefits, and alternatives reviewed with patient and she agrees to proceed with the procedure.    Deemed appropriate or moderate sedation with versed and fentanyl.       Abdias Estevez MD

## 2020-06-22 NOTE — ROUTINE PROCESS
0725  Patient arrived ambulatory to Radiology Recovery, alert and oriented x 4,  waiting in vehicle. Angela Taylor at bedside evaluating patient and explaining procedure with risks and benefits. Patient verbalized understanding and signed consent for procedure. 1030  Patient alert and oriented x 4, denies discomfort to procedure site, dressing to procedure site dry and intact. Patient ate crackers and drank ginger ale without difficulty. Dr. Jonna Taylor advised patient can be discharged. 1045 Patient provided with verbal and written discharge instructions. Unable to contact patient's  to notify patient ready for discharge. 12  Patient's  answered call and advised he will arrive at the hospital at [phone]  Patient discharged via wheelchair with  to transport home.

## 2020-06-24 PROBLEM — C34.90 SMALL CELL LUNG CANCER (HCC): Status: ACTIVE | Noted: 2020-01-01

## 2020-06-24 NOTE — LETTER
6/30/20 Patient: Marbella Goldberg YOB: 1950 Date of Visit: 6/24/2020 [address] [phone] VIA Facsimile: [phone] Dear Anais Ulloa MD, Thank you for referring Ms. Shivam Constantino to [address] [place] [address] for evaluation. My notes for this consultation are attached. If you have questions, please do not hesitate to call me. I look forward to following your patient along with you.  
 
 
Sincerely, 
 
Yusuf Espana MD

## 2020-06-24 NOTE — PROGRESS NOTES
Jed Mcrae is a 65 y.o. female here for follow up for: Chief Complaint Patient presents with  Lung Cancer  
  met Here for follow up on scans. 1. Have you been to the ER, urgent care clinic since your last visit? Hospitalized since your last visit? No 
 
2. Have you seen or consulted any other health care providers outside of the [address] [place] [address] since your last visit? Include any pap smears or colon screening. PCP on Friday for check up. Pt here with . Port put in 6/22/20. Pain comes and goes. Does have a hard time sleeping.

## 2020-06-30 NOTE — PROGRESS NOTES
[address] [place] [address] DISCHARGE INSTRUCTIONS FOR:  CHEMOTHERAPY / BIOTHERAPY Chemotherapy has the potential to cause many side effects. The following are general precautions that chemo patients should take: 1. Practice good hand washing: * Use soap and water for at least 15 seconds, covering all areas of hands. * Always wash hands before eating. * Wash hands after contact with public surfaces such as door knobs and 
       handles, shopping carts, telephones and elevator buttons. 2. Get plenty of rest:   
* You will likely experience fatigue three to five days following your treatment. It may last as long as seven days. 3. Drink plenty of fluids. Water is best. 
 
4. Eat a well balanced diet: 
* Small frequent meals may help if you are having trouble with nausea or your  appetite. Some people also do well with nutritional supplements. 5. Pace yourself with daily activities: * Take frequent breaks and ask for help if you need it. 6. Exercise is very important: 
* It will increase circulation and will help the fatigue. Do what you can each day. 7. If your regimen results in hair loss: *  You will likely notice effects between two and three weeks following your first treatment. Some lose all hair while others only experience thinning. 8. Practice good oral hygiene: 
 *  Notify your M.D. immediately if any mouth sores or discomfort develop. 9. Protect yourself from the sun. Signs/Symptoms of an allergic reaction and/or some side effects may require immediate medical attention. Notify your physician if you develop one or more of the following:  
 
Temperature of 100.5 degrees or greater;  
Skin redness, itching, swelling, blistering, weeping, crusting, rash, or hives;   
Wheezing, chest tightness, cough, or shortness of breath;  
Swelling of the face, eyelids, lips, tongue, or throat; 
Severe, persistent headache; 
Stuffy nose, runny nose, sneezing;  
 Red (bloodshot), itchy, swollen, or watery eyes;  
Stomach  pain, nausea, vomiting, diarrhea, or bloody stools; 
Mouth sores Your physician should also be aware of the following symptoms: 
 
Persistent and unresolved nausea and/or vomiting;  
Persistent and unresolved diarrhea or constipation;  
Numbness/tingling/burning of the extremities, including the fingers and toes; Bleeding or unexplained bruising; Unexplained redness/swelling/pain in the arms or legs; Shortness of breath or fatigue that worsens;  
Pain with urination or blood in the urine; Chills; 
Cough, especially a productive cough; 
Mouth sores or a white coating of the tongue; Redness, swelling, pain or drainage at the port-a-cath or IV site; Increased feeling of bloating or water retention; Excessive weight loss or gain; 
Ringing in the ears; Difficulty swallowing; 
Dizziness, vertigo, lightheadedness or fainting. Chantale Robertson Signature: ____________________________ 6/30/2020 Tasha Caal

## 2020-06-30 NOTE — PROGRESS NOTES
[address] [facility] 
at [place] [address], [facility], [address] 
[phone] Oncology Follow Up Note Patient: Jona Carr MRN: [id number]  SSN: [id number] YOB: 1950  Age: 65 y.o. Sex: female Diagnosis 1. 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  
  
Jona Carr is a 65 y.o. 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 She has been experiencing mild dyspnea for over 2 years. Occasional cough, no phlegm or hemoptysis. She has some pain and discomfort in the left chest wall and RUQ. The pain is not bad and she does not take medicines. She is also having insomnia. She works for [employer] [address] [place] [address]. She was seen in the ED. CT showed a mass in the LLL invading into the mediastinum and hepatic metastasis. She denies headache or bone pains. CT guided bx established the diagnosis. Bone scan shows widespread bony metastasis. Review of Systems: 
 
Constitutional: negative Eyes: negative Ears, Nose, Mouth, Throat, and Face: negative Respiratory: negative Cardiovascular: negative Gastrointestinal: negative Genitourinary:negative Integument/Breast: negative Hematologic/Lymphatic: negative Musculoskeletal:negative Neurological: negative Past Medical History:  
Diagnosis Date  CAD (coronary artery disease)  Cancer ([facility] [address] lung Past Surgical History:  
Procedure Laterality Date  HX HEART CATHETERIZATION    
 HX HYSTERECTOMY  IR BX TRANSCATHETER  6/22/2020  IR INSERT TUNL CVC W PORT OVER 5 YEARS  6/22/2020 Family History Problem Relation Age of Onset  Heart Disease Father  Cancer Sister Social History Tobacco Use  Smoking status: Former Smoker Last attempt to quit: 2017 Years since quitting: 3.4  Smokeless tobacco: Never Used  Tobacco comment: 1 per week as of 2017 Substance Use Topics  Alcohol use: Never Frequency: Never Prior to Admission medications Medication Sig Start Date End Date Taking? Authorizing Provider  
lidocaine-prilocaine (EMLA) topical cream Apply  to affected area as needed for Pain. 6/24/20  Yes Jose Mercado MD  
ondansetron (ZOFRAN ODT) 4 mg disintegrating tablet Take 1 Tab by mouth every eight (8) hours as needed for Nausea or Vomiting. 6/24/20  Yes Jose Mercado MD  
prochlorperazine (COMPAZINE) 10 mg tablet Take 0.5 Tabs by mouth every six (6) hours as needed for Nausea. 6/24/20  Yes Jose Mercado MD  
budesonide/formoterol fumarate (SYMBICORT IN) Take  by inhalation. Yes Provider, Historical  
clopidogrel (PLAVIX) 75 mg tab Take 1 Tab by mouth daily. 7/25/19  Yes Emilia Wong MD  
diphenhydrAMINE (BENADRYL) 25 mg capsule Take 1 Cap by mouth every six (6) hours as needed for Itching. 7/24/19  Yes Emilia Wong MD  
aspirin delayed-release 81 mg tablet Take 81 mg by mouth daily. Yes Provider, Historical  
metoprolol succinate (TOPROL-XL) 25 mg XL tablet Take 25 mg by mouth daily. Yes Provider, Historical  
cholecalciferol (VITAMIN D3) 1,000 unit cap Take  by mouth daily. Yes Provider, Historical  
calcium carbonate (TUMS) 200 mg calcium (500 mg) chew Take 1 Tab by mouth daily. Yes Provider, Historical  
  
 
 
 
 
Allergies Allergen Reactions  Codeine Itching Objective:  
 
Vitals:  
 06/24/20 1503 BP: 156/74 Pulse: 87 Temp: 97.8 °F (36.6 °C) TempSrc: Temporal  
SpO2: 95% Weight: 122 lb (55.3 kg) Height: 5' 3\" (1.6 m) Physical Exam: 
 
GENERAL: alert, cooperative, no distress, appears stated age EYE: conjunctivae/corneas clear. PERRL, EOM's intact LYMPHATIC: Cervical, supraclavicular, and axillary nodes normal.  
 THROAT & NECK: normal and no erythema or exudates noted. LUNG: clear to auscultation bilaterally HEART: regular rate and rhythm, S1, S2 normal, no murmur, click, rub or gallop ABDOMEN: soft, non-tender. Bowel sounds normal. No masses,  no organomegaly EXTREMITIES:  extremities normal, atraumatic, no cyanosis or edema SKIN: Normal. 
NEUROLOGIC: AOx3. Gait normal. Reflexes and motor strength normal and symmetric. Cranial nerves 2-12 and sensation grossly intact. CT Results (most recent): 
Results from [facility] Encounter encounter on 06/10/20 CT CHEST W CONT Narrative EXAM:  CT CHEST W CONT INDICATION:  concern for lung cancer COMPARISON: CT abdomen/pelvis 6/10/2020, chest radiograph 7/10/2019. CONTRAST:  100 cc of Isovue-370. TECHNIQUE: Multislice helical CT was performed from the thoracic inlet to the 
adrenal glands after the uneventful administration of intravenous contrast. 
Contiguous 5 mm axial images were reconstructed and lung and soft tissue windows 
were generated. Coronal and sagittal reformations were generated. CT dose 
reduction was achieved through use of a standardized protocol tailored for this 
examination and automatic exposure control for dose modulation. FINDINGS: 
Lungs/Pleura: There is a confluent left lower lobe perihilar mass extending into 
the left hilum and posterior mediastinum measuring approximately 5.6 x 4.6 x 4.4 
cm (series 301 image 28 and series 604 image 80). There is encasement and severe 
narrowing of the left lower lobe segmental pulmonary arteries. There is 
encasement of the left lower lobe segmental bronchi, but with only mild 
narrowing. There are some adjacent areas of fingerlike, tubular tumor extension 
in the superior segment of the left lower lobe (series 301 image 26, image 29, 
and image 27). There is severe emphysema. No additional suspicious pulmonary 
nodules are identified. No pleural effusion. Axilla/Soft Tissue: No pathologic axillary adenopathy. Mediastinum: The heart is normal in size. No pericardial effusion. Coronary 
artery stents noted. Enlarged left lower paratracheal lymph node measuring 12 mm 
(series 301 image 22). Moderate calcific atherosclerosis of the abdominal aorta. Upper Abdomen: Please see dedicated CT abdomen/pelvis performed concurrently for 
description of diffuse hepatic metastatic disease and upper abdominal gloria 
metastases. Bones: No evidence of acute fracture, dislocation, or aggressive osseous 
abnormality. Impression IMPRESSION:   
1. Confluent left lower lobe perihilar mass extending into the left hilum and 
posterior mediastinum measuring 5.6 x 4.6 x 4.4 cm, favored to represent primary 
lung malignancy. Areas of fingerlike, tubular extension of the tumor in the 
superior segment of the left upper lobe, at least in part representing 
endobronchial spread. Encasement and severe narrowing of the left lower lobe 
segmental pulmonary arteries. Encasement of the left lower lobe segmental 
bronchi, but with only mild narrowing. 2. Additional enlarged left lower paratracheal lymph node, favored to represent 
gloria metastasis. 3. Please see dedicated CT abdomen/pelvis for description of diffuse hepatic 
metastatic disease and upper abdominal gloria metastases. 4. Severe emphysema. 23X I personally reviewed the images. LLL lung mass with invasion in the mediastinum. Numerous liver metastasis. Lab Results Component Value Date/Time WBC 14.5 (H) 06/30/2020 08:41 AM  
 HGB 13.7 06/30/2020 08:41 AM  
 HCT 40.2 06/30/2020 08:41 AM  
 PLATELET 931 (L) 54/15/6248 08:41 AM  
 MCV 91.6 06/30/2020 08:41 AM  
 
 
Lab Results Component Value Date/Time  Sodium 137 06/30/2020 08:41 AM  
 Potassium 3.9 06/30/2020 08:41 AM  
 Chloride 106 06/30/2020 08:41 AM  
 CO2 26 06/30/2020 08:41 AM  
 Anion gap 5 06/30/2020 08:41 AM  
 Glucose 96 06/30/2020 08:41 AM  
 BUN 21 (H) 06/30/2020 08:41 AM  
 Creatinine 0.93 06/30/2020 08:41 AM  
 BUN/Creatinine ratio 23 (H) 06/30/2020 08:41 AM  
 GFR est AA >60 06/30/2020 08:41 AM  
 GFR est non-AA 59 (L) 06/30/2020 08:41 AM  
 Calcium 8.9 06/30/2020 08:41 AM  
 Bilirubin, total 2.9 (H) 06/30/2020 08:41 AM  
 Alk. phosphatase 436 (H) 06/30/2020 08:41 AM  
 Protein, total 6.1 (L) 06/30/2020 08:41 AM  
 Albumin 2.5 (L) 06/30/2020 08:41 AM  
 Globulin 3.6 06/30/2020 08:41 AM  
 A-G Ratio 0.7 (L) 06/30/2020 08:41 AM  
 ALT (SGPT) 118 (H) 06/30/2020 08:41 AM  
 
 
 
 
Assessment: 1. 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 Cancers respond differently to treatment. Overall prognosis depends on many factors including other conditions, cancer stage, side effects, and other unforeseen events. Goal of therapy: Palliative Expected response to treatment:  Guarded: Anticipate cancer may grow or spread despite treatment Treatment benefits and harms:  We discussed potential short term side effects to include:GI upset, increased infection risk, anemia, alopecia and fatigue Long term side effects of treatment:  neuropathy Quality of life: Quality of life concerns have been addressed. Treatment as outlined is expected to have significant impact on patients quality of life. I spent 65 minute with the patient in a face-to-face encounter. I explained her the stage of the disease, pathophysiology of the disease and the treatment approaches. I answered all her questions. More than 50% of the time was utilized in education, counseling and co-ordination of care. I informed the patient that this is an incurable disease.    
 
The standard of care for Extensive stage small cell lung cancer is combination of platinum doublet with PD-L1 inhibitor (Atezolizumab or Durvalumab). In a recent Ph 3 RCT, 268 patients were allocated to the durvalumab plus platinum-etoposide group and 269 to the platinum-etoposide group. Durvalumab plus platinum-etoposide was associated with a significant improvement in overall survival, with a hazard ratio of 0·73 (95% CI 0·59-0·91; p=[id number]]); median overall survival was 13·0 months (95% CI 11·5-14·8) in the durvalumab plus platinum-etoposide group versus 10·3 months (9·3-11·2) in the platinum-etoposide group, with 34% (34·1-53·9) versus 25% (23·7-68·9) of patients alive at 18 months. Any-cause adverse events of grade 3 or 4 occurred in 163 (62%) of 265 treated patients in the durvalumab plus platinum-etoposide group and 166 (62%) of 266 in the platinum-etoposide group; adverse events leading to death occurred in 13 (5%) and 15 (6%) patients. I counseled the patient regarding the chemotherapy. Discussions included side-effect, toxicity, benefit and risks of chemotherapy. She understood the expected side-effect which includes alopecia, nausea, peripheral neuropathy, neutropenic fever, anemia, need for transfusion among other things. After weighing the benefit and risks, she agreed to proceed with chemotherapy. I counseled the patient regarding the immunotherapy. Discussions included side-effect, toxicity, benefit and risks of chemotherapy. She understood the expected side-effect which includes fatigue and various immune related side effects such as colitis, pneumonitis, hypophysitis, arthritis among other things. After weighing the benefit and risks, she agreed to proceed with immunotherapy. She understands that the alternative to this treatment is observation alone. The patient's emotional well being was addressed during this office visit and patient seems to be coping well with the diagnosis and the treatment. 2. Bone metastasis Start Denosumab. Plan: 1. Start palliative chemotherapy - carboplatin/etoposide/durvalumab 2. Return in 2 weeks 3. Refer to Palliative care Signed By: Sunny Bradley MD   
 June 30, 2020   
 
 
CC. Corona Winters MD 
CC.  Emperatriz Anderson MD

## 2020-06-30 NOTE — PROGRESS NOTES
[facility]fusion [facility] - Chemotherapy Progress Note 0820 - Pt admit to [facility] for C1D1 Imfinzi/Carbo/Etoposide in stable condition. Assessment completed, pt reports no new concerns. R chest PAC accessed with 0.75\" Angeles Saas with positive blood return. Labs drawn per order and sent. Line flushed, clamped, Curos Cap applied to end clave. Chemotherapy Flowsheet 6/30/2020 Cycle C1D1 Date 6/30/2020 Drug / Regimen Imfinzi/Carbo/Etoposide Pre Meds given Notes given Visit Vitals /83 Pulse 81 Temp 97.4 °F (36.3 °C) Ht 5' 3\" (1.6 m) Wt 56.4 kg (124 lb 4.8 oz) SpO2 93% Breastfeeding No  
BMI 22.02 kg/m² Recent Results (from the past 12 hour(s)) METABOLIC PANEL, COMPREHENSIVE Collection Time: 06/30/20  8:41 AM  
Result Value Ref Range Sodium 137 136 - 145 mmol/L Potassium 3.9 3.5 - 5.1 mmol/L Chloride 106 97 - 108 mmol/L  
 CO2 26 21 - 32 mmol/L Anion gap 5 5 - 15 mmol/L Glucose 96 65 - 100 mg/dL BUN 21 (H) 6 - 20 MG/DL Creatinine 0.93 0.55 - 1.02 MG/DL  
 BUN/Creatinine ratio 23 (H) 12 - 20 GFR est AA >60 >60 ml/min/1.73m2 GFR est non-AA 59 (L) >60 ml/min/1.73m2 Calcium 8.9 8.5 - 10.1 MG/DL Bilirubin, total 2.9 (H) 0.2 - 1.0 MG/DL  
 ALT (SGPT) 118 (H) 12 - 78 U/L  
 AST (SGOT) 170 (H) 15 - 37 U/L Alk. phosphatase 436 (H) 45 - 117 U/L Protein, total 6.1 (L) 6.4 - 8.2 g/dL Albumin 2.5 (L) 3.5 - 5.0 g/dL Globulin 3.6 2.0 - 4.0 g/dL A-G Ratio 0.7 (L) 1.1 - 2.2    
CBC WITH AUTOMATED DIFF Collection Time: 06/30/20  8:41 AM  
Result Value Ref Range WBC 14.5 (H) 3.6 - 11.0 K/uL  
 RBC 4.39 3.80 - 5.20 M/uL  
 HGB 13.7 11.5 - 16.0 g/dL HCT 40.2 35.0 - 47.0 % MCV 91.6 80.0 - 99.0 FL  
 MCH 31.2 26.0 - 34.0 PG  
 MCHC 34.1 30.0 - 36.5 g/dL  
 RDW 16.6 (H) 11.5 - 14.5 % PLATELET 691 (L) 417 - 400 K/uL MPV 10.7 8.9 - 12.9 FL  
 NRBC 0.2 (H) 0  WBC ABSOLUTE NRBC 0.03 (H) 0.00 - 0.01 K/uL NEUTROPHILS 92 (H) 32 - 75 % BAND NEUTROPHILS 1 % LYMPHOCYTES 4 (L) 12 - 49 % MONOCYTES 3 (L) 5 - 13 % EOSINOPHILS 0 0 - 7 % BASOPHILS 0 0 - 1 % IMMATURE GRANULOCYTES 0 0.0 - 0.5 % ABS. NEUTROPHILS 13.5 (H) 1.8 - 8.0 K/UL  
 ABS. LYMPHOCYTES 0.6 (L) 0.8 - 3.5 K/UL  
 ABS. MONOCYTES 0.4 0.0 - 1.0 K/UL  
 ABS. EOSINOPHILS 0.0 0.0 - 0.4 K/UL  
 ABS. BASOPHILS 0.0 0.0 - 0.1 K/UL  
 ABS. IMM. GRANS. 0.0 0.00 - 0.04 K/UL  
 DF MANUAL    
 RBC COMMENTS ANISOCYTOSIS 
1+ TSH 3RD GENERATION Collection Time: 06/30/20  8:41 AM  
Result Value Ref Range TSH 1.13 0.36 - 3.74 uIU/mL  
  
1000 - Results outside of treatment parameters called to [facility]. To proceed with treatment. Medications: 
Medications Administered 0.9% sodium chloride infusion Admin Date 
06/30/2020 Action New Bag Dose 25 mL/hr Rate 25 mL/hr Route IntraVENous Administered By Yoni Lennon CARBOplatin (PARAPLATIN) 369 mg in 0.9% sodium chloride 250 mL, overfill volume 25 mL chemo infusion Admin Date 
06/30/2020 Action New Bag Dose 
369 mg Rate 623.8 mL/hr Route IntraVENous Administered By Yoni Lennon  
  
  
 dexamethasone (DECADRON) 12 mg in 0.9% sodium chloride 50 mL IVPB Admin Date 
06/30/2020 Action Given Dose 
12 mg Route IntraVENous Administered By Yoni Lennon  
  
  
 durvalumab (IMFINZI) 1,500 mg in 0.9% sodium chloride 100 mL, overfill volume 10 mL IVPB Admin Date 
06/30/2020 Action New Bag Dose 
1500 mg Rate 140 mL/hr Route IntraVENous Administered By Yoni Lennon  
  
  
 etoposide (VEPESID) 157 mg in 0.9% sodium chloride 500 mL, overfill volume 50 mL chemo infusion Admin Date 
06/30/2020 Action New Bag Dose 
157 mg Rate 
557.9 mL/hr Route IntraVENous Administered By Yoni Lennon  
  
  
 fosaprepitant (EMEND) 150 mg in 0.9% sodium chloride 150 mL IVPB Admin Date 
06/30/2020 Action Given Dose 
150 mg Rate 450 mL/hr Route IntraVENous Administered By 
 Marion Corbett  
  
  
 heparin (porcine) pf 300-500 Units Admin Date 
06/30/2020 Action Given Dose 
500 Units Route InterCATHeter Administered By Marion Corbett  
  
  
 palonosetron HCl (ALOXI) injection 0.25 mg   
 Admin Date 
06/30/2020 Action Given Dose 0.25 mg Route IntraVENous Administered By Marion Corbett  
  
  
 saline peripheral flush soln 10 mL Admin Date 
06/30/2020 Action Given Dose 30 mL Route InterCATHeter Administered By Toshia Bynum Pt tolerated treatment well. Port maintained positive blood return throughout treatment, flushed with positive blood return at conclusion, and kept accessed. 1600 - D/c home in no distress. Pt aware of next [facility] appointment scheduled for: 7/1/20 Future Appointments Date Time Provider Abbie Rosario 7/1/2020  8:00 AM [facility] FT CHAIR 2 [facility]  
7/1/2020  9:00 AM Missy Tafoya MD [facility]  
[facility]2020  [facility]  
7/20/2020  8:00 AM Seaman FT CHAIR 2 [facility]  
7/21/2020  [facility]  
7/22/2020  8:00 AM [facility] CHAIR 2 [facility]

## 2020-07-01 NOTE — PROGRESS NOTES
Consuelo Haider is a 65 y.o. female here for follow up for:  Chief Complaint   Patient presents with    Lung Cancer     met    Chemotherapy   Cycle 1 Day 2 Etoposide,Carboplatin,Durvalumab    1. Have you been to the ER, urgent care clinic since your last visit? Hospitalized since your last visit? no    2. Have you seen or consulted any other health care providers outside of the [address] [place] [address] since your last visit? Include any pap smears or colon screening. no    Pain is better Pain is under ribs mostly on the left but some on right. . Does not get it as much. Feet and ankles are swelling.

## 2020-07-01 NOTE — LETTER
7/1/20 Patient: Sharron Staley YOB: 1950 Date of Visit: 7/1/2020 [address] [id number] VIA Facsimile: [phone] Dear Buffy Morel MD, Thank you for referring Ms. Leonel Mcgrath to [address] [place] [address] for evaluation. My notes for this consultation are attached. If you have questions, please do not hesitate to call me. I look forward to following your patient along with you.  
 
 
Sincerely, 
 
Khloe Silva MD

## 2020-07-01 NOTE — PROGRESS NOTES
[address] [facility]  [address], [address] [facility] [address], [address] [facility]  [phone]      Oncology Follow Up Note        Patient: Penny Jerry MRN: [id number]  SSN: [id number]    YOB: 1950  Age: 65 y.o. Sex: female        Diagnosis     1. 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 Palliative chemotherapy with    Carboplatin/etoposide/durvalumab, Cycle 1 Day 1    Subjective:      Penny Jerry is a 65 y.o. 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 She has been experiencing mild dyspnea for over 2 years. Occasional cough, no phlegm or hemoptysis. She has some pain and discomfort in the left chest wall and RUQ. The pain is not bad and she does not take medicines. She is also having insomnia. She works for [employer] [address] [place] [address]. She was seen in the ED. CT showed a mass in the LLL invading into the mediastinum and hepatic metastasis. She denies headache or bone pains. CT guided bx established the diagnosis. Bone scan shows widespread bony metastasis. Ms. Mery Chester is receiving palliative chemotherapy. Bone pain is slightly better. She does not have any new complaints. She is accompanied by her .        Review of Systems:    Constitutional: negative  Eyes: negative  Ears, Nose, Mouth, Throat, and Face: negative  Respiratory: negative  Cardiovascular: negative  Gastrointestinal: negative  Genitourinary:negative  Integument/Breast: negative  Hematologic/Lymphatic: negative  Musculoskeletal:negative  Neurological: negative        Past Medical History:   Diagnosis Date    CAD (coronary artery disease)     Cancer ([address]     lung     Past Surgical History:   Procedure Laterality Date    HX HEART CATHETERIZATION      HX HYSTERECTOMY      IR BX TRANSCATHETER  6/22/2020    IR INSERT RD CVC W PORT OVER 5 YEARS  6/22/2020      Family History   Problem Relation Age of Onset    Heart Disease Father     Cancer Sister      Social History     Tobacco Use    Smoking status: Former Smoker     Last attempt to quit: 2017     Years since quitting: 3.4    Smokeless tobacco: Never Used    Tobacco comment: 1 per week as of 2017   Substance Use Topics    Alcohol use: Never     Frequency: Never      Prior to Admission medications    Medication Sig Start Date End Date Taking? Authorizing Provider   lidocaine-prilocaine (EMLA) topical cream Apply  to affected area as needed for Pain. 6/24/20  Yes Umm Xiong MD   ondansetron (ZOFRAN ODT) 4 mg disintegrating tablet Take 1 Tab by mouth every eight (8) hours as needed for Nausea or Vomiting. 6/24/20  Yes mUm Xiong MD   prochlorperazine (COMPAZINE) 10 mg tablet Take 0.5 Tabs by mouth every six (6) hours as needed for Nausea. 6/24/20  Yes Umm Xiong MD   budesonide/formoterol fumarate (SYMBICORT IN) Take  by inhalation. Yes Provider, Historical   clopidogrel (PLAVIX) 75 mg tab Take 1 Tab by mouth daily. 7/25/19  Yes Kirill Kirkland MD   diphenhydrAMINE (BENADRYL) 25 mg capsule Take 1 Cap by mouth every six (6) hours as needed for Itching. 7/24/19  Yes Kirill Kirkland MD   aspirin delayed-release 81 mg tablet Take 81 mg by mouth daily. Yes Provider, Historical   metoprolol succinate (TOPROL-XL) 25 mg XL tablet Take 25 mg by mouth daily. Yes Provider, Historical   cholecalciferol (VITAMIN D3) 1,000 unit cap Take  by mouth daily. Yes Provider, Historical   calcium carbonate (TUMS) 200 mg calcium (500 mg) chew Take 1 Tab by mouth daily.    Yes Provider, Historical              Allergies   Allergen Reactions    Codeine Itching           Objective:     Vitals:    07/01/20 0832   BP: 157/87   Pulse: 77   Resp: 18   Temp: 97.7 °F (36.5 °C)   Weight: 126 lb (57.2 kg)   Height: 5' 3\" (1.6 m)      Pain Scale: 0 - No pain/10         Physical Exam:    GENERAL: alert, cooperative, no distress, appears stated age  EYE: conjunctivae/corneas clear. PERRL, EOM's intact  LYMPHATIC: Cervical, supraclavicular, and axillary nodes normal.   THROAT & NECK: normal and no erythema or exudates noted. LUNG: clear to auscultation bilaterally  HEART: regular rate and rhythm, S1, S2 normal, no murmur, click, rub or gallop  ABDOMEN: soft, non-tender. Bowel sounds normal. No masses,  no organomegaly  EXTREMITIES:  extremities normal, atraumatic, no cyanosis or edema  SKIN: Normal.  NEUROLOGIC: AOx3. Gait normal. Reflexes and motor strength normal and symmetric. Cranial nerves 2-12 and sensation grossly intact. CT Results (most recent):  Results from Hospital Encounter encounter on 06/10/20   CT CHEST W CONT    Narrative EXAM:  CT CHEST W CONT    INDICATION:  concern for lung cancer    COMPARISON: CT abdomen/pelvis 6/10/2020, chest radiograph 7/10/2019. CONTRAST:  100 cc of Isovue-370. TECHNIQUE: Multislice helical CT was performed from the thoracic inlet to the  adrenal glands after the uneventful administration of intravenous contrast.  Contiguous 5 mm axial images were reconstructed and lung and soft tissue windows  were generated. Coronal and sagittal reformations were generated. CT dose  reduction was achieved through use of a standardized protocol tailored for this  examination and automatic exposure control for dose modulation. FINDINGS:  Lungs/Pleura: There is a confluent left lower lobe perihilar mass extending into  the left hilum and posterior mediastinum measuring approximately 5.6 x 4.6 x 4.4  cm (series 301 image 28 and series 604 image 80). There is encasement and severe  narrowing of the left lower lobe segmental pulmonary arteries. There is  encasement of the left lower lobe segmental bronchi, but with only mild  narrowing.  There are some adjacent areas of fingerlike, tubular tumor extension  in the superior segment of the left lower lobe (series 301 image 26, image 29,  and image 27). There is severe emphysema. No additional suspicious pulmonary  nodules are identified. No pleural effusion. Axilla/Soft Tissue: No pathologic axillary adenopathy. Mediastinum: The heart is normal in size. No pericardial effusion. Coronary  artery stents noted. Enlarged left lower paratracheal lymph node measuring 12 mm  (series 301 image 22). Moderate calcific atherosclerosis of the abdominal aorta. Upper Abdomen: Please see dedicated CT abdomen/pelvis performed concurrently for  description of diffuse hepatic metastatic disease and upper abdominal gloria  metastases. Bones: No evidence of acute fracture, dislocation, or aggressive osseous  abnormality. Impression IMPRESSION:    1. Confluent left lower lobe perihilar mass extending into the left hilum and  posterior mediastinum measuring 5.6 x 4.6 x 4.4 cm, favored to represent primary  lung malignancy. Areas of fingerlike, tubular extension of the tumor in the  superior segment of the left upper lobe, at least in part representing  endobronchial spread. Encasement and severe narrowing of the left lower lobe  segmental pulmonary arteries. Encasement of the left lower lobe segmental  bronchi, but with only mild narrowing. 2. Additional enlarged left lower paratracheal lymph node, favored to represent  gloria metastasis. 3. Please see dedicated CT abdomen/pelvis for description of diffuse hepatic  metastatic disease and upper abdominal gloria metastases. 4. Severe emphysema. 23X           I personally reviewed the images. LLL lung mass with invasion in the mediastinum. Numerous liver metastasis.        Lab Results   Component Value Date/Time    WBC 14.5 (H) 06/30/2020 08:41 AM    HGB 13.7 06/30/2020 08:41 AM    HCT 40.2 06/30/2020 08:41 AM    PLATELET 355 (L) 96/17/0028 08:41 AM    MCV 91.6 06/30/2020 08:41 AM       Lab Results   Component Value Date/Time    Sodium 137 06/30/2020 08:41 AM    Potassium 3.9 06/30/2020 08:41 AM    Chloride 106 06/30/2020 08:41 AM    CO2 26 06/30/2020 08:41 AM    Anion gap 5 06/30/2020 08:41 AM    Glucose 96 06/30/2020 08:41 AM    BUN 21 (H) 06/30/2020 08:41 AM    Creatinine 0.93 06/30/2020 08:41 AM    BUN/Creatinine ratio 23 (H) 06/30/2020 08:41 AM    GFR est AA >60 06/30/2020 08:41 AM    GFR est non-AA 59 (L) 06/30/2020 08:41 AM    Calcium 8.9 06/30/2020 08:41 AM    Bilirubin, total 2.9 (H) 06/30/2020 08:41 AM    Alk. phosphatase 436 (H) 06/30/2020 08:41 AM    Protein, total 6.1 (L) 06/30/2020 08:41 AM    Albumin 2.5 (L) 06/30/2020 08:41 AM    Globulin 3.6 06/30/2020 08:41 AM    A-G Ratio 0.7 (L) 06/30/2020 08:41 AM    ALT (SGPT) 118 (H) 06/30/2020 08:41 AM       CT Results (most recent):  Results from [facility] on 06/10/20   CT CHEST W CONT    Narrative EXAM:  CT CHEST W CONT    INDICATION:  concern for lung cancer    COMPARISON: CT abdomen/pelvis 6/10/2020, chest radiograph 7/10/2019. CONTRAST:  100 cc of Isovue-370. TECHNIQUE: Multislice helical CT was performed from the thoracic inlet to the  adrenal glands after the uneventful administration of intravenous contrast.  Contiguous 5 mm axial images were reconstructed and lung and soft tissue windows  were generated. Coronal and sagittal reformations were generated. CT dose  reduction was achieved through use of a standardized protocol tailored for this  examination and automatic exposure control for dose modulation. FINDINGS:  Lungs/Pleura: There is a confluent left lower lobe perihilar mass extending into  the left hilum and posterior mediastinum measuring approximately 5.6 x 4.6 x 4.4  cm (series 301 image 28 and series 604 image 80). There is encasement and severe  narrowing of the left lower lobe segmental pulmonary arteries. There is  encasement of the left lower lobe segmental bronchi, but with only mild  narrowing.  There are some adjacent areas of fingerlike, tubular tumor extension  in the superior segment of the left lower lobe (series 301 image 26, image 29,  and image 27). There is severe emphysema. No additional suspicious pulmonary  nodules are identified. No pleural effusion. Axilla/Soft Tissue: No pathologic axillary adenopathy. Mediastinum: The heart is normal in size. No pericardial effusion. Coronary  artery stents noted. Enlarged left lower paratracheal lymph node measuring 12 mm  (series 301 image 22). Moderate calcific atherosclerosis of the abdominal aorta. Upper Abdomen: Please see dedicated CT abdomen/pelvis performed concurrently for  description of diffuse hepatic metastatic disease and upper abdominal gloria  metastases. Bones: No evidence of acute fracture, dislocation, or aggressive osseous  abnormality. Impression IMPRESSION:    1. Confluent left lower lobe perihilar mass extending into the left hilum and  posterior mediastinum measuring 5.6 x 4.6 x 4.4 cm, favored to represent primary  lung malignancy. Areas of fingerlike, tubular extension of the tumor in the  superior segment of the left upper lobe, at least in part representing  endobronchial spread. Encasement and severe narrowing of the left lower lobe  segmental pulmonary arteries. Encasement of the left lower lobe segmental  bronchi, but with only mild narrowing. 2. Additional enlarged left lower paratracheal lymph node, favored to represent  gloria metastasis. 3. Please see dedicated CT abdomen/pelvis for description of diffuse hepatic  metastatic disease and upper abdominal gloria metastases. 4. Severe emphysema. 23X               Assessment:     1. 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  She vocalized understanding. Blood counts are acceptable. Results reviewed with the patient. 2. Bone metastasis    Start Denosumab on Cycle 2 of treatment      Plan:       1. Continue palliative chemotherapy - carboplatin/etoposide/durvalumab  2. Return in 3 weeks  3. Refer to Palliative care   4. Denosumab to start next cycle      Signed By: Marvel Lombardo MD     July 1, 2020        CC. Pierre Humphreys MD  CC.  Yudy Douglas MD

## 2020-07-01 NOTE — PROGRESS NOTES
[address] [facility] ([place], [place]) Visit Note    0800 Pt arrived at [facility] ambulatory and in no distress for C1D2. Assessment completed, no new complaints voiced. Port access intact with positive blood return. Patient Vitals for the past 12 hrs:   Temp Pulse Resp BP   07/01/20 1019 -- 63 -- 155/84   07/01/20 0804 97.7 °F (36.5 °C) 77 18 157/87       Medications received:  Medications Administered     0.9% sodium chloride infusion     Admin Date  07/01/2020 Action  New Bag Dose  25 mL/hr Rate  25 mL/hr Route  IntraVENous Administered By  Kirk Eisenmenger, RN          dexamethasone (DECADRON) 4 mg/mL injection 8 mg     Admin Date  07/01/2020 Action  Given Dose  8 mg Route  IntraVENous Administered By  Kirk Eisenmenger, RN          etoposide (VEPESID) 157 mg in 0.9% sodium chloride 500 mL, overfill volume 50 mL chemo infusion     Admin Date  07/01/2020 Action  New Bag Dose  157 mg Rate  557.9 mL/hr Route  IntraVENous Administered By  Kirk Eisenmenger, RN          heparin (porcine) pf 300-500 Units     Admin Date  07/01/2020 Action  Given Dose  500 Units Route  InterCATHeter Administered By  Kirk Eisenmenger, RN          saline peripheral flush soln 10 mL     Admin Date  07/01/2020 Action  Given Dose  10 mL Route  InterCATHeter Administered By  Kirk Eisenmenger, RN           Admin Date  07/01/2020 Action  Given Dose  10 mL Route  InterCATHeter Administered By  Kirk Eisenmenger, RN                1020 Tolerated treatment well, no adverse reaction noted. D/Cd from [facility] ambulatory and in no distress accompanied by self. Next appt 7/2.

## 2020-07-02 NOTE — PROGRESS NOTES
800- Pt arrived to [facility] ambulatory in no acute distress for C1D3 Etoposide. Assessment unchanged. R chest port remains accessed and positive blood return noted.     The following medications administered:  NS @ KVO  Decadron 8 mg IVP  Etoposide 157 mg IV over 60 mins  Neulasta 6 mg SQ on body applied to right arm    Patient Vitals for the past 12 hrs:   Temp Pulse Resp BP SpO2   07/02/20 0957 97.8 °F (36.6 °C) 63 16 155/85 --   07/02/20 0804 97.9 °F (36.6 °C) 69 16 143/74 95 %     1010- Pt tolerated treatment well, no adverse reactions noted.  Port flushed per policy and de-accessed, 2x2 and tape placed. Pt discharged ambulatory in no acute distress, accompanied by self. Next appointment 7/20/20. Education provided to patient about Neulasta On Body Injector including: side effects, how/when to remove the device, as well as what to do in the event of device malfunction. Opportunity for questions was provided and all questions were answered. Patient verbalized understanding.

## 2020-07-20 NOTE — PROGRESS NOTES
[facility] VISIT NOTE    0805  Pt arrived at [facility] ambulatory and in no distress for C2D1 Imfinzi/Carbo/Etoposide. Assessment completed, pt c/o weight loss due to generalized fluid retention improving; she weighed 127.5 lbs on 7/2/20 and 111.5 lbs today. Bilateral lower extremities are +2 edema. SHe says she's eating and drinking more than she used to; appetite is good. She had a nose bleed 2 weeks ago and went to her ENT on 7/9/20 who cauterized it and she hasn't had any issues since then. Blood pressure 143/80, pulse 97, temperature 97.8 °F (36.6 °C), resp. rate 16, height 5' 3\" (1.6 m), weight 50.6 kg (111 lb 8 oz). Right chest port accessed with 0.75 in avalos no difficulty. Positive blood return noted and labs drawn. Lab results are within treatment parameters. Corrected calcium = 9.4    Recent Results (from the past 12 hour(s))   CBC WITH AUTOMATED DIFF    Collection Time: 07/20/20  8:19 AM   Result Value Ref Range    WBC 4.3 3.6 - 11.0 K/uL    RBC 2.20 (L) 3.80 - 5.20 M/uL    HGB 8.4 (L) 11.5 - 16.0 g/dL    HCT 22.9 (L) 35.0 - 47.0 %    .1 (H) 80.0 - 99.0 FL    MCH 38.2 (H) 26.0 - 34.0 PG    MCHC 36.7 (H) 30.0 - 36.5 g/dL    RDW 21.3 (H) 11.5 - 14.5 %    PLATELET 273 400 - 780 K/uL    MPV 9.5 8.9 - 12.9 FL    NRBC 0.5 (H) 0  WBC    ABSOLUTE NRBC 0.02 (H) 0.00 - 0.01 K/uL    NEUTROPHILS 75 32 - 75 %    LYMPHOCYTES 11 (L) 12 - 49 %    MONOCYTES 10 5 - 13 %    EOSINOPHILS 0 0 - 7 %    BASOPHILS 1 0 - 1 %    IMMATURE GRANULOCYTES 3 (H) 0.0 - 0.5 %    ABS. NEUTROPHILS 3.3 1.8 - 8.0 K/UL    ABS. LYMPHOCYTES 0.5 (L) 0.8 - 3.5 K/UL    ABS. MONOCYTES 0.4 0.0 - 1.0 K/UL    ABS. EOSINOPHILS 0.0 0.0 - 0.4 K/UL    ABS. BASOPHILS 0.0 0.0 - 0.1 K/UL    ABS. IMM.  GRANS. 0.1 (H) 0.00 - 0.04 K/UL    DF SMEAR SCANNED      RBC COMMENTS ANISOCYTOSIS  2+        RBC COMMENTS MACROCYTOSIS  1+       METABOLIC PANEL, COMPREHENSIVE    Collection Time: 07/20/20  8:19 AM   Result Value Ref Range    Sodium 141 136 - 145 mmol/L    Potassium 3.5 3.5 - 5.1 mmol/L    Chloride 109 (H) 97 - 108 mmol/L    CO2 26 21 - 32 mmol/L    Anion gap 6 5 - 15 mmol/L    Glucose 91 65 - 100 mg/dL    BUN 9 6 - 20 MG/DL    Creatinine 0.71 0.55 - 1.02 MG/DL    BUN/Creatinine ratio 13 12 - 20      GFR est AA >60 >60 ml/min/1.73m2    GFR est non-AA >60 >60 ml/min/1.73m2    Calcium 8.3 (L) 8.5 - 10.1 MG/DL    Bilirubin, total 0.8 0.2 - 1.0 MG/DL    ALT (SGPT) 28 12 - 78 U/L    AST (SGOT) 25 15 - 37 U/L    Alk. phosphatase 504 (H) 45 - 117 U/L    Protein, total 6.2 (L) 6.4 - 8.2 g/dL    Albumin 2.4 (L) 3.5 - 5.0 g/dL    Globulin 3.8 2.0 - 4.0 g/dL    A-G Ratio 0.6 (L) 1.1 - 2.2       Medications received:  NS IV @ kvo  Aloxi IVP  Dexamethasone IV  Emend IV  Durvalumab IV  Carboplatin IV (keep same dose as last cycle per Mushtaq Haque NP)  Etoposide IV    Blood pressure 122/77, pulse 77, temperature 97.8 °F (36.6 °C), resp. rate 16, height 5' 3\" (1.6 m), weight 50.6 kg (111 lb 8 oz). Tolerated treatment well, no adverse reaction noted. Port flushed and secured for treatment tomorrow. Positive blood return noted. 1355  D/C'd from [facility] ambulatory and in no distress.  Next appointment is 7/21/20 at 8am.

## 2020-07-21 NOTE — PROGRESS NOTES
[facility] VISIT NOTE    4118  Pt arrived at [facility] ambulatory and in no distress for C2D2 Etoposide. Assessment completed, pt says her bilateral ankle and feet swelling is slightly better. Blood pressure 125/71, pulse 86, temperature 98.1 °F (36.7 °C), resp. rate 16, height 5' 3\" (1.6 m), weight 50.9 kg (112 lb 3.2 oz), not currently breastfeeding. Right chest port remains accessed from yesterday. Flushes easily with positive blood return. Medications received:  NS IV @ kvo  Dexamethasone IVP  Etoposide IV    Blood pressure 119/71, pulse 70, temperature 98.1 °F (36.7 °C), resp. rate 18, height 5' 3\" (1.6 m), weight 50.9 kg (112 lb 3.2 oz), not currently breastfeeding. Tolerated treatment well, no adverse reaction noted. Port flushed and secured for treatment tomorrow. Positive blood return noted. 1040  D/C'd from [facility] ambulatory and in no distress.  Next appointment is 7/22/20 at 8am.

## 2020-07-22 NOTE — LETTER
7/22/20 Patient: Katrina Maravilla YOB: 1950 Date of Visit: 7/22/2020 [address] [id number] VIA Facsimile: [phone] Dear Chris Alvarado MD, Thank you for referring Ms. Tashia Olivo to [address] [place] [address] for evaluation. My notes for this consultation are attached. If you have questions, please do not hesitate to call me. I look forward to following your patient along with you.  
 
 
Sincerely, 
 
Kacie Andrew NP

## 2020-07-22 NOTE — PROGRESS NOTES
Pt arrived to [facility] ambulatory for Etoposide C2D3 + Xgeva in no acute distress at 0805.  Assessment unremarkable except lower extremity 1+ edema bilaterally. R chest port with avalos intact, flushes without issue and positive blood return noted.  Labs obtained on 7/20. Pt to [facility] office for follow-up appt. Visit Vitals  /69 (BP 1 Location: Left arm, BP Patient Position: Sitting)   Pulse 84   Temp 97.1 °F (36.2 °C)   Resp 18   Ht 5' 3\" (1.6 m)   Wt 52.7 kg (116 lb 1.6 oz)   SpO2 97%   BMI 20.57 kg/m²       The following medications administered:  NS @ KVO  Decadron 8 mg IVP  Etoposide 157 mg IV over 1 hour  Xgeva 120 mg SQ in left arm   Neulasta 6 mg OBI applied to right arm    Visit Vitals  /73 (BP 1 Location: Left arm, BP Patient Position: Sitting)   Pulse 67   Temp 97.1 °F (36.2 °C)   Resp 18   Ht 5' 3\" (1.6 m)   Wt 52.7 kg (116 lb 1.6 oz)   SpO2 97%   BMI 20.57 kg/m²       Pt tolerated treatment well.  No adverse reaction noted. Port flushed per policy and needle removed, 2x2 and paper tape placed.  Pt given handout indicating what time to remove OBI device, pt verbalized understanding. Pt discharged ambulatory in no acute distress at 1035, accompanied by self. Next appointment 8/10/20 @ 0800, notified [facility] at [facility] office to schedule appt.

## 2020-07-22 NOTE — PROGRESS NOTES
Bernadette Silva is a 65 y.o. female here for follow up for:  Chief Complaint   Patient presents with    Lung Cancer     met    Chemotherapy   Cycle 2 Day 3 Carboplatin, Etoposide, Durvalumab    1. Have you been to the ER, urgent care clinic since your last visit? Hospitalized since your last visit? no    2. Have you seen or consulted any other health care providers outside of the [address] [place] [address] since your last visit? Include any pap smears or colon screening. ENT for nose bleed that would not stop about three weeks ago. Had to cauterize it. Pt states everything has been fine since last visit. No concerns brought up. Sees no need for palliative yet.

## 2020-07-22 NOTE — PROGRESS NOTES
[facility]  [address], [address] [facility] [address], [address] [facility]  [phone]      Oncology Follow Up Note        Patient: Aide Chowdary MRN: [id number]  SSN: [id number]    YOB: 1950  Age: 65 y.o. Sex: female        Diagnosis     1. 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 Palliative chemotherapy with    Carboplatin/etoposide/durvalumab, Cycle 2 Day 3    Subjective:      Aide Chowdary is a 65 y.o. 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 She has been experiencing mild dyspnea for over 2 years. Occasional cough, no phlegm or hemoptysis. She has some pain and discomfort in the left chest wall and RUQ. The pain is not bad and she does not take medicines. She is also having insomnia. She works for [employer] [address] [place] [address]. She was seen in the ED. CT showed a mass in the LLL invading into the mediastinum and hepatic metastasis. She denies headache or bone pains. CT guided bx established the diagnosis. Bone scan shows widespread bony metastasis. Ms. Shan Wood is receiving palliative chemotherapy. She is doing extremely well and states the bone pain in the left rib area has resolved. She does not some pedal edema and is propping up her feet in the evenings.        Review of Systems:    Constitutional: negative  Eyes: negative  Ears, Nose, Mouth, Throat, and Face: negative  Respiratory: negative  Cardiovascular: negative  Gastrointestinal: negative  Genitourinary:negative  Integument/Breast: negative  Hematologic/Lymphatic: negative  Musculoskeletal:negative  Neurological: negative        Past Medical History:   Diagnosis Date    CAD (coronary artery disease)     Cancer ([facility] [address]     lung     Past Surgical History:   Procedure Laterality Date    HX HEART CATHETERIZATION      HX HYSTERECTOMY      IR BX TRANSCATHETER  6/22/2020    IR INSERT TUNL CVC W PORT OVER 5 YEARS  6/22/2020      Family History   Problem Relation Age of Onset    Heart Disease Father     Cancer Sister      Social History     Tobacco Use    Smoking status: Former Smoker     Last attempt to quit: 2017     Years since quitting: 3.5    Smokeless tobacco: Never Used    Tobacco comment: 1 per week as of 2017   Substance Use Topics    Alcohol use: Never     Frequency: Never      Prior to Admission medications    Medication Sig Start Date End Date Taking? Authorizing Provider   lidocaine-prilocaine (EMLA) topical cream Apply  to affected area as needed for Pain. 6/24/20  Yes Stanislaw Miranda MD   ondansetron (ZOFRAN ODT) 4 mg disintegrating tablet Take 1 Tab by mouth every eight (8) hours as needed for Nausea or Vomiting. 6/24/20  Yes Stanislaw Miranda MD   prochlorperazine (COMPAZINE) 10 mg tablet Take 0.5 Tabs by mouth every six (6) hours as needed for Nausea. 6/24/20  Yes Stanislaw Miranda MD   budesonide/formoterol fumarate (SYMBICORT IN) Take  by inhalation. Yes Provider, Historical   clopidogrel (PLAVIX) 75 mg tab Take 1 Tab by mouth daily. 7/25/19  Yes Adriana Olivo MD   diphenhydrAMINE (BENADRYL) 25 mg capsule Take 1 Cap by mouth every six (6) hours as needed for Itching. 7/24/19  Yes Adriana Olivo MD   aspirin delayed-release 81 mg tablet Take 81 mg by mouth daily. Yes Provider, Historical   metoprolol succinate (TOPROL-XL) 25 mg XL tablet Take 25 mg by mouth daily. Yes Provider, Historical   cholecalciferol (VITAMIN D3) 1,000 unit cap Take  by mouth daily. Yes Provider, Historical   calcium carbonate (TUMS) 200 mg calcium (500 mg) chew Take 1 Tab by mouth daily.    Yes Provider, Historical              Allergies   Allergen Reactions    Codeine Itching           Objective:     Vitals:    07/22/20 0825   BP: 122/69   Pulse: 84   Resp: 18   Temp: 97.1 °F (36.2 °C)   SpO2: 97% Weight: 116 lb (52.6 kg)   Height: 5' 3\" (1.6 m)      Pain Scale: 0 - No pain/10         Physical Exam:    GENERAL: alert, cooperative, no distress, appears stated age  EYE: conjunctivae/corneas clear. PERRL, EOM's intact  LYMPHATIC: Cervical, supraclavicular, and axillary nodes normal.   THROAT & NECK: normal and no erythema or exudates noted. LUNG: clear to auscultation bilaterally  HEART: regular rate and rhythm, S1, S2 normal, no murmur, click, rub or gallop  ABDOMEN: soft, non-tender. Bowel sounds normal. No masses,  no organomegaly  EXTREMITIES:  extremities normal, atraumatic, no cyanosis or edema  SKIN: Normal.  NEUROLOGIC: AOx3. Gait normal. Reflexes and motor strength normal and symmetric. Cranial nerves 2-12 and sensation grossly intact. CT Results (most recent):  Results from Hospital Encounter encounter on 06/10/20   CT CHEST W CONT    Narrative EXAM:  CT CHEST W CONT    INDICATION:  concern for lung cancer    COMPARISON: CT abdomen/pelvis 6/10/2020, chest radiograph 7/10/2019. CONTRAST:  100 cc of Isovue-370. TECHNIQUE: Multislice helical CT was performed from the thoracic inlet to the  adrenal glands after the uneventful administration of intravenous contrast.  Contiguous 5 mm axial images were reconstructed and lung and soft tissue windows  were generated. Coronal and sagittal reformations were generated. CT dose  reduction was achieved through use of a standardized protocol tailored for this  examination and automatic exposure control for dose modulation. FINDINGS:  Lungs/Pleura: There is a confluent left lower lobe perihilar mass extending into  the left hilum and posterior mediastinum measuring approximately 5.6 x 4.6 x 4.4  cm (series 301 image 28 and series 604 image 80). There is encasement and severe  narrowing of the left lower lobe segmental pulmonary arteries. There is  encasement of the left lower lobe segmental bronchi, but with only mild  narrowing.  There are some adjacent areas of fingerlike, tubular tumor extension  in the superior segment of the left lower lobe (series 301 image 26, image 29,  and image 27). There is severe emphysema. No additional suspicious pulmonary  nodules are identified. No pleural effusion. Axilla/Soft Tissue: No pathologic axillary adenopathy. Mediastinum: The heart is normal in size. No pericardial effusion. Coronary  artery stents noted. Enlarged left lower paratracheal lymph node measuring 12 mm  (series 301 image 22). Moderate calcific atherosclerosis of the abdominal aorta. Upper Abdomen: Please see dedicated CT abdomen/pelvis performed concurrently for  description of diffuse hepatic metastatic disease and upper abdominal gloria  metastases. Bones: No evidence of acute fracture, dislocation, or aggressive osseous  abnormality. Impression IMPRESSION:    1. Confluent left lower lobe perihilar mass extending into the left hilum and  posterior mediastinum measuring 5.6 x 4.6 x 4.4 cm, favored to represent primary  lung malignancy. Areas of fingerlike, tubular extension of the tumor in the  superior segment of the left upper lobe, at least in part representing  endobronchial spread. Encasement and severe narrowing of the left lower lobe  segmental pulmonary arteries. Encasement of the left lower lobe segmental  bronchi, but with only mild narrowing. 2. Additional enlarged left lower paratracheal lymph node, favored to represent  gloria metastasis. 3. Please see dedicated CT abdomen/pelvis for description of diffuse hepatic  metastatic disease and upper abdominal gloria metastases. 4. Severe emphysema. 23X           I personally reviewed the images. LLL lung mass with invasion in the mediastinum. Numerous liver metastasis.        Lab Results   Component Value Date/Time    WBC 4.3 07/20/2020 08:19 AM    HGB 8.4 (L) 07/20/2020 08:19 AM    HCT 22.9 (L) 07/20/2020 08:19 AM    PLATELET 856 32/13/4429 08:19 AM    .1 (H) 07/20/2020 08:19 AM       Lab Results   Component Value Date/Time    Sodium 141 07/20/2020 08:19 AM    Potassium 3.5 07/20/2020 08:19 AM    Chloride 109 (H) 07/20/2020 08:19 AM    CO2 26 07/20/2020 08:19 AM    Anion gap 6 07/20/2020 08:19 AM    Glucose 91 07/20/2020 08:19 AM    BUN 9 07/20/2020 08:19 AM    Creatinine 0.71 07/20/2020 08:19 AM    BUN/Creatinine ratio 13 07/20/2020 08:19 AM    GFR est AA >60 07/20/2020 08:19 AM    GFR est non-AA >60 07/20/2020 08:19 AM    Calcium 8.3 (L) 07/20/2020 08:19 AM    Bilirubin, total 0.8 07/20/2020 08:19 AM    Alk. phosphatase 504 (H) 07/20/2020 08:19 AM    Protein, total 6.2 (L) 07/20/2020 08:19 AM    Albumin 2.4 (L) 07/20/2020 08:19 AM    Globulin 3.8 07/20/2020 08:19 AM    A-G Ratio 0.6 (L) 07/20/2020 08:19 AM    ALT (SGPT) 28 07/20/2020 08:19 AM       CT Results (most recent):  Results from [place] encounter on 06/10/20   CT CHEST W CONT    Narrative EXAM:  CT CHEST W CONT    INDICATION:  concern for lung cancer    COMPARISON: CT abdomen/pelvis 6/10/2020, chest radiograph 7/10/2019. CONTRAST:  100 cc of Isovue-370. TECHNIQUE: Multislice helical CT was performed from the thoracic inlet to the  adrenal glands after the uneventful administration of intravenous contrast.  Contiguous 5 mm axial images were reconstructed and lung and soft tissue windows  were generated. Coronal and sagittal reformations were generated. CT dose  reduction was achieved through use of a standardized protocol tailored for this  examination and automatic exposure control for dose modulation. FINDINGS:  Lungs/Pleura: There is a confluent left lower lobe perihilar mass extending into  the left hilum and posterior mediastinum measuring approximately 5.6 x 4.6 x 4.4  cm (series 301 image 28 and series 604 image 80). There is encasement and severe  narrowing of the left lower lobe segmental pulmonary arteries.  There is  encasement of the left lower lobe segmental bronchi, but with only mild  narrowing. There are some adjacent areas of fingerlike, tubular tumor extension  in the superior segment of the left lower lobe (series 301 image 26, image 29,  and image 27). There is severe emphysema. No additional suspicious pulmonary  nodules are identified. No pleural effusion. Axilla/Soft Tissue: No pathologic axillary adenopathy. Mediastinum: The heart is normal in size. No pericardial effusion. Coronary  artery stents noted. Enlarged left lower paratracheal lymph node measuring 12 mm  (series 301 image 22). Moderate calcific atherosclerosis of the abdominal aorta. Upper Abdomen: Please see dedicated CT abdomen/pelvis performed concurrently for  description of diffuse hepatic metastatic disease and upper abdominal gloria  metastases. Bones: No evidence of acute fracture, dislocation, or aggressive osseous  abnormality. Impression IMPRESSION:    1. Confluent left lower lobe perihilar mass extending into the left hilum and  posterior mediastinum measuring 5.6 x 4.6 x 4.4 cm, favored to represent primary  lung malignancy. Areas of fingerlike, tubular extension of the tumor in the  superior segment of the left upper lobe, at least in part representing  endobronchial spread. Encasement and severe narrowing of the left lower lobe  segmental pulmonary arteries. Encasement of the left lower lobe segmental  bronchi, but with only mild narrowing. 2. Additional enlarged left lower paratracheal lymph node, favored to represent  gloria metastasis. 3. Please see dedicated CT abdomen/pelvis for description of diffuse hepatic  metastatic disease and upper abdominal gloria metastases. 4. Severe emphysema. 23X             I personally reviewed the images. Lung mass, mediastinal nodes, disease in the liver. Assessment:     1.  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 A detailed system by system evaluation of side effect was performed to assess chemotherapy related toxicity. Blood counts are acceptable. Results reviewed with the patient    Doing well  Symptoms are better      2. Bone metastasis    Starting Denosumab on Cycle 2 of treatment      3. Pedal edema - bilateral    No pain or redness. Will decrease after elevation. 1+  Recommended compression stockings      4. Chemotherapy induced anemia    Observation        Plan:       1. Continue palliative chemotherapy - carboplatin/etoposide/durvalumab  2. Return in 3 weeks    I saw the patient in conjunction with PRASAD Mariano      Signed by: Tremayne Barrett MD                     July 22, 2020        CC. Juana Gonzalez MD  CC.  Bjorn Leyden, MD

## 2020-08-10 NOTE — PROGRESS NOTES
[facility] Visit Note 
 
0805 Pt arrived at [facility] ambulatory and in no distress for C3D1. Assessment completed, no new complaints voiced. Port accessed per protocol with positive blood return. Patient Vitals for the past 12 hrs: 
 Temp Pulse Resp BP  
08/10/20 1438  83  138/83  
08/10/20 0808 97.6 °F (36.4 °C) 94 18 140/74 Recent Results (from the past 12 hour(s)) CBC WITH AUTOMATED DIFF Collection Time: 08/10/20  8:18 AM  
Result Value Ref Range WBC 3.8 3.6 - 11.0 K/uL  
 RBC 2.75 (L) 3.80 - 5.20 M/uL HGB 8.8 (L) 11.5 - 16.0 g/dL HCT 28.4 (L) 35.0 - 47.0 % .3 (H) 80.0 - 99.0 FL  
 MCH 32.0 26.0 - 34.0 PG  
 MCHC 31.0 30.0 - 36.5 g/dL RDW 21.8 (H) 11.5 - 14.5 % PLATELET 002 559 - 213 K/uL MPV 10.0 8.9 - 12.9 FL  
 NRBC 0.0 0  WBC ABSOLUTE NRBC 0.00 0.00 - 0.01 K/uL NEUTROPHILS 67 32 - 75 % LYMPHOCYTES 18 12 - 49 % MONOCYTES 13 5 - 13 % EOSINOPHILS 0 0 - 7 % BASOPHILS 1 0 - 1 % IMMATURE GRANULOCYTES 1 (H) 0.0 - 0.5 % ABS. NEUTROPHILS 2.6 1.8 - 8.0 K/UL  
 ABS. LYMPHOCYTES 0.7 (L) 0.8 - 3.5 K/UL  
 ABS. MONOCYTES 0.5 0.0 - 1.0 K/UL  
 ABS. EOSINOPHILS 0.0 0.0 - 0.4 K/UL  
 ABS. BASOPHILS 0.0 0.0 - 0.1 K/UL  
 ABS. IMM. GRANS. 0.0 0.00 - 0.04 K/UL  
 DF SMEAR SCANNED    
 RBC COMMENTS ANISOCYTOSIS 2+ 
    
 RBC COMMENTS MACROCYTOSIS 1+ 
    
 RBC COMMENTS TEARDROP CELLS 
PRESENT 
    
 RBC COMMENTS POLYCHROMASIA PRESENT 
    
METABOLIC PANEL, COMPREHENSIVE Collection Time: 08/10/20  8:18 AM  
Result Value Ref Range Sodium 142 136 - 145 mmol/L Potassium 3.8 3.5 - 5.1 mmol/L Chloride 110 (H) 97 - 108 mmol/L  
 CO2 26 21 - 32 mmol/L Anion gap 6 5 - 15 mmol/L Glucose 95 65 - 100 mg/dL BUN 10 6 - 20 MG/DL Creatinine 0.57 0.55 - 1.02 MG/DL  
 BUN/Creatinine ratio 18 12 - 20 GFR est AA >60 >60 ml/min/1.73m2 GFR est non-AA >60 >60 ml/min/1.73m2  Calcium 8.2 (L) 8.5 - 10.1 MG/DL  
 Bilirubin, total 0.7 0.2 - 1.0 MG/DL  
 ALT (SGPT) 18 12 - 78 U/L  
 AST (SGOT) 17 15 - 37 U/L Alk. phosphatase 320 (H) 45 - 117 U/L Protein, total 6.5 6.4 - 8.2 g/dL Albumin 3.1 (L) 3.5 - 5.0 g/dL Globulin 3.4 2.0 - 4.0 g/dL A-G Ratio 0.9 (L) 1.1 - 2.2 TSH 3RD GENERATION Collection Time: 08/10/20  8:18 AM  
Result Value Ref Range TSH 1.72 0.36 - 3.74 uIU/mL Medications received: 
Medications Administered 0.9% sodium chloride infusion Admin Date 
08/10/2020 Action New Bag Dose 25 mL/hr Rate 25 mL/hr Route IntraVENous Administered By 
Anahi Wilhelm RN  
  
  
 CARBOplatin (PARAPLATIN) 369 mg in 0.9% sodium chloride 250 mL, overfill volume 25 mL chemo infusion Admin Date 
08/10/2020 Action New Bag Dose 
369 mg Rate 623.8 mL/hr Route IntraVENous Administered By 
Anahi Wilhelm RN  
  
  
 dexamethasone (DECADRON) 12 mg in 0.9% sodium chloride 50 mL IVPB Admin Date 
08/10/2020 Action Given Dose 
12 mg Route IntraVENous Administered By 
Anahi Wilhelm RN  
  
  
 durvalumab (IMFINZI) 1,500 mg in 0.9% sodium chloride 100 mL, overfill volume 10 mL IVPB Admin Date 
08/10/2020 Action New Bag Dose 
1500 mg Rate 140 mL/hr Route IntraVENous Administered By 
Anahi Wilhelm RN  
  
  
 etoposide (VEPESID) 157 mg in 0.9% sodium chloride 500 mL, overfill volume 50 mL chemo infusion Admin Date 
08/10/2020 Action New Bag Dose 
157 mg Rate 
557.9 mL/hr Route IntraVENous Administered By 
Anahi Wilhelm RN  
  
  
 fosaprepitant (EMEND) 150 mg in 0.9% sodium chloride 150 mL IVPB Admin Date 
08/10/2020 Action Given Dose 
150 mg Rate 450 mL/hr Route IntraVENous Administered By 
Anahi Wilhelm RN  
  
  
 heparin (porcine) pf 300-500 Units Admin Date 
08/10/2020 Action Given Dose 
500 Units Route InterCATHeter Administered By 
Anahi Wilhelm RN  
  
  
 palonosetron HCl (ALOXI) injection 0.25 mg   
 Admin Date 
08/10/2020 Action Given Dose 0.25 mg Route IntraVENous Administered By 
Marylene Maul, PARUL  
  
  
 saline peripheral flush soln 10 mL Admin Date 
08/10/2020 Action Given Dose 
10 mL Route InterCATHeter Administered By 
Marylene Maul, PARUL  
  
  
  
 
 
8457 Tolerated treatment well, no adverse reaction noted. Port intact, flushed and capped. D/Cd from [address] and in no distress accompanied by self. Next appt 8/11.

## 2020-08-11 NOTE — PROGRESS NOTES
[address] [facility] Visit Note    0805 Pt arrived at [facility] ambulatory and in no distress for C3D2. Assessment completed, no new complaints voiced. Port access intact with positive blood return. Patient Vitals for the past 12 hrs:   Temp Pulse Resp BP   08/11/20 1020 -- 75 -- 135/74   08/11/20 0807 97.8 °F (36.6 °C) 85 18 136/71       Medications received:  Medications Administered     0.9% sodium chloride infusion     Admin Date  08/11/2020 Action  New Bag Dose  25 mL/hr Rate  25 mL/hr Route  IntraVENous Administered By  Rosalind Ochoa RN          dexamethasone (DECADRON) 4 mg/mL injection 8 mg     Admin Date  08/11/2020 Action  Given Dose  8 mg Route  IntraVENous Administered By  Rosalind Ochoa RN          etoposide (VEPESID) 157 mg in 0.9% sodium chloride 500 mL, overfill volume 50 mL chemo infusion     Admin Date  08/11/2020 Action  New Bag Dose  157 mg Rate  557.9 mL/hr Route  IntraVENous Administered By  Rosalind Ochoa RN          heparin (porcine) pf 300-500 Units     Admin Date  08/11/2020 Action  Given Dose  500 Units Route  InterCATHeter Administered By  Rosalind Ochoa RN          saline peripheral flush soln 10 mL     Admin Date  08/11/2020 Action  Given Dose  10 mL Route  InterCATHeter Administered By  Rosalind Ochoa RN           Admin Date  08/11/2020 Action  Given Dose  10 mL Route  InterCATHeter Administered By  Rosalind Ochoa RN                1020 Tolerated treatment well, no adverse reaction noted. Port flushed ,capped and intact. D/Cd from [facility] ambulatory and in no distress accompanied by self. Next appt 8/12.

## 2020-08-12 NOTE — PROGRESS NOTES
2001 [facility] 
at [place] [address], [facility] [place] [facility], [address] 
[phone] Oncology Follow Up Note Patient: Katrina Maravilla MRN: [id number]  SSN: [id number] YOB: 1950  Age: 65 y.o. Sex: female Diagnosis 1. 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 Palliative chemotherapy with Carboplatin/etoposide/durvalumab, Cycle 3 Day 3 Subjective:  
  
Katrina Maravilla is a 65 y.o. 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 She has been experiencing mild dyspnea for over 2 years. Occasional cough, no phlegm or hemoptysis. She has some pain and discomfort in the left chest wall and RUQ. The pain is not bad and she does not take medicines. She is also having insomnia. She works for [email or web address] [address] [place] [address]. She was seen in the ED. CT showed a mass in the LLL invading into the mediastinum and hepatic metastasis. She denies headache or bone pains. CT guided bx established the diagnosis. Bone scan shows widespread bony metastasis. Ms. Monica Adams is receiving palliative chemotherapy. She is doing extremely well and states the bone pain in the left rib area has resolved. She does not some pedal edema and is propping up her feet in the evenings. Review of Systems: 
 
Constitutional: negative Eyes: negative Ears, Nose, Mouth, Throat, and Face: negative Respiratory: negative Cardiovascular: negative Gastrointestinal: negative Genitourinary:negative Integument/Breast: negative Hematologic/Lymphatic: negative Musculoskeletal:negative Neurological: negative Past Medical History:  
Diagnosis Date  CAD (coronary artery disease)  Cancer ([address] lung Past Surgical History:  
Procedure Laterality Date  HX HEART CATHETERIZATION    
 HX HYSTERECTOMY  IR BX TRANSCATHETER  6/22/2020  IR INSERT TUNL CVC W PORT OVER 5 YEARS  6/22/2020 Family History Problem Relation Age of Onset  Heart Disease Father  Cancer Sister Social History Tobacco Use  Smoking status: Former Smoker Last attempt to quit: 2017 Years since quitting: 3.6  Smokeless tobacco: Never Used  Tobacco comment: 1 per week as of 2017 Substance Use Topics  Alcohol use: Never Frequency: Never Prior to Admission medications Medication Sig Start Date End Date Taking? Authorizing Provider  
lidocaine-prilocaine (EMLA) topical cream Apply  to affected area as needed for Pain. 6/24/20   Missy Tafoya MD  
ondansetron (ZOFRAN ODT) 4 mg disintegrating tablet Take 1 Tab by mouth every eight (8) hours as needed for Nausea or Vomiting. 6/24/20   Missy Tafoya MD  
prochlorperazine (COMPAZINE) 10 mg tablet Take 0.5 Tabs by mouth every six (6) hours as needed for Nausea. 6/24/20   Missy Tafoya MD  
budesonide/formoterol fumarate (SYMBICORT IN) Take  by inhalation. Provider, Historical  
clopidogrel (PLAVIX) 75 mg tab Take 1 Tab by mouth daily. 7/25/19   Katie Cochran MD  
diphenhydrAMINE (BENADRYL) 25 mg capsule Take 1 Cap by mouth every six (6) hours as needed for Itching. 7/24/19   Katie Cochran MD  
aspirin delayed-release 81 mg tablet Take 81 mg by mouth daily. Provider, Historical  
metoprolol succinate (TOPROL-XL) 25 mg XL tablet Take 25 mg by mouth daily. Provider, Historical  
cholecalciferol (VITAMIN D3) 1,000 unit cap Take  by mouth daily. Provider, Historical  
calcium carbonate (TUMS) 200 mg calcium (500 mg) chew Take 1 Tab by mouth daily. Provider, Historical  
  
 
 
 
 
Allergies Allergen Reactions  Codeine Itching Objective:  
 
Vitals:  
 08/12/20 0827 BP: 148/74 Pulse: 74 Resp: 16 Temp: 98.3 °F (36.8 °C) Weight: 110 lb (49.9 kg) Height: 5' 3\" (1.6 m) Pain Scale: 0 - No pain/10 Physical Exam: 
 
GENERAL: alert, cooperative, no distress, appears stated age EYE: conjunctivae/corneas clear. PERRL, EOM's intact LYMPHATIC: Cervical, supraclavicular, and axillary nodes normal.  
THROAT & NECK: normal and no erythema or exudates noted. LUNG: clear to auscultation bilaterally HEART: regular rate and rhythm, S1, S2 normal, no murmur, click, rub or gallop ABDOMEN: soft, non-tender. Bowel sounds normal. No masses,  no organomegaly EXTREMITIES:  extremities normal, atraumatic, no cyanosis or edema SKIN: Normal. 
NEUROLOGIC: AOx3. Gait normal. Reflexes and motor strength normal and symmetric. Cranial nerves 2-12 and sensation grossly intact. CT Results (most recent): 
Results from [facility] Encounter encounter on 06/10/20 CT CHEST W CONT Narrative EXAM:  CT CHEST W CONT INDICATION:  concern for lung cancer COMPARISON: CT abdomen/pelvis 6/10/2020, chest radiograph 7/10/2019. CONTRAST:  100 cc of Isovue-370. TECHNIQUE: Multislice helical CT was performed from the thoracic inlet to the 
adrenal glands after the uneventful administration of intravenous contrast. 
Contiguous 5 mm axial images were reconstructed and lung and soft tissue windows 
were generated. Coronal and sagittal reformations were generated. CT dose 
reduction was achieved through use of a standardized protocol tailored for this 
examination and automatic exposure control for dose modulation. FINDINGS: 
Lungs/Pleura: There is a confluent left lower lobe perihilar mass extending into 
the left hilum and posterior mediastinum measuring approximately 5.6 x 4.6 x 4.4 
cm (series 301 image 28 and series 604 image 80). There is encasement and severe 
narrowing of the left lower lobe segmental pulmonary arteries. There is 
encasement of the left lower lobe segmental bronchi, but with only mild narrowing. There are some adjacent areas of fingerlike, tubular tumor extension 
in the superior segment of the left lower lobe (series 301 image 26, image 29, 
and image 27). There is severe emphysema. No additional suspicious pulmonary 
nodules are identified. No pleural effusion. Axilla/Soft Tissue: No pathologic axillary adenopathy. Mediastinum: The heart is normal in size. No pericardial effusion. Coronary 
artery stents noted. Enlarged left lower paratracheal lymph node measuring 12 mm 
(series 301 image 22). Moderate calcific atherosclerosis of the abdominal aorta. Upper Abdomen: Please see dedicated CT abdomen/pelvis performed concurrently for 
description of diffuse hepatic metastatic disease and upper abdominal gloria 
metastases. Bones: No evidence of acute fracture, dislocation, or aggressive osseous 
abnormality. Impression IMPRESSION:   
1. Confluent left lower lobe perihilar mass extending into the left hilum and 
posterior mediastinum measuring 5.6 x 4.6 x 4.4 cm, favored to represent primary 
lung malignancy. Areas of fingerlike, tubular extension of the tumor in the 
superior segment of the left upper lobe, at least in part representing 
endobronchial spread. Encasement and severe narrowing of the left lower lobe 
segmental pulmonary arteries. Encasement of the left lower lobe segmental 
bronchi, but with only mild narrowing. 2. Additional enlarged left lower paratracheal lymph node, favored to represent 
gloria metastasis. 3. Please see dedicated CT abdomen/pelvis for description of diffuse hepatic 
metastatic disease and upper abdominal gloria metastases. 4. Severe emphysema. 23X I personally reviewed the images. LLL lung mass with invasion in the mediastinum. Numerous liver metastasis. Lab Results Component Value Date/Time  WBC 3.8 08/10/2020 08:18 AM  
 HGB 8.8 (L) 08/10/2020 08:18 AM  
 HCT 28.4 (L) 08/10/2020 08:18 AM  
 PLATELET 808 53/64/8471 08:18 AM  
 .3 (H) 08/10/2020 08:18 AM  
 
 
Lab Results Component Value Date/Time Sodium 142 08/10/2020 08:18 AM  
 Potassium 3.8 08/10/2020 08:18 AM  
 Chloride 110 (H) 08/10/2020 08:18 AM  
 CO2 26 08/10/2020 08:18 AM  
 Anion gap 6 08/10/2020 08:18 AM  
 Glucose 95 08/10/2020 08:18 AM  
 BUN 10 08/10/2020 08:18 AM  
 Creatinine 0.57 08/10/2020 08:18 AM  
 BUN/Creatinine ratio 18 08/10/2020 08:18 AM  
 GFR est AA >60 08/10/2020 08:18 AM  
 GFR est non-AA >60 08/10/2020 08:18 AM  
 Calcium 8.2 (L) 08/10/2020 08:18 AM  
 Bilirubin, total 0.7 08/10/2020 08:18 AM  
 Alk. phosphatase 320 (H) 08/10/2020 08:18 AM  
 Protein, total 6.5 08/10/2020 08:18 AM  
 Albumin 3.1 (L) 08/10/2020 08:18 AM  
 Globulin 3.4 08/10/2020 08:18 AM  
 A-G Ratio 0.9 (L) 08/10/2020 08:18 AM  
 ALT (SGPT) 18 08/10/2020 08:18 AM  
 
 
CT Results (most recent): 
Results from Hospital Encounter encounter on 06/10/20 CT CHEST W CONT Narrative EXAM:  CT CHEST W CONT INDICATION:  concern for lung cancer COMPARISON: CT abdomen/pelvis 6/10/2020, chest radiograph 7/10/2019. CONTRAST:  100 cc of Isovue-370. TECHNIQUE: Multislice helical CT was performed from the thoracic inlet to the 
adrenal glands after the uneventful administration of intravenous contrast. 
Contiguous 5 mm axial images were reconstructed and lung and soft tissue windows 
were generated. Coronal and sagittal reformations were generated. CT dose 
reduction was achieved through use of a standardized protocol tailored for this 
examination and automatic exposure control for dose modulation. FINDINGS: 
Lungs/Pleura: There is a confluent left lower lobe perihilar mass extending into 
the left hilum and posterior mediastinum measuring approximately 5.6 x 4.6 x 4.4 
cm (series 301 image 28 and series 604 image 80). There is encasement and severe 
narrowing of the left lower lobe segmental pulmonary arteries. There is encasement of the left lower lobe segmental bronchi, but with only mild 
narrowing. There are some adjacent areas of fingerlike, tubular tumor extension 
in the superior segment of the left lower lobe (series 301 image 26, image 29, 
and image 27). There is severe emphysema. No additional suspicious pulmonary 
nodules are identified. No pleural effusion. Axilla/Soft Tissue: No pathologic axillary adenopathy. Mediastinum: The heart is normal in size. No pericardial effusion. Coronary 
artery stents noted. Enlarged left lower paratracheal lymph node measuring 12 mm 
(series 301 image 22). Moderate calcific atherosclerosis of the abdominal aorta. Upper Abdomen: Please see dedicated CT abdomen/pelvis performed concurrently for 
description of diffuse hepatic metastatic disease and upper abdominal gloria 
metastases. Bones: No evidence of acute fracture, dislocation, or aggressive osseous 
abnormality. Impression IMPRESSION:   
1. Confluent left lower lobe perihilar mass extending into the left hilum and 
posterior mediastinum measuring 5.6 x 4.6 x 4.4 cm, favored to represent primary 
lung malignancy. Areas of fingerlike, tubular extension of the tumor in the 
superior segment of the left upper lobe, at least in part representing 
endobronchial spread. Encasement and severe narrowing of the left lower lobe 
segmental pulmonary arteries. Encasement of the left lower lobe segmental 
bronchi, but with only mild narrowing. 2. Additional enlarged left lower paratracheal lymph node, favored to represent 
gloria metastasis. 3. Please see dedicated CT abdomen/pelvis for description of diffuse hepatic 
metastatic disease and upper abdominal gloria metastases. 4. Severe emphysema. 23X I personally reviewed the images. Lung mass, mediastinal nodes, disease in the liver. Assessment: 1. 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 A detailed system by system evaluation of side effect was performed to assess chemotherapy related toxicity. Blood counts are acceptable. Results reviewed with the patient Doing well Symptoms are better 2. Bone metastasis Receiving Denosumab. Plan to change to zometa every 6 weeks for patient convenience 3. Pedal edema - bilateral 
 
No pain or redness. Will decrease after elevation. 1+ 
Recommended compression stockings 4. Chemotherapy induced anemia Observation 5. Severe protein calorie malnutrition Dietician consultation Protein supplements Plan: 1. Continue palliative chemotherapy - carboplatin/etoposide/durvalumab 2. Change xgeva to zometa every 6 weeks to line up with patients treatments. 3. Return in 3 weeks I saw the patient in conjunction with PRASAD Longo Signed by: Rogelio Barker MD 
                   August 12, 2020 
 
 
 
CC. Ab Lowe MD 
CC.  Poornima Griffith MD

## 2020-08-12 NOTE — PROGRESS NOTES
8000 [facility] Visit Note 
 
0800 Pt arrived at [facility] ambulatory and in no distress for C3D3. Assessment completed, no new complaints voiced. Port access intact with positve blood return. Patient Vitals for the past 12 hrs: 
 Temp Pulse Resp BP  
08/12/20 1006  70  145/81  
08/12/20 0759 98.3 °F (36.8 °C) 74 16 148/74 Medications received: 
Medications Administered 0.9% sodium chloride infusion Admin Date 
08/12/2020 Action New Bag Dose 25 mL/hr Rate 25 mL/hr Route IntraVENous Administered By 
Liu Records, RN  
  
  
 dexamethasone (DECADRON) 4 mg/mL injection 8 mg Admin Date 
08/12/2020 Action Given Dose 8 mg Route IntraVENous Administered By 
Liu Records, RN  
  
  
 etoposide (VEPESID) 157 mg in 0.9% sodium chloride 500 mL, overfill volume 50 mL chemo infusion Admin Date 
08/12/2020 Action New Bag Dose 
157 mg Rate 
557.9 mL/hr Route IntraVENous Administered By 
Liu Records, RN  
  
  
 heparin (porcine) pf 300-500 Units Admin Date 
08/12/2020 Action Given Dose 
500 Units Route InterCATHeter Administered By 
Liu Records, RN  
  
  
 pegfilgrastim (NEULASTA) wearable SQ injector 6 mg Admin Date 
08/12/2020 Action Given Dose 6 mg Route SubCUTAneous Administered By 
Liu Records, RN  
  
  
 saline peripheral flush soln 10 mL Admin Date 
08/12/2020 Action Given Dose 
10 mL Route InterCATHeter Administered By 
Liu Records, RN Xgeva due 8/19. NP notified to make an appointment next week vs delay and give with chemo. Xgeva to be given with next chemo appointment 1010 Tolerated treatment well, no adverse reaction noted. Port flushed and de-accessed. D/Cd from [facility] ambulatory and in no distress accompanied by self. Next appt 8/31

## 2020-08-12 NOTE — PROGRESS NOTES
Janae You is a 65 y.o. female here for follow up for: Chief Complaint Patient presents with  Lung Cancer  
  met  Chemotherapy Cycle 3 Day 3 Etoposide 1. Have you been to the ER, urgent care clinic since your last visit? Hospitalized since your last visit? no 
 
2. Have you seen or consulted any other health care providers outside of the [address] [place] [address] since your last visit? Include any pap smears or colon screening. no 
 
Pt states everything has been fine since last visit. No concerns brought up.

## 2020-08-12 NOTE — LETTER
8/12/20 Patient: Penny Jerry YOB: 1950 Date of Visit: 8/12/2020 [address] [id number] VIA Facsimile: [phone] Dear Flavia Fonseca MD, Thank you for referring Ms. Chris Eubanks to [address] [place] [address] for evaluation. My notes for this consultation are attached. If you have questions, please do not hesitate to call me. I look forward to following your patient along with you.  
 
 
Sincerely, 
 
Bhargavi Taylor NP

## 2020-08-12 NOTE — PROGRESS NOTES
Case d/w NP Anne-Marie. We will give pt denosumab on 8/31 instead of next week to keep on schedule with infusions. Called [place] who will inform Tawny Barry.

## 2020-08-31 NOTE — PROGRESS NOTES
Pt arrived to [address] [place] [address]. ambulatory in no acute distress at 0800 for Imfinzi/Carbo/Etoposide C4D1.  Assessment unremarkable. R chest port accessed without issue and positive blood return noted.  Labs obtained, CBC, CMP. Visit Vitals /82 (BP 1 Location: Left arm, BP Patient Position: Sitting) Pulse 87 Temp 97.7 °F (36.5 °C) Resp 18 Ht 5' 3\" (1.6 m) Wt 49.1 kg (108 lb 4.8 oz) Breastfeeding No  
BMI 19.18 kg/m² Recent Results (from the past 12 hour(s)) CBC WITH AUTOMATED DIFF Collection Time: 08/31/20  8:03 AM  
Result Value Ref Range WBC 4.1 3.6 - 11.0 K/uL  
 RBC 2.93 (L) 3.80 - 5.20 M/uL HGB 9.7 (L) 11.5 - 16.0 g/dL HCT 31.0 (L) 35.0 - 47.0 % .8 (H) 80.0 - 99.0 FL  
 MCH 33.1 26.0 - 34.0 PG  
 MCHC 31.3 30.0 - 36.5 g/dL  
 RDW 19.9 (H) 11.5 - 14.5 % PLATELET 290 742 - 274 K/uL MPV 9.7 8.9 - 12.9 FL  
 NRBC 0.0 0  WBC ABSOLUTE NRBC 0.00 0.00 - 0.01 K/uL NEUTROPHILS 70 32 - 75 % LYMPHOCYTES 15 12 - 49 % MONOCYTES 13 5 - 13 % EOSINOPHILS 0 0 - 7 % BASOPHILS 1 0 - 1 % IMMATURE GRANULOCYTES 1 (H) 0.0 - 0.5 % ABS. NEUTROPHILS 3.0 1.8 - 8.0 K/UL  
 ABS. LYMPHOCYTES 0.6 (L) 0.8 - 3.5 K/UL  
 ABS. MONOCYTES 0.5 0.0 - 1.0 K/UL  
 ABS. EOSINOPHILS 0.0 0.0 - 0.4 K/UL  
 ABS. BASOPHILS 0.0 0.0 - 0.1 K/UL  
 ABS. IMM. GRANS. 0.0 0.00 - 0.04 K/UL  
 DF SMEAR SCANNED    
 RBC COMMENTS ANISOCYTOSIS 1+ 
    
 RBC COMMENTS MACROCYTOSIS 
1+ METABOLIC PANEL, COMPREHENSIVE Collection Time: 08/31/20  8:03 AM  
Result Value Ref Range Sodium 141 136 - 145 mmol/L Potassium 4.1 3.5 - 5.1 mmol/L Chloride 112 (H) 97 - 108 mmol/L  
 CO2 26 21 - 32 mmol/L Anion gap 3 (L) 5 - 15 mmol/L Glucose 92 65 - 100 mg/dL BUN 13 6 - 20 MG/DL Creatinine 0.50 (L) 0.55 - 1.02 MG/DL  
 BUN/Creatinine ratio 26 (H) 12 - 20 GFR est AA >60 >60 ml/min/1.73m2 GFR est non-AA >60 >60 ml/min/1.73m2  Calcium 7.9 (L) 8.5 - 10.1 MG/DL  
 Bilirubin, total 0.7 0.2 - 1.0 MG/DL  
 ALT (SGPT) 16 12 - 78 U/L  
 AST (SGOT) 19 15 - 37 U/L Alk. phosphatase 163 (H) 45 - 117 U/L Protein, total 6.6 6.4 - 8.2 g/dL Albumin 3.2 (L) 3.5 - 5.0 g/dL Globulin 3.4 2.0 - 4.0 g/dL A-G Ratio 0.9 (L) 1.1 - 2.2 Creatinine Clearance 78.3, Corrected Calcium 8.54 Carbo dose to remain the same. The following medications administered: 
[email or web address] Zometa 4mg IV Aloxi IVP Decadron IV Emend IV Imfinzi 1500mg IV over 1 hour Carboplatin 369mg IV over 30 minutes Etoposide 157mg IV over 1 hour Visit Vitals /76 Pulse 85 Temp 97.7 °F (36.5 °C) Resp 18 Ht 5' 3\" (1.6 m) Wt 49.1 kg (108 lb 4.8 oz) Breastfeeding No  
BMI 19.18 kg/m² Pt tolerated treatment well. Port flushed per policy, avalos needle intact, curos cap placed.  Pt discharged ambulatory in no acute distress at 1355, accompanied by self. Next appointment 9/1/20 at 0800.

## 2020-09-01 NOTE — PROGRESS NOTES
12- Pt arrived to [facility] ambulatory in no acute distress for C4D2 Etoposide.  Assessment completed, no new complaints voiced. R chest port remains accessed and positive blood return noted.  The following medications administered: 
NS @ [facility] [place] Decadron 8 mg IVP Etoposide 157 mg IV over 60 mins Patient Vitals for the past 12 hrs: 
 Temp Pulse Resp BP SpO2  
09/01/20 1013  70 16 128/73   
09/01/20 0803 97.7 °F (36.5 °C) 79 16 128/66 98 % 1015- Pt tolerated treatment well, no adverse reactions noted. Port flushed per policy and remains accessed for use tomorrow.  Pt discharged ambulatory in no acute distress, accompanied by self. Next appointment 9/2/20.

## 2020-09-02 NOTE — PROGRESS NOTES
[address] [address], [facility], [address] 
[phone] Oncology Follow Up Note Patient: Paras Erazo MRN: [id number]  SSN: [id number] YOB: 1950  Age: 65 y.o. Sex: female Diagnosis 1. 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 Palliative chemotherapy with Carboplatin/etoposide/durvalumab, Cycle 4 Day 3 Subjective:  
  
Paras Erazo is a 65 y.o. 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 She has been experiencing mild dyspnea for over 2 years. Occasional cough, no phlegm or hemoptysis. She has some pain and discomfort in the left chest wall and RUQ. The pain is not bad and she does not take medicines. She is also having insomnia. She works for [employer] [address] [place] [address]. She was seen in the ED. CT showed a mass in the LLL invading into the mediastinum and hepatic metastasis. She denies headache or bone pains. CT guided bx established the diagnosis. Bone scan shows widespread bony metastasis. Ms. Todd Dsouza is receiving palliative chemotherapy. She is doing extremely well and states the bone pain in the left rib area has resolved. Recent scans show good response to treatment. Review of Systems: 
 
Constitutional: negative Eyes: negative Ears, Nose, Mouth, Throat, and Face: negative Respiratory: negative Cardiovascular: negative Gastrointestinal: negative Genitourinary:negative Integument/Breast: negative Hematologic/Lymphatic: negative Musculoskeletal:negative Neurological: negative Past Medical History:  
Diagnosis Date  CAD (coronary artery disease)  Cancer ([address] lung Past Surgical History:  
Procedure Laterality Date  HX HEART CATHETERIZATION    
  HX HYSTERECTOMY  IR BX TRANSCATHETER  6/22/2020  IR INSERT TUNL CVC W PORT OVER 5 YEARS  6/22/2020 Family History Problem Relation Age of Onset  Heart Disease Father  Cancer Sister Social History Tobacco Use  Smoking status: Former Smoker Last attempt to quit: 2017 Years since quitting: 3.6  Smokeless tobacco: Never Used  Tobacco comment: 1 per week as of 2017 Substance Use Topics  Alcohol use: Never Frequency: Never Prior to Admission medications Medication Sig Start Date End Date Taking? Authorizing Provider  
lidocaine-prilocaine (EMLA) topical cream Apply  to affected area as needed for Pain. 6/24/20  Yes Silverio Brink MD  
ondansetron (ZOFRAN ODT) 4 mg disintegrating tablet Take 1 Tab by mouth every eight (8) hours as needed for Nausea or Vomiting. 6/24/20  Yes Silverio Brink MD  
prochlorperazine (COMPAZINE) 10 mg tablet Take 0.5 Tabs by mouth every six (6) hours as needed for Nausea. 6/24/20  Yes Silverio Brink MD  
budesonide/formoterol fumarate (SYMBICORT IN) Take  by inhalation. Yes Provider, Historical  
clopidogrel (PLAVIX) 75 mg tab Take 1 Tab by mouth daily. 7/25/19  Yes Pamela Meehan MD  
diphenhydrAMINE (BENADRYL) 25 mg capsule Take 1 Cap by mouth every six (6) hours as needed for Itching. 7/24/19  Yes Pamela Meehan MD  
aspirin delayed-release 81 mg tablet Take 81 mg by mouth daily. Yes Provider, Historical  
cholecalciferol (VITAMIN D3) 1,000 unit cap Take  by mouth daily. Yes Provider, Historical  
calcium carbonate (TUMS) 200 mg calcium (500 mg) chew Take 1 Tab by mouth daily. Yes Provider, Historical  
  
 
 
 
 
Allergies Allergen Reactions  Codeine Itching Objective:  
 
Vitals:  
 09/02/20 0831 BP: 131/70 Pulse: 74 Resp: 18 Temp: 98.1 °F (36.7 °C) SpO2: 97% Weight: 110 lb (49.9 kg) Height: 5' 3\" (1.6 m) Pain Scale: 0 - No pain/10 Physical Exam: GENERAL: alert, cooperative, no distress, appears stated age EYE: conjunctivae/corneas clear. PERRL, EOM's intact LYMPHATIC: Cervical, supraclavicular, and axillary nodes normal.  
THROAT & NECK: normal and no erythema or exudates noted. LUNG: clear to auscultation bilaterally HEART: regular rate and rhythm, S1, S2 normal, no murmur, click, rub or gallop ABDOMEN: soft, non-tender. Bowel sounds normal. No masses,  no organomegaly EXTREMITIES:  extremities normal, atraumatic, no cyanosis or edema SKIN: Normal. 
NEUROLOGIC: AOx3. Gait normal. Reflexes and motor strength normal and symmetric. Cranial nerves 2-12 and sensation grossly intact. CT Results (most recent): 
Results from [facility] Encounter encounter on 08/21/20 CT ABD PELV W CONT Narrative INDICATION:  Malignant neoplasm of unspecified part of unspecified bronchus or 
lung. Small cell carcinoma of lung metastatic to liver and bone. EXAM: CT Chest, Abdomen and Pelvis. CT dose reduction was achieved through the 
use of a standardized protocol tailored for this examination and automatic 
exposure control for dose modulation. CONTRAST: 100 mL Isovue 370 IV. With oral contrast. 
 
COMPARISON: 6/10/2020. CHEST:  
The previous bulky left hilar/mediastinal mass and associated bronchial/arterial 
encasement have resolved, and there is decreased tubular fingerlike soft tissue 
extension from the hilum into the superior segment of the left lower lobe 
favoring endobronchial mucous impaction rather than residual tumor. Lungs are severely emphysematous without acute airspace disease/edema. There is no significant mediastinal or hilar adenopathy. There is no pleural or 
pericardial effusion. Aorta is not aneurysmal. Central pulmonary arteries are 
free of thrombus. There is coronary artery calcification. Visualized thyroid and 
lower neck soft tissues are unremarkable for age.  
 
ABDOMEN PELVIS: 
 There is significant decreased size and density of innumerable hepatic 
metastases, with resolution of hepatomegaly. There is significant decreased size of upper abdominal/olya hepatis adenopathy 
with no pathologic size nodes remaining. There is no inflammation, ascites, free air or bowel dilation. Bile ducts and 
spleen are not enlarged. Pancreas shows no mass or inflammation. Adrenal glands 
are normal in size. Kidneys show no mass or hydronephrosis. Aorta is calcified 
without aneurysm. The appendix is normal. 
 
The bladder is not distended. The distal ureters are not dilated. There is no 
pelvic mass. No aggressive osseous lesion/fracture or significant osseous 
change. Impression IMPRESSION:  
1. Resolved left hilar/mediastinal hilar mass. 2. Decreased IVA soft tissue extension (?benign endobronchial mucus impaction). 3. Decreased hepatic metastases. 4. Resolved upper abdominal adenopathy. I personally reviewed the images. LLL lung mass with invasion in the mediastinum. Numerous liver metastasis. Lab Results Component Value Date/Time WBC 4.1 08/31/2020 08:03 AM  
 HGB 9.7 (L) 08/31/2020 08:03 AM  
 HCT 31.0 (L) 08/31/2020 08:03 AM  
 PLATELET 748 30/59/0056 08:03 AM  
 .8 (H) 08/31/2020 08:03 AM  
 
 
Lab Results Component Value Date/Time Sodium 141 08/31/2020 08:03 AM  
 Potassium 4.1 08/31/2020 08:03 AM  
 Chloride 112 (H) 08/31/2020 08:03 AM  
 CO2 26 08/31/2020 08:03 AM  
 Anion gap 3 (L) 08/31/2020 08:03 AM  
 Glucose 92 08/31/2020 08:03 AM  
 BUN 13 08/31/2020 08:03 AM  
 Creatinine 0.50 (L) 08/31/2020 08:03 AM  
 BUN/Creatinine ratio 26 (H) 08/31/2020 08:03 AM  
 GFR est AA >60 08/31/2020 08:03 AM  
 GFR est non-AA >60 08/31/2020 08:03 AM  
 Calcium 7.9 (L) 08/31/2020 08:03 AM  
 Bilirubin, total 0.7 08/31/2020 08:03 AM  
 Alk.  phosphatase 163 (H) 08/31/2020 08:03 AM  
 Protein, total 6.6 08/31/2020 08:03 AM  
 Albumin 3.2 (L) 08/31/2020 08:03 AM  
 Globulin 3.4 08/31/2020 08:03 AM  
 A-G Ratio 0.9 (L) 08/31/2020 08:03 AM  
 ALT (SGPT) 16 08/31/2020 08:03 AM  
 
 
CT Results (most recent): 
Results from Hospital Encounter encounter on 08/21/20 CT ABD PELV W CONT Narrative INDICATION:  Malignant neoplasm of unspecified part of unspecified bronchus or 
lung. Small cell carcinoma of lung metastatic to liver and bone. EXAM: CT Chest, Abdomen and Pelvis. CT dose reduction was achieved through the 
use of a standardized protocol tailored for this examination and automatic 
exposure control for dose modulation. CONTRAST: 100 mL Isovue 370 IV. With oral contrast. 
 
COMPARISON: 6/10/2020. CHEST:  
The previous bulky left hilar/mediastinal mass and associated bronchial/arterial 
encasement have resolved, and there is decreased tubular fingerlike soft tissue 
extension from the hilum into the superior segment of the left lower lobe 
favoring endobronchial mucous impaction rather than residual tumor. Lungs are severely emphysematous without acute airspace disease/edema. There is no significant mediastinal or hilar adenopathy. There is no pleural or 
pericardial effusion. Aorta is not aneurysmal. Central pulmonary arteries are 
free of thrombus. There is coronary artery calcification. Visualized thyroid and 
lower neck soft tissues are unremarkable for age. ABDOMEN PELVIS: 
There is significant decreased size and density of innumerable hepatic 
metastases, with resolution of hepatomegaly. There is significant decreased size of upper abdominal/olya hepatis adenopathy 
with no pathologic size nodes remaining. There is no inflammation, ascites, free air or bowel dilation. Bile ducts and 
spleen are not enlarged. Pancreas shows no mass or inflammation. Adrenal glands 
are normal in size. Kidneys show no mass or hydronephrosis. Aorta is calcified 
without aneurysm.  The appendix is normal. 
 
 The bladder is not distended. The distal ureters are not dilated. There is no 
pelvic mass. No aggressive osseous lesion/fracture or significant osseous 
change. Impression IMPRESSION:  
1. Resolved left hilar/mediastinal hilar mass. 2. Decreased IVA soft tissue extension (?benign endobronchial mucus impaction). 3. Decreased hepatic metastases. 4. Resolved upper abdominal adenopathy. I personally reviewed the images. Lung mass, mediastinal nodes, disease in the liver - all improved. Assessment: 1. 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 A detailed system by system evaluation of side effect was performed to assess chemotherapy related toxicity. Blood counts are acceptable. Results reviewed with the patient CT Chest Abd Pelv (8/21/2020): Tremendous response to treatment 2. Bone metastasis Receiving zoledronic acid 3. Pedal edema - bilateral 
 
No pain or redness. Will decrease after elevation. 1+ 
Recommended compression stockings 4. Chemotherapy induced anemia Observation 5. Severe protein calorie malnutrition Dietician consultation Protein supplements Plan:  
 
 
> Continue palliative chemotherapy with carboplatin/etoposide/durvalumab for a total of 6 cycles 
> Continue zometa 
> Follow-up in 3 weeks Signed by: Tuan Reyna MD 
                   September 2, 2020 
 
 
 
CC. Cherri West MD 
CC.  Josue Bello MD

## 2020-09-02 NOTE — PROGRESS NOTES
Sharron Staley is a 65 y.o. female here for follow up for: Chief Complaint Patient presents with  Lung Cancer  
  met  Chemotherapy Cycle 4 Day 3 Etoposide Zometa every 6 weeks 1. Have you been to the ER, urgent care clinic since your last visit? Hospitalized since your last visit? no 
 
2. Have you seen or consulted any other health care providers outside of the [address] [place] [address] since your last visit? Include any pap smears or colon screening. No 
 
Pt states everything has been fine since last visit. No complaints.

## 2020-09-02 NOTE — PROGRESS NOTES
Pt arrived to [facility] ambulatory in no acute distress at 0810 for C4D3 of Etoposide.  Assessment unremarkable except for baseline dyspnea on exertion. Right chest port already accessed and positive blood return noted.  Labs done on 8/31/2020. Patient Vitals for the past 12 hrs:   Temp Pulse Resp BP SpO2   09/02/20 1054 -- 65 18 121/74 --   09/02/20 0811 98.1 °F (36.7 °C) 74 18 131/70 97 %     The following medications administered:  Medications Administered     0.9% sodium chloride infusion     Admin Date  09/02/2020 Action  New Bag Dose  25 mL/hr Rate  25 mL/hr Route  IntraVENous Administered By  Juan Carlos Hammonds          dexamethasone (DECADRON) 4 mg/mL injection 8 mg     Admin Date  09/02/2020 Action  Given Dose  8 mg Route  IntraVENous Administered By  Juan Carlos Hammonds          etoposide (VEPESID) 157 mg in 0.9% sodium chloride 500 mL, overfill volume 50 mL chemo infusion     Admin Date  09/02/2020 Action  New Bag Dose  157 mg Rate  557.9 mL/hr Route  IntraVENous Administered By  Dar Hollingsworth RN          heparin (porcine) pf 300-500 Units     Admin Date  09/02/2020 Action  Given Dose  500 Units Route  InterCATHeter Administered By  Dar Hollingsworth RN          pegfilgrastim (NEULASTA) wearable SQ injector 6 mg     Admin Date  09/02/2020 Action  Given Dose  6 mg Route  SubCUTAneous Administered By  Dar Hollingsworth RN                Pt tolerated treatment well.  Port flushed per policy, avalos needle removed, 2x2 and tape placed.  Pt discharged ambulatory in no acute distress at 1100, accompanied by self. Next appointment 9/21/2020 at 0800. Lebron Copeland (MD)  Urology  [address] [facility], [address] [place] [address]  Phone: [phone]  Fax: [phone]  Follow Up Time:

## 2020-09-02 NOTE — PROGRESS NOTES
[address]  Social Work Navigator Encounter     Patient Name:  Consuelo Haider    Medical History:  Dx metastatic lung cancer    Advance Directives:    Narrative: PT had forms for a cancer policy from [facility].  ALEXANDR printed pathology, scan, office note and provided to pt as the I could not get a successful fax confirmation. (busy or nG)   numbeR and gave to pt to communicate with company. ALEXANDR requested Viviane Birch from [employer]       Barriers to Care:     Assessment/Action:    Plan/Referral:     Other referral

## 2020-09-03 NOTE — TELEPHONE ENCOUNTER
[employer]  Social Work Navigator Encounter     Patient Name:  Hemant Robertson     Medical History:     Advance Directives:    Narrative: SW obtained UB04 that pt needs to make a claim for her cancer policy. SW left msg on her home phone. Assistance with cancer policy. SW does not have a fax number for the cancer policy. SW will wait to hear from pt.       Barriers to Care:     Assessment/Action:    Plan/Referral:   Insurance/Entitlements referral  Other referral

## 2020-09-21 NOTE — PROGRESS NOTES
Problem: Chemotherapy Treatment Goal: *Chemotherapy regimen followed Outcome: Progressing Towards Goal 
  
Problem: Infection - Risk of, Central Venous Catheter-Associated Bloodstream Infection Goal: *Absence of infection signs and symptoms Outcome: Progressing Towards Goal 
  
Problem: Patient Education:  Go to Education Activity Goal: Patient/Family Education Outcome: Progressing Towards Goal

## 2020-09-21 NOTE — PROGRESS NOTES
Pt arrived to [facility] ambulatory in no acute distress at 0805 for Carbo, Etoposide, Imfinzi C5D1.  Assessment unremarkable and no new concerns voiced. R chest port accessed without issue and positive blood return noted.  Labs obtained, CBC w/diff,, CMP. The following medications administered: 
 
Medications Administered 0.9% sodium chloride infusion Admin Date 
09/21/2020 Action New Bag Dose 25 mL/hr Rate 25 mL/hr Route IntraVENous Administered By Ginny Edwards RN  
  
  
 0.9% sodium chloride injection 10 mL Admin Date 
09/21/2020 Action Given Dose 
10 mL Route IntraVENous Administered By Ginny Edwards RN  
  
  
 CARBOplatin (PARAPLATIN) 369 mg in 0.9% sodium chloride 250 mL, overfill volume 25 mL chemo infusion Admin Date 
09/21/2020 Action New Bag Dose 
369 mg Rate 623.8 mL/hr Route IntraVENous Administered By 
Earlene Alcantar RN  
  
  
 dexamethasone (DECADRON) 12 mg in 0.9% sodium chloride 50 mL IVPB Admin Date 
09/21/2020 Action Given Dose 
12 mg Route IntraVENous Administered By 
Earlene Alcantar RN  
  
  
 durvalumab (IMFINZI) 1,500 mg in 0.9% sodium chloride 100 mL, overfill volume 10 mL IVPB Admin Date 
09/21/2020 Action New Bag Dose 
1500 mg Rate 140 mL/hr Route IntraVENous Administered By 
Earlene Alcantar RN  
  
  
 etoposide (VEPESID) 157 mg in 0.9% sodium chloride 500 mL, overfill volume 50 mL chemo infusion Admin Date 
09/21/2020 Action New Bag Dose 
157 mg Rate 
557.9 mL/hr Route IntraVENous Administered By 
Earlene Alcantar RN  
  
  
 fosaprepitant (EMEND) 150 mg in 0.9% sodium chloride 150 mL IVPB Admin Date 
09/21/2020 Action Given Dose 
150 mg Rate 450 mL/hr Route IntraVENous Administered By 
Earlene Alcantar RN  
  
  
 heparin (porcine) pf 300-500 Units Admin Date 
09/21/2020 Action Given Dose 
500 Units Route InterCATHeter Administered By Sharonda Morejon RN  
  
  
 palonosetron HCl (ALOXI) injection 0.25 mg   
 Admin Date 
09/21/2020 Action Given Dose 0.25 mg Route IntraVENous Administered By 
Eric Petersen RN  
  
  
 saline peripheral flush soln 10 mL Admin Date 
09/21/2020 Action Given Dose 
10 mL Route InterCATHeter Administered By Sara Balbuena RN Admin Date 
09/21/2020 Action Given Dose 
10 mL Route InterCATHeter Administered By Sara Balbuena RN Recent Results (from the past 12 hour(s)) CBC WITH AUTOMATED DIFF Collection Time: 09/21/20  8:29 AM  
Result Value Ref Range WBC 4.5 3.6 - 11.0 K/uL  
 RBC 2.89 (L) 3.80 - 5.20 M/uL HGB 9.7 (L) 11.5 - 16.0 g/dL HCT 30.6 (L) 35.0 - 47.0 % .9 (H) 80.0 - 99.0 FL  
 MCH 33.6 26.0 - 34.0 PG  
 MCHC 31.7 30.0 - 36.5 g/dL  
 RDW 16.6 (H) 11.5 - 14.5 % PLATELET 336 429 - 921 K/uL MPV 9.8 8.9 - 12.9 FL  
 NRBC 0.0 0  WBC ABSOLUTE NRBC 0.00 0.00 - 0.01 K/uL NEUTROPHILS 77 (H) 32 - 75 % LYMPHOCYTES 10 (L) 12 - 49 % MONOCYTES 11 5 - 13 % EOSINOPHILS 0 0 - 7 % BASOPHILS 1 0 - 1 % IMMATURE GRANULOCYTES 1 (H) 0.0 - 0.5 % ABS. NEUTROPHILS 3.5 1.8 - 8.0 K/UL  
 ABS. LYMPHOCYTES 0.5 (L) 0.8 - 3.5 K/UL  
 ABS. MONOCYTES 0.5 0.0 - 1.0 K/UL  
 ABS. EOSINOPHILS 0.0 0.0 - 0.4 K/UL  
 ABS. BASOPHILS 0.0 0.0 - 0.1 K/UL  
 ABS. IMM. GRANS. 0.0 0.00 - 0.04 K/UL  
 DF SMEAR SCANNED    
 RBC COMMENTS ANISOCYTOSIS 
1+ METABOLIC PANEL, COMPREHENSIVE Collection Time: 09/21/20  8:29 AM  
Result Value Ref Range Sodium 141 136 - 145 mmol/L Potassium 3.6 3.5 - 5.1 mmol/L Chloride 110 (H) 97 - 108 mmol/L  
 CO2 26 21 - 32 mmol/L Anion gap 5 5 - 15 mmol/L Glucose 91 65 - 100 mg/dL BUN 14 6 - 20 MG/DL Creatinine 0.61 0.55 - 1.02 MG/DL  
 BUN/Creatinine ratio 23 (H) 12 - 20 GFR est AA >60 >60 ml/min/1.73m2 GFR est non-AA >60 >60 ml/min/1.73m2  Calcium 7.4 (L) 8.5 - 10.1 MG/DL  
 Bilirubin, total 0.6 0.2 - 1.0 MG/DL  
 ALT (SGPT) 18 12 - 78 U/L  
 AST (SGOT) 18 15 - 37 U/L Alk. phosphatase 124 (H) 45 - 117 U/L Protein, total 6.4 6.4 - 8.2 g/dL Albumin 3.3 (L) 3.5 - 5.0 g/dL Globulin 3.1 2.0 - 4.0 g/dL A-G Ratio 1.1 1.1 - 2.2 Patient Vitals for the past 12 hrs: 
 Temp Pulse Resp BP  
09/21/20 1409  84 16 118/64  
09/21/20 0808 97.8 °F (36.6 °C) 85 16 134/76 Pt tolerated treatment well.  Port flushed per policy, secured for tomorrow's treatment.  Pt discharged ambulatory in no acute distress at 1410, accompanied by self. Next appointment 9/22 @ 0800.

## 2020-09-22 NOTE — PROGRESS NOTES
Outpatient Infusion Center - Chemotherapy Progress Note 
 
0805 - Pt admit to [facility] for C5D2 Etoposide in stable condition. Assessment completed, pt reports no new concerns. R chest PAC remains accessed from treatment yesterday with positive blood return. Line flushed, NS hung to [facility]. Chemotherapy Flowsheet 9/22/2020 Cycle C5D2 Date 9/22/2020 Drug / Regimen Etoposide Pre Meds given Notes given Visit Vitals BP (!) 154/68 (BP 1 Location: Left arm, BP Patient Position: Sitting) Pulse 87 Temp 97.8 °F (36.6 °C) Resp 18 Ht 5' 3\" (1.6 m) Wt 50.1 kg (110 lb 6.4 oz) SpO2 97% BMI 19.56 kg/m² Medications: 
NS [facility] Decadron 8mg IVP Etoposide 157mg over 60min Pt tolerated treatment well. Port maintained positive blood return throughout treatment, flushed with positive blood return at conclusion, and kept accessed, secured for tomorrow's treatment. 1015 - D/c home in no distress. Pt aware of next [facility] appointment scheduled for: 9/23/20 Future Appointments Date Time Provider Abbie Rosario 9/22/2020  8:00 AM CHAIR 3 [facility] REG  
9/23/2020  9:00 AM [place] INFUSION NURSE 1 [facility]  
9/23/2020  9:30 AM Chinyere Kirkland NP ONCMR BS AMB  
10/12/2020  8:00 AM [facility] CHAIR 2 [facility] REG  
10/13/2020  8:00 AM [place] INFUSION NURSE 1 [facility] REG  
10/14/2020  8:00 AM [place] INFUSION NURSE 1 [facility] REG  
11/2/2020  8:00 AM CHAIR 2 [facility]  
11/30/2020  8:00 AM CHAIR 2 [facility]

## 2020-09-23 NOTE — PROGRESS NOTES
[address] [address], [facility], [address] 
[phone] Oncology Follow Up Note Patient: Stephanie Valle MRN: [id number]  SSN: [id number] YOB: 1950  Age: 65 y.o. Sex: female Diagnosis 1. 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 Palliative chemotherapy with Carboplatin/etoposide/durvalumab, Cycle 5 Day 3 Subjective:  
  
Stephanie Valle is a 65 y.o. 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 She has been experiencing mild dyspnea for over 2 years. Occasional cough, no phlegm or hemoptysis. She has some pain and discomfort in the left chest wall and RUQ. The pain is not bad and she does not take medicines. She is also having insomnia. She works for [email or web address] [address] [place] [address]. She was seen in the ED. CT showed a mass in the LLL invading into the mediastinum and hepatic metastasis. She denies headache or bone pains. CT guided bx established the diagnosis. Bone scan shows widespread bony metastasis. Ms. Tahira Isaac is receiving palliative chemotherapy. She is doing extremely well and states the bone pain in the left rib area has resolved. Recent scans show good response to treatment. Review of Systems: 
 
Constitutional: negative Eyes: negative Ears, Nose, Mouth, Throat, and Face: negative Respiratory: negative Cardiovascular: negative Gastrointestinal: negative Genitourinary:negative Integument/Breast: negative Hematologic/Lymphatic: negative Musculoskeletal:negative Neurological: negative Past Medical History:  
Diagnosis Date  CAD (coronary artery disease)  Cancer ([facility] [address] lung Past Surgical History:  
Procedure Laterality Date  HX HEART CATHETERIZATION    
  HX HYSTERECTOMY  IR BX TRANSCATHETER  6/22/2020  IR INSERT TUNL CVC W PORT OVER 5 YEARS  6/22/2020 Family History Problem Relation Age of Onset  Heart Disease Father  Cancer Sister Social History Tobacco Use  Smoking status: Former Smoker Last attempt to quit: 2017 Years since quitting: 3.7  Smokeless tobacco: Never Used  Tobacco comment: 1 per week as of 2017 Substance Use Topics  Alcohol use: Never Frequency: Never Prior to Admission medications Medication Sig Start Date End Date Taking? Authorizing Provider  
lidocaine-prilocaine (EMLA) topical cream Apply  to affected area as needed for Pain. 6/24/20  Yes Ludivina Hopkins MD  
ondansetron (ZOFRAN ODT) 4 mg disintegrating tablet Take 1 Tab by mouth every eight (8) hours as needed for Nausea or Vomiting. 6/24/20  Yes Ludivina Hopkins MD  
prochlorperazine (COMPAZINE) 10 mg tablet Take 0.5 Tabs by mouth every six (6) hours as needed for Nausea. 6/24/20  Yes Ludivina Hopkins MD  
budesonide/formoterol fumarate (SYMBICORT IN) Take  by inhalation. Yes Provider, Historical  
clopidogrel (PLAVIX) 75 mg tab Take 1 Tab by mouth daily. 7/25/19  Yes Estefany Camacho MD  
diphenhydrAMINE (BENADRYL) 25 mg capsule Take 1 Cap by mouth every six (6) hours as needed for Itching. 7/24/19  Yes Estefany Camacho MD  
aspirin delayed-release 81 mg tablet Take 81 mg by mouth daily. Yes Provider, Historical  
cholecalciferol (VITAMIN D3) 1,000 unit cap Take  by mouth daily. Yes Provider, Historical  
calcium carbonate (TUMS) 200 mg calcium (500 mg) chew Take 1 Tab by mouth daily. Yes Provider, Historical  
  
 
 
 
 
Allergies Allergen Reactions  Codeine Itching Objective:  
 
Vitals:  
 09/23/20 4227 BP: (!) 154/79 Pulse: 72 Resp: 18 Temp: 97 °F (36.1 °C) SpO2: 100% Weight: 111 lb (50.3 kg) Height: 5' 3\" (1.6 m) Pain Scale: 0 - No pain/10 Physical Exam: GENERAL: alert, cooperative, no distress, appears stated age EYE: conjunctivae/corneas clear. PERRL, EOM's intact LYMPHATIC: Cervical, supraclavicular, and axillary nodes normal.  
THROAT & NECK: normal and no erythema or exudates noted. LUNG: clear to auscultation bilaterally HEART: regular rate and rhythm, S1, S2 normal, no murmur, click, rub or gallop ABDOMEN: soft, non-tender. Bowel sounds normal. No masses,  no organomegaly EXTREMITIES:  extremities normal, atraumatic, no cyanosis or edema SKIN: Normal. 
NEUROLOGIC: AOx3. Gait normal. Reflexes and motor strength normal and symmetric. Cranial nerves 2-12 and sensation grossly intact. CT Results (most recent): 
Results from [facility] Encounter encounter on 08/21/20 CT ABD PELV W CONT Narrative INDICATION:  Malignant neoplasm of unspecified part of unspecified bronchus or 
lung. Small cell carcinoma of lung metastatic to liver and bone. EXAM: CT Chest, Abdomen and Pelvis. CT dose reduction was achieved through the 
use of a standardized protocol tailored for this examination and automatic 
exposure control for dose modulation. CONTRAST: 100 mL Isovue 370 IV. With oral contrast. 
 
COMPARISON: 6/10/2020. CHEST:  
The previous bulky left hilar/mediastinal mass and associated bronchial/arterial 
encasement have resolved, and there is decreased tubular fingerlike soft tissue 
extension from the hilum into the superior segment of the left lower lobe 
favoring endobronchial mucous impaction rather than residual tumor. Lungs are severely emphysematous without acute airspace disease/edema. There is no significant mediastinal or hilar adenopathy. There is no pleural or 
pericardial effusion. Aorta is not aneurysmal. Central pulmonary arteries are 
free of thrombus. There is coronary artery calcification. Visualized thyroid and 
lower neck soft tissues are unremarkable for age.  
 
ABDOMEN PELVIS: 
 There is significant decreased size and density of innumerable hepatic 
metastases, with resolution of hepatomegaly. There is significant decreased size of upper abdominal/olya hepatis adenopathy 
with no pathologic size nodes remaining. There is no inflammation, ascites, free air or bowel dilation. Bile ducts and 
spleen are not enlarged. Pancreas shows no mass or inflammation. Adrenal glands 
are normal in size. Kidneys show no mass or hydronephrosis. Aorta is calcified 
without aneurysm. The appendix is normal. 
 
The bladder is not distended. The distal ureters are not dilated. There is no 
pelvic mass. No aggressive osseous lesion/fracture or significant osseous 
change. Impression IMPRESSION:  
1. Resolved left hilar/mediastinal hilar mass. 2. Decreased IVA soft tissue extension (?benign endobronchial mucus impaction). 3. Decreased hepatic metastases. 4. Resolved upper abdominal adenopathy. I personally reviewed the images. LLL lung mass with invasion in the mediastinum. Numerous liver metastasis. Lab Results Component Value Date/Time WBC 4.5 09/21/2020 08:29 AM  
 HGB 9.7 (L) 09/21/2020 08:29 AM  
 HCT 30.6 (L) 09/21/2020 08:29 AM  
 PLATELET 162 89/39/9223 08:29 AM  
 .9 (H) 09/21/2020 08:29 AM  
 
 
Lab Results Component Value Date/Time Sodium 141 09/21/2020 08:29 AM  
 Potassium 3.6 09/21/2020 08:29 AM  
 Chloride 110 (H) 09/21/2020 08:29 AM  
 CO2 26 09/21/2020 08:29 AM  
 Anion gap 5 09/21/2020 08:29 AM  
 Glucose 91 09/21/2020 08:29 AM  
 BUN 14 09/21/2020 08:29 AM  
 Creatinine 0.61 09/21/2020 08:29 AM  
 BUN/Creatinine ratio 23 (H) 09/21/2020 08:29 AM  
 GFR est AA >60 09/21/2020 08:29 AM  
 GFR est non-AA >60 09/21/2020 08:29 AM  
 Calcium 7.4 (L) 09/21/2020 08:29 AM  
 Bilirubin, total 0.6 09/21/2020 08:29 AM  
 Alk.  phosphatase 124 (H) 09/21/2020 08:29 AM  
 Protein, total 6.4 09/21/2020 08:29 AM  
 Albumin 3.3 (L) 09/21/2020 08:29 AM  
 Globulin 3.1 09/21/2020 08:29 AM  
 A-G Ratio 1.1 09/21/2020 08:29 AM  
 ALT (SGPT) 18 09/21/2020 08:29 AM  
 
 
CT Results (most recent): 
Results from Hospital Encounter encounter on 08/21/20 CT ABD PELV W CONT Narrative INDICATION:  Malignant neoplasm of unspecified part of unspecified bronchus or 
lung. Small cell carcinoma of lung metastatic to liver and bone. EXAM: CT Chest, Abdomen and Pelvis. CT dose reduction was achieved through the 
use of a standardized protocol tailored for this examination and automatic 
exposure control for dose modulation. CONTRAST: 100 mL Isovue 370 IV. With oral contrast. 
 
COMPARISON: 6/10/2020. CHEST:  
The previous bulky left hilar/mediastinal mass and associated bronchial/arterial 
encasement have resolved, and there is decreased tubular fingerlike soft tissue 
extension from the hilum into the superior segment of the left lower lobe 
favoring endobronchial mucous impaction rather than residual tumor. Lungs are severely emphysematous without acute airspace disease/edema. There is no significant mediastinal or hilar adenopathy. There is no pleural or 
pericardial effusion. Aorta is not aneurysmal. Central pulmonary arteries are 
free of thrombus. There is coronary artery calcification. Visualized thyroid and 
lower neck soft tissues are unremarkable for age. ABDOMEN PELVIS: 
There is significant decreased size and density of innumerable hepatic 
metastases, with resolution of hepatomegaly. There is significant decreased size of upper abdominal/olya hepatis adenopathy 
with no pathologic size nodes remaining. There is no inflammation, ascites, free air or bowel dilation. Bile ducts and 
spleen are not enlarged. Pancreas shows no mass or inflammation. Adrenal glands 
are normal in size. Kidneys show no mass or hydronephrosis. Aorta is calcified 
without aneurysm.  The appendix is normal. 
 
 The bladder is not distended. The distal ureters are not dilated. There is no 
pelvic mass. No aggressive osseous lesion/fracture or significant osseous 
change. Impression IMPRESSION:  
1. Resolved left hilar/mediastinal hilar mass. 2. Decreased IVA soft tissue extension (?benign endobronchial mucus impaction). 3. Decreased hepatic metastases. 4. Resolved upper abdominal adenopathy. I personally reviewed the images. Lung mass, mediastinal nodes, disease in the liver - all improved. Assessment: 1. 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 A detailed system by system evaluation of side effect was performed to assess chemotherapy related toxicity. Blood counts are acceptable. Results reviewed with the patient CT Chest Abd Pelv (8/21/2020): Tremendous response to treatment 2. Bone metastasis Receiving zoledronic acid 3. Pedal edema - bilateral 
 
No pain or redness. Will decrease after elevation. 1+ 
Recommended compression stockings 4. Chemotherapy induced anemia Observation 5. Severe protein calorie malnutrition Dietician consultation Protein supplements Plan:  
 
 
> Continue palliative chemotherapy with carboplatin/etoposide/durvalumab for a total of 6 cycles 
> Continue zometa every 4 weeks 
> Follow-up in 3 weeks I saw the patient in conjunction with PRASAD Galloway. Signed by: Roldan Prince MD 
                   September 23, 2020 
 
 
 
CC. Mauro Jeffries MD 
CC.  Chris Stevens MD

## 2020-09-23 NOTE — PROGRESS NOTES
Pt arrived to [facility] ambulatory in no acute distress at 0845 for C5D3 of Etoposide.  Assessment unremarkable except for constipation in which pt has medication for. R chest port flushed and  positive blood return noted. No labs drawn. Patient Vitals for the past 12 hrs: 
 Temp Pulse Resp BP SpO2  
09/23/20 1122  72 18 136/80 100 % 09/23/20 0845 97 °F (36.1 °C) 72 18 (!) 154/79 100 % The following medications administered: 
Medications Administered 0.9% sodium chloride infusion Admin Date 
09/23/2020 Action New Bag Dose 25 mL/hr Rate 25 mL/hr Route IntraVENous Administered By Leslye Muñoz  
  
  
 dexamethasone (DECADRON) 4 mg/mL injection 8 mg Admin Date 
09/23/2020 Action Given Dose 8 mg Route IntraVENous Administered By Leslye Muñoz  
  
  
 etoposide (VEPESID) 157 mg in 0.9% sodium chloride 500 mL, overfill volume 50 mL chemo infusion Admin Date 
09/23/2020 Action New Bag Dose 
157 mg Rate 
557.9 mL/hr Route IntraVENous Administered By 
Josy Norton RN  
  
  
 heparin (porcine) pf 300-500 Units Admin Date 
09/23/2020 Action Given Dose 
500 Units Route InterCATHeter Administered By Leslye Muñoz  
  
  
 pegfilgrastim (NEULASTA) wearable SQ injector 6 mg Admin Date 
09/23/2020 Action Given Dose 6 mg Route SubCUTAneous Administered By 
Josy Norton RN  
  
  
  
 
 
Pt tolerated treatment well.  Port flushed per policy and removed, 2x2 and tape placed.  Pt discharged ambulatory in no acute distress at 1130, accompanied by self. Next appointment 10/12/2020 at 0800.

## 2020-09-23 NOTE — PROGRESS NOTES
Mary Diaz is a 65 y.o. female here for follow up for: Chief Complaint Patient presents with  Lung Cancer  
  met  Chemotherapy Cycle 5 Day 3 Etoposide *Zometa every 6 weeks 1. Have you been to the ER, urgent care clinic since your last visit? Hospitalized since your last visit? no 
 
2. Have you seen or consulted any other health care providers outside of the [address] [place] [address] since your last visit? Include any pap smears or colon screening. no 
 
Pt states everything has been good since last visit. No complaints.

## 2020-10-12 NOTE — PROGRESS NOTES
Pt arrived to [facility] ambulatory in no acute distress at 0805 for Imfinzi/Carbo/Etoposide C6D1.  Assessment unremarkable. R chest port accessed without issue and positive blood return noted.  Labs obtained, CBC, CMP. Visit Vitals BP (!) 141/83 (BP 1 Location: Left arm, BP Patient Position: Sitting) Pulse 96 Temp 97.7 °F (36.5 °C) Resp 18 Ht 5' 3\" (1.6 m) Wt 49.8 kg (109 lb 11.2 oz) SpO2 98% BMI 19.43 kg/m² Recent Results (from the past 12 hour(s)) CBC WITH AUTOMATED DIFF Collection Time: 10/12/20  8:09 AM  
Result Value Ref Range WBC 5.1 3.6 - 11.0 K/uL  
 RBC 3.23 (L) 3.80 - 5.20 M/uL  
 HGB 10.8 (L) 11.5 - 16.0 g/dL HCT 33.9 (L) 35.0 - 47.0 % .0 (H) 80.0 - 99.0 FL  
 MCH 33.4 26.0 - 34.0 PG  
 MCHC 31.9 30.0 - 36.5 g/dL  
 RDW 15.7 (H) 11.5 - 14.5 % PLATELET 504 432 - 737 K/uL MPV 10.1 8.9 - 12.9 FL  
 NRBC 0.0 0  WBC ABSOLUTE NRBC 0.00 0.00 - 0.01 K/uL NEUTROPHILS 74 32 - 75 % LYMPHOCYTES 13 12 - 49 % MONOCYTES 11 5 - 13 % EOSINOPHILS 0 0 - 7 % BASOPHILS 1 0 - 1 % IMMATURE GRANULOCYTES 1 (H) 0.0 - 0.5 % ABS. NEUTROPHILS 3.6 1.8 - 8.0 K/UL  
 ABS. LYMPHOCYTES 0.7 (L) 0.8 - 3.5 K/UL  
 ABS. MONOCYTES 0.6 0.0 - 1.0 K/UL  
 ABS. EOSINOPHILS 0.0 0.0 - 0.4 K/UL  
 ABS. BASOPHILS 0.1 0.0 - 0.1 K/UL  
 ABS. IMM. GRANS. 0.1 (H) 0.00 - 0.04 K/UL  
 DF SMEAR SCANNED    
 RBC COMMENTS ANISOCYTOSIS 1+ 
    
 RBC COMMENTS MACROCYTOSIS 
1+ METABOLIC PANEL, COMPREHENSIVE Collection Time: 10/12/20  8:09 AM  
Result Value Ref Range Sodium 142 136 - 145 mmol/L Potassium 3.6 3.5 - 5.1 mmol/L Chloride 108 97 - 108 mmol/L  
 CO2 26 21 - 32 mmol/L Anion gap 8 5 - 15 mmol/L Glucose 95 65 - 100 mg/dL BUN 17 6 - 20 MG/DL Creatinine 0.58 0.55 - 1.02 MG/DL  
 BUN/Creatinine ratio 29 (H) 12 - 20 GFR est AA >60 >60 ml/min/1.73m2 GFR est non-AA >60 >60 ml/min/1.73m2  Calcium 8.4 (L) 8.5 - 10.1 MG/DL  
 Bilirubin, total 0.4 0.2 - 1.0 MG/DL  
 ALT (SGPT) 20 12 - 78 U/L  
 AST (SGOT) 19 15 - 37 U/L Alk. phosphatase 114 45 - 117 U/L Protein, total 6.7 6.4 - 8.2 g/dL Albumin 3.5 3.5 - 5.0 g/dL Globulin 3.2 2.0 - 4.0 g/dL A-G Ratio 1.1 1.1 - 2.2 The following medications administered: 
Joanne@[facility] [place] IVP Emend IV Decadron IV Imfinzi IV Carbo IV Etoposide IV Visit Vitals /69 Pulse 90 Temp 97.7 °F (36.5 °C) Resp 18 Ht 5' 3\" (1.6 m) Wt 49.8 kg (109 lb 11.2 oz) SpO2 98% BMI 19.43 kg/m² Pt tolerated treatment well. Port flushed per policy, needle intact, curos cap placed.  Pt discharged ambulatory in no acute distress at 1415, accompanied by self. Next appointment 10/13/20 at 0800.

## 2020-10-13 NOTE — PROGRESS NOTES
[address] [facility], [address] 
[phone] Oncology Follow Up Note Patient: Ludy Alexis MRN: [id number]  SSN: [id number] YOB: 1950  Age: 65 y.o. Sex: female Diagnosis 1. 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 Palliative chemotherapy Carboplatin/etoposide/durvalumab - Cycle 6 Day 3 Subjective:  
  
Ludy Alexis is a 65 y.o. 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 She has been experiencing mild dyspnea for over 2 years. Occasional cough, no phlegm or hemoptysis. She has some pain and discomfort in the left chest wall and RUQ. The pain is not bad and she does not take medicines. She is also having insomnia. She works for [employer] [address] [place] [address]. She was seen in the ED. CT showed a mass in the LLL invading into the mediastinum and hepatic metastasis. She denies headache or bone pains. CT guided bx established the diagnosis. Bone scan shows widespread bony metastasis. Ms. Ana María Serrano is receiving palliative chemotherapy. She is doing extremely well and states the bone pain in the left rib area has resolved. Recent scans show good response to treatment. After today, she will continue with single agent durvalumab. She notes discomfort for a day following neulasta but otherwise has no new complaints. Review of Systems: 
 
Constitutional: negative Eyes: negative Ears, Nose, Mouth, Throat, and Face: negative Respiratory: negative Cardiovascular: negative Gastrointestinal: negative Genitourinary:negative Integument/Breast: negative Hematologic/Lymphatic: negative Musculoskeletal:negative Neurological: negative Past Medical History:  
Diagnosis Date  CAD (coronary artery disease)  Cancer ([facility] [address] lung Past Surgical History:  
Procedure Laterality Date  HX HEART CATHETERIZATION    
 HX HYSTERECTOMY  IR BX TRANSCATHETER  6/22/2020  IR INSERT TUNL CVC W PORT OVER 5 YEARS  6/22/2020 Family History Problem Relation Age of Onset  Heart Disease Father  Cancer Sister Social History Tobacco Use  Smoking status: Former Smoker Last attempt to quit: 2017 Years since quitting: 3.7  Smokeless tobacco: Never Used  Tobacco comment: 1 per week as of 2017 Substance Use Topics  Alcohol use: Never Frequency: Never Prior to Admission medications Medication Sig Start Date End Date Taking? Authorizing Provider  
lidocaine-prilocaine (EMLA) topical cream Apply  to affected area as needed for Pain. 6/24/20  Yes Crow Matt MD  
ondansetron (ZOFRAN ODT) 4 mg disintegrating tablet Take 1 Tab by mouth every eight (8) hours as needed for Nausea or Vomiting. 6/24/20  Yes Crow Matt MD  
prochlorperazine (COMPAZINE) 10 mg tablet Take 0.5 Tabs by mouth every six (6) hours as needed for Nausea. 6/24/20  Yes Crow Matt MD  
budesonide/formoterol fumarate (SYMBICORT IN) Take  by inhalation. Yes Provider, Historical  
aspirin delayed-release 81 mg tablet Take 81 mg by mouth daily. Yes Provider, Historical  
cholecalciferol (VITAMIN D3) 1,000 unit cap Take  by mouth daily. Yes Provider, Historical  
calcium carbonate (TUMS) 200 mg calcium (500 mg) chew Take 1 Tab by mouth daily. Yes Provider, Historical  
clopidogrel (PLAVIX) 75 mg tab Take 1 Tab by mouth daily. 7/25/19   Niall Tejeda MD  
diphenhydrAMINE (BENADRYL) 25 mg capsule Take 1 Cap by mouth every six (6) hours as needed for Itching. 7/24/19   Jiang Client Evelyn Ro MD  
  
 
 
 
 
Allergies Allergen Reactions  Codeine Itching Objective:  
 
Visit Vitals /63 Pulse 92 Temp 97.7 °F (36.5 °C) Resp 18 Ht 5' 3\" (1.6 m) Wt 111 lb (50.3 kg) SpO2 96% BMI 19.66 kg/m² Pain Scale: 0 - No pain/10 Pain Location:  
 
 
Physical Exam: 
 
GENERAL: alert, cooperative, no distress, appears stated age EYE: conjunctivae/corneas clear. PERRL, EOM's intact LYMPHATIC: Cervical, supraclavicular, and axillary nodes normal.  
THROAT & NECK: normal and no erythema or exudates noted. LUNG: clear to auscultation bilaterally HEART: regular rate and rhythm, S1, S2 normal, no murmur, click, rub or gallop ABDOMEN: soft, non-tender. Bowel sounds normal. No masses,  no organomegaly EXTREMITIES:  extremities normal, atraumatic, no cyanosis or edema SKIN: Normal. 
NEUROLOGIC: AOx3. Gait normal. Reflexes and motor strength normal and symmetric. Cranial nerves 2-12 and sensation grossly intact. CT Results (most recent): 
Results from [facility] Encounter encounter on 08/21/20 CT ABD PELV W CONT Narrative INDICATION:  Malignant neoplasm of unspecified part of unspecified bronchus or 
lung. Small cell carcinoma of lung metastatic to liver and bone. EXAM: CT Chest, Abdomen and Pelvis. CT dose reduction was achieved through the 
use of a standardized protocol tailored for this examination and automatic 
exposure control for dose modulation. CONTRAST: 100 mL Isovue 370 IV. With oral contrast. 
 
COMPARISON: 6/10/2020. CHEST:  
The previous bulky left hilar/mediastinal mass and associated bronchial/arterial 
encasement have resolved, and there is decreased tubular fingerlike soft tissue 
extension from the hilum into the superior segment of the left lower lobe 
favoring endobronchial mucous impaction rather than residual tumor. Lungs are severely emphysematous without acute airspace disease/edema. There is no significant mediastinal or hilar adenopathy. There is no pleural or 
pericardial effusion.  Aorta is not aneurysmal. Central pulmonary arteries are 
 free of thrombus. There is coronary artery calcification. Visualized thyroid and 
lower neck soft tissues are unremarkable for age. ABDOMEN PELVIS: 
There is significant decreased size and density of innumerable hepatic 
metastases, with resolution of hepatomegaly. There is significant decreased size of upper abdominal/olya hepatis adenopathy 
with no pathologic size nodes remaining. There is no inflammation, ascites, free air or bowel dilation. Bile ducts and 
spleen are not enlarged. Pancreas shows no mass or inflammation. Adrenal glands 
are normal in size. Kidneys show no mass or hydronephrosis. Aorta is calcified 
without aneurysm. The appendix is normal. 
 
The bladder is not distended. The distal ureters are not dilated. There is no 
pelvic mass. No aggressive osseous lesion/fracture or significant osseous 
change. Impression IMPRESSION:  
1. Resolved left hilar/mediastinal hilar mass. 2. Decreased IVA soft tissue extension (?benign endobronchial mucus impaction). 3. Decreased hepatic metastases. 4. Resolved upper abdominal adenopathy. I personally reviewed the images. Tremendous improvement in all sites including lung, liver etc.  
 
 
Lab Results Component Value Date/Time WBC 5.1 10/12/2020 08:09 AM  
 HGB 10.8 (L) 10/12/2020 08:09 AM  
 HCT 33.9 (L) 10/12/2020 08:09 AM  
 PLATELET 202 54/56/7878 08:09 AM  
 .0 (H) 10/12/2020 08:09 AM  
 
 
Lab Results Component Value Date/Time  Sodium 142 10/12/2020 08:09 AM  
 Potassium 3.6 10/12/2020 08:09 AM  
 Chloride 108 10/12/2020 08:09 AM  
 CO2 26 10/12/2020 08:09 AM  
 Anion gap 8 10/12/2020 08:09 AM  
 Glucose 95 10/12/2020 08:09 AM  
 BUN 17 10/12/2020 08:09 AM  
 Creatinine 0.58 10/12/2020 08:09 AM  
 BUN/Creatinine ratio 29 (H) 10/12/2020 08:09 AM  
 GFR est AA >60 10/12/2020 08:09 AM  
 GFR est non-AA >60 10/12/2020 08:09 AM  
 Calcium 8.4 (L) 10/12/2020 08:09 AM  
 Bilirubin, total 0.4 10/12/2020 08:09 AM  
 Alk. phosphatase 114 10/12/2020 08:09 AM  
 Protein, total 6.7 10/12/2020 08:09 AM  
 Albumin 3.5 10/12/2020 08:09 AM  
 Globulin 3.2 10/12/2020 08:09 AM  
 A-G Ratio 1.1 10/12/2020 08:09 AM  
 ALT (SGPT) 20 10/12/2020 08:09 AM  
 
 
 
Assessment: 1. 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 A detailed system by system evaluation of side effect was performed to assess chemotherapy related toxicity. Blood counts are acceptable. Results reviewed with the patient CT Chest Abd Pelv (8/21/2020): Tremendous response to treatment 2. Bone metastasis Receiving zoledronic acid 3. Pedal edema - bilateral 
 
No pain or redness. Will decrease after elevation. 1+ 
Recommended compression stockings 4. Chemotherapy induced anemia Observation 5. Severe protein calorie malnutrition Dietician consultation Protein supplements Plan:  
 
 
> Continue palliative chemotherapy with carboplatin/etoposide/durvalumab - last cycle today 
> Continue with durvalumab monotherapy 
> Continue zometa every 4 weeks 
> Follow-up in 3 weeks I saw the patient in conjunction with Orin Portillo NP Signed by: Ceci Schwartz MD 
                   October 14, 2020 
 
 
CC. Sonia Kolb MD 
CC.  Tom Santoyo MD

## 2020-10-13 NOTE — PROGRESS NOTES
[facility] VISIT NOTE 
 
0800 Pt arrived at [facility] ambulatory and in no distress for C6D2 Etoposide + Zometa. Assessment completed, pt denies any changes since yesterday. Blood pressure 130/63, pulse 74, temperature 97.8 °F (36.6 °C), resp. rate 16, height 5' 3\" (1.6 m), weight 50.8 kg (112 lb). Right chest port remains accessed and flushes without difficulty. Positive blood return noted. Labs from yesterday are within treatment parameters. CrCl = 70 and corrected calcium = 8.8 Medications received: 
NS IV @ kvo Dexamethasone IVP Zometa IV 4mg Etoposide IV Blood pressure 126/73, pulse 71, temperature 97.8 °F (36.6 °C), resp. rate 16, height 5' 3\" (1.6 m), weight 50.8 kg (112 lb). Tolerated treatment well, no adverse reaction noted. Port flushed and secured for treatment tomorrow. Positive blood return noted. [address] D/C'd from [facility] ambulatory and in no distress.  Next appointment is 10/14/20 at 8am.

## 2020-10-14 NOTE — LETTER
10/14/20 Patient: Christianne Maciel YOB: 1950 Date of Visit: 10/14/2020 [address] [id number] VIA Facsimile: [phone] Dear Malik Cuello MD, Thank you for referring Ms. Sarita Martinez to [address] [place] [address] for evaluation. My notes for this consultation are attached. If you have questions, please do not hesitate to call me. I look forward to following your patient along with you.  
 
 
Sincerely, 
 
Betzy Parikh NP

## 2020-10-14 NOTE — PROGRESS NOTES
Shilpa Rao is a 65 y.o. female here for follow up for: Chief Complaint Patient presents with  Lung Cancer  
  met  Chemotherapy Cycle 6 Day 3 Etoposide Zometa every 6 weeks 1. Have you been to the ER, urgent care clinic since your last visit? Hospitalized since your last visit? no 
 
2. Have you seen or consulted any other health care providers outside of the [address] [place] [address] since your last visit? Include any pap smears or colon screening. no She is wondering if the steroids are starting to get to her. She was a little shaky on Monday and some today. She does not like the Neulasta. Makes her hurt. Otherwise doing well.

## 2020-10-14 NOTE — PROGRESS NOTES
[facility] - Chemotherapy Progress Note    0810 - Pt admit to [facility] for C6D3 Etoposide in stable condition. Assessment completed, pt reports feeling more \"jumpy\" than usual today. R chest port remains accessed from yesterday's treatment, with positive blood return and flushes easily. Chemotherapy Flowsheet 10/14/2020   Cycle C6D3   Date 10/14/2020   Drug / Regimen Etoposide   Pre Meds given   Notes given        Patient Vitals for the past 12 hrs:   Temp Pulse Resp BP SpO2   10/14/20 1055 -- 69 -- 136/77 99 %   10/14/20 0821 -- -- -- 133/63 --   10/14/20 0815 97.7 °F (36.5 °C) 92 18 (!) 167/77 96 %          Medications:  Medications Administered     0.9% sodium chloride infusion     Admin Date  10/14/2020 Action  New Bag Dose  25 mL/hr Rate  25 mL/hr Route  IntraVENous Administered By  Xu Gould          etoposide (VEPESID) 157 mg in 0.9% sodium chloride 500 mL, overfill volume 50 mL chemo infusion     Admin Date  10/14/2020 Action  New Bag Dose  157 mg Rate  557.9 mL/hr Route  IntraVENous Administered By  Xu Gould          heparin (porcine) pf 300-500 Units     Admin Date  10/14/2020 Action  Given Dose  500 Units Route  InterCATHeter Administered By  Xu Gould          [facility]etron [facility]) injection 8 mg     Admin Date  10/14/2020 Action  Given Dose  8 mg Route  IntraVENous Administered By  Xu Gould          pegfilgrastim (NEULASTA) wearable SQ injector 6 mg     Admin Date  10/14/2020 Action  Given Dose  6 mg Route  SubCUTAneous Administered By  Xu Gould          saline peripheral flush soln 10 mL     Admin Date  10/14/2020 Action  Given Dose  40 mL Route  InterCATHeter Administered By  Xu Gould                 Dex DCd and Zofran to be used as premed. Pt tolerated treatment well. Port maintained positive blood return throughout treatment, flushed with positive blood return at conclusion, and de-accessed. 1100 - D/c home in no distress.  Pt aware of next [facility] appointment scheduled for: 11/2/2020 at 0800.      Future Appointments   Date Time Provider Abbie Rosario   11/2/2020  8:00 AM CHAIR 2 [address] REG   11/2/2020  8:45 AM Yair Krueger NP ONC BS AMB   12/2/2020  8:00 AM CHAIR 2 [facility] REG   12/30/2020  8:00 AM CHAIR 2 [facility] REG

## 2020-10-30 NOTE — PROGRESS NOTES
[address] [place] [address], [facility], [address] 
[phone] Oncology Follow Up Note Patient: Carlos Guerrero MRN: [id number]  SSN: [id number] YOB: 1950  Age: 65 y.o. Sex: female Diagnosis 1. 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 Palliative chemotherapy Carboplatin/etoposide/durvalumab - s/p 6 cycles Durvalumab monotherapy - Cycle 1 Day 1 Subjective:  
  
Carlos Guerrero is a 65 y.o. 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 She has been experiencing mild dyspnea for over 2 years. Occasional cough, no phlegm or hemoptysis. She has some pain and discomfort in the left chest wall and RUQ. The pain is not bad and she does not take medicines. She is also having insomnia. She works for [employer] [address] [place] [address]. She was seen in the ED. CT showed a mass in the LLL invading into the mediastinum and hepatic metastasis. She denies headache or bone pains. CT guided bx established the diagnosis. Bone scan shows widespread bony metastasis. Ms. Irish Calix is receiving palliative chemotherapy. She is doing extremely well and states the bone pain in the left rib area has resolved. She is continuing with single agent durvalumab. Review of Systems: 
 
Constitutional: negative Eyes: negative Ears, Nose, Mouth, Throat, and Face: negative Respiratory: negative Cardiovascular: negative Gastrointestinal: negative Genitourinary:negative Integument/Breast: negative Hematologic/Lymphatic: negative Musculoskeletal:negative Neurological: negative Past Medical History:  
Diagnosis Date  CAD (coronary artery disease)  Cancer ([address] lung Past Surgical History:  
Procedure Laterality Date  HX HEART CATHETERIZATION    
 HX HYSTERECTOMY  IR BX TRANSCATHETER  6/22/2020  IR INSERT TUNL CVC W PORT OVER 5 YEARS  6/22/2020 Family History Problem Relation Age of Onset  Heart Disease Father  Cancer Sister Social History Tobacco Use  Smoking status: Former Smoker Last attempt to quit: 2017 Years since quitting: 3.8  Smokeless tobacco: Never Used  Tobacco comment: 1 per week as of 2017 Substance Use Topics  Alcohol use: Never Frequency: Never Prior to Admission medications Medication Sig Start Date End Date Taking? Authorizing Provider  
lidocaine-prilocaine (EMLA) topical cream Apply  to affected area as needed for Pain. 6/24/20  Yes Lily Montes MD  
ondansetron (ZOFRAN ODT) 4 mg disintegrating tablet Take 1 Tab by mouth every eight (8) hours as needed for Nausea or Vomiting. 6/24/20  Yes Lily Montes MD  
prochlorperazine (COMPAZINE) 10 mg tablet Take 0.5 Tabs by mouth every six (6) hours as needed for Nausea. 6/24/20  Yes Lily Montes MD  
budesonide/formoterol fumarate (SYMBICORT IN) Take  by inhalation. Yes Provider, Historical  
clopidogrel (PLAVIX) 75 mg tab Take 1 Tab by mouth daily. 7/25/19  Yes Jackie Wright MD  
diphenhydrAMINE (BENADRYL) 25 mg capsule Take 1 Cap by mouth every six (6) hours as needed for Itching. 7/24/19  Yes Jackie Wright MD  
aspirin delayed-release 81 mg tablet Take 81 mg by mouth daily. Yes Provider, Historical  
cholecalciferol (VITAMIN D3) 1,000 unit cap Take  by mouth daily. Yes Provider, Historical  
calcium carbonate (TUMS) 200 mg calcium (500 mg) chew Take 1 Tab by mouth daily. Yes Provider, Historical  
  
 
 
 
 
Allergies Allergen Reactions  Codeine Itching Objective:  
 
Visit Vitals /73 Pulse 78 Temp 97.4 °F (36.3 °C) Resp 16 Ht 5' 3\" (1.6 m) Wt 109 lb (49.4 kg) SpO2 100% BMI 19.31 kg/m² Pain Scale: 0 - No pain/10 Pain Location: Physical Exam: 
 
GENERAL: alert, cooperative, no distress, appears stated age EYE: conjunctivae/corneas clear. PERRL, EOM's intact LYMPHATIC: Cervical, supraclavicular, and axillary nodes normal.  
THROAT & NECK: normal and no erythema or exudates noted. LUNG: clear to auscultation bilaterally HEART: regular rate and rhythm, S1, S2 normal, no murmur, click, rub or gallop ABDOMEN: soft, non-tender. Bowel sounds normal. No masses,  no organomegaly EXTREMITIES:  extremities normal, atraumatic, no cyanosis or edema SKIN: Normal. 
NEUROLOGIC: AOx3. Gait normal. Reflexes and motor strength normal and symmetric. Cranial nerves 2-12 and sensation grossly intact. CT Results (most recent): 
Results from [facility] [place] [place] Encounter encounter on 08/21/20 CT ABD PELV W CONT Narrative INDICATION:  Malignant neoplasm of unspecified part of unspecified bronchus or 
lung. Small cell carcinoma of lung metastatic to liver and bone. EXAM: CT Chest, Abdomen and Pelvis. CT dose reduction was achieved through the 
use of a standardized protocol tailored for this examination and automatic 
exposure control for dose modulation. CONTRAST: 100 mL Isovue 370 IV. With oral contrast. 
 
COMPARISON: 6/10/2020. CHEST:  
The previous bulky left hilar/mediastinal mass and associated bronchial/arterial 
encasement have resolved, and there is decreased tubular fingerlike soft tissue 
extension from the hilum into the superior segment of the left lower lobe 
favoring endobronchial mucous impaction rather than residual tumor. Lungs are severely emphysematous without acute airspace disease/edema. There is no significant mediastinal or hilar adenopathy. There is no pleural or 
pericardial effusion. Aorta is not aneurysmal. Central pulmonary arteries are 
free of thrombus. There is coronary artery calcification. Visualized thyroid and 
lower neck soft tissues are unremarkable for age.  
 
ABDOMEN PELVIS: 
 There is significant decreased size and density of innumerable hepatic 
metastases, with resolution of hepatomegaly. There is significant decreased size of upper abdominal/olya hepatis adenopathy 
with no pathologic size nodes remaining. There is no inflammation, ascites, free air or bowel dilation. Bile ducts and 
spleen are not enlarged. Pancreas shows no mass or inflammation. Adrenal glands 
are normal in size. Kidneys show no mass or hydronephrosis. Aorta is calcified 
without aneurysm. The appendix is normal. 
 
The bladder is not distended. The distal ureters are not dilated. There is no 
pelvic mass. No aggressive osseous lesion/fracture or significant osseous 
change. Impression IMPRESSION:  
1. Resolved left hilar/mediastinal hilar mass. 2. Decreased IVA soft tissue extension (?benign endobronchial mucus impaction). 3. Decreased hepatic metastases. 4. Resolved upper abdominal adenopathy. I personally reviewed the images. Tremendous improvement in all sites including lung, liver etc.  
 
 
Lab Results Component Value Date/Time WBC 5.6 11/02/2020 08:13 AM  
 HGB 11.1 (L) 11/02/2020 08:13 AM  
 HCT 34.6 (L) 11/02/2020 08:13 AM  
 PLATELET 836 58/97/9814 08:13 AM  
 .0 (H) 11/02/2020 08:13 AM  
 
 
Lab Results Component Value Date/Time Sodium 138 11/02/2020 08:13 AM  
 Potassium 4.0 11/02/2020 08:13 AM  
 Chloride 106 11/02/2020 08:13 AM  
 CO2 25 11/02/2020 08:13 AM  
 Anion gap 7 11/02/2020 08:13 AM  
 Glucose 90 11/02/2020 08:13 AM  
 BUN 25 (H) 11/02/2020 08:13 AM  
 Creatinine 0.64 11/02/2020 08:13 AM  
 BUN/Creatinine ratio 39 (H) 11/02/2020 08:13 AM  
 GFR est AA >60 11/02/2020 08:13 AM  
 GFR est non-AA >60 11/02/2020 08:13 AM  
 Calcium 9.5 11/02/2020 08:13 AM  
 Bilirubin, total 0.4 11/02/2020 08:13 AM  
 Alk.  phosphatase 115 11/02/2020 08:13 AM  
 Protein, total 6.9 11/02/2020 08:13 AM  
 Albumin 3.7 11/02/2020 08:13 AM  
 Globulin 3.2 11/02/2020 08:13 AM  
 A-G Ratio 1.2 11/02/2020 08:13 AM  
 ALT (SGPT) 20 11/02/2020 08:13 AM  
 
 
 
Assessment: 1. 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 A detailed system by system evaluation of side effect was performed to assess chemotherapy related toxicity. Blood counts are acceptable. Results reviewed with the patient CT Chest Abd Pelv (8/21/2020): Tremendous response to treatment 2. Bone metastasis Receiving zoledronic acid 3. Pedal edema - bilateral 
 
No pain or redness. Will decrease after elevation. 1+ 
Recommended compression stockings 4. Chemotherapy induced anemia Observation 5. Severe protein calorie malnutrition Dietician consultation Protein supplements Plan:  
 
> Continue with durvalumab monotherapy 
> Continue zometa every 4 weeks 
> CT Chest Abd Pelv, NM bone scan 
> Follow-up in 4 weeks Signed by: Ankur Gibson MD 
                   November 2, 2020 
 
 
CC. Ale Jeong MD 
CC.  Bibi Gerber MD

## 2020-11-02 NOTE — LETTER
11/2/20 Patient: Eloise Morris YOB: 1950 Date of Visit: 11/2/2020 [address] [id number] VIA Facsimile: [phone] Dear Paulina Jameson MD, Thank you for referring Ms. Deepika Bourgeois to [address] [place] [address] for evaluation. My notes for this consultation are attached. If you have questions, please do not hesitate to call me. I look forward to following your patient along with you.  
 
 
Sincerely, 
 
Maryalice Sandifer, NP

## 2020-11-02 NOTE — PROGRESS NOTES
[facility]patient [facility]fusion [facility] - Chemotherapy Progress Note    0805- Pt admit to [facility] for C7D1 in stable condition. Assessment completed. R chest port accessed with positive blood return. Labs drawn per order and sent. Line flushed, clamped, Curos Cap applied to end clave. Pt over to MD office. Chemotherapy Flowsheet 11/2/2020   Cycle C7D1   Date 11/2/2020   Drug / Regimen Imfinzi   Pre Meds -   Notes given     Patient Vitals for the past 12 hrs:   Temp Pulse Resp BP SpO2   11/02/20 1144 -- 74 16 138/61 --   11/02/20 0807 97.4 °F (36.3 °C) 78 16 133/73 100 %      Recent Results (from the past 12 hour(s))   CBC WITH AUTOMATED DIFF    Collection Time: 11/02/20  8:13 AM   Result Value Ref Range    WBC 5.6 3.6 - 11.0 K/uL    RBC 3.36 (L) 3.80 - 5.20 M/uL    HGB 11.1 (L) 11.5 - 16.0 g/dL    HCT 34.6 (L) 35.0 - 47.0 %    .0 (H) 80.0 - 99.0 FL    MCH 33.0 26.0 - 34.0 PG    MCHC 32.1 30.0 - 36.5 g/dL    RDW 15.0 (H) 11.5 - 14.5 %    PLATELET 872 106 - 331 K/uL    MPV 9.7 8.9 - 12.9 FL    NRBC 0.0 0  WBC    ABSOLUTE NRBC 0.00 0.00 - 0.01 K/uL    NEUTROPHILS 73 32 - 75 %    LYMPHOCYTES 13 12 - 49 %    MONOCYTES 13 5 - 13 %    EOSINOPHILS 0 0 - 7 %    BASOPHILS 1 0 - 1 %    IMMATURE GRANULOCYTES 0 0.0 - 0.5 %    ABS. NEUTROPHILS 4.1 1.8 - 8.0 K/UL    ABS. LYMPHOCYTES 0.7 (L) 0.8 - 3.5 K/UL    ABS. MONOCYTES 0.7 0.0 - 1.0 K/UL    ABS. EOSINOPHILS 0.0 0.0 - 0.4 K/UL    ABS. BASOPHILS 0.1 0.0 - 0.1 K/UL    ABS. IMM.  GRANS. 0.0 0.00 - 0.04 K/UL    DF SMEAR SCANNED      RBC COMMENTS NORMOCYTIC, NORMOCHROMIC     METABOLIC PANEL, COMPREHENSIVE    Collection Time: 11/02/20  8:13 AM   Result Value Ref Range    Sodium 138 136 - 145 mmol/L    Potassium 4.0 3.5 - 5.1 mmol/L    Chloride 106 97 - 108 mmol/L    CO2 25 21 - 32 mmol/L    Anion gap 7 5 - 15 mmol/L    Glucose 90 65 - 100 mg/dL    BUN 25 (H) 6 - 20 MG/DL    Creatinine 0.64 0.55 - 1.02 MG/DL    BUN/Creatinine ratio 39 (H) 12 - 20      GFR est AA >60 >60 ml/min/1.73m2 GFR est non-AA >60 >60 ml/min/1.73m2    Calcium 9.5 8.5 - 10.1 MG/DL    Bilirubin, total 0.4 0.2 - 1.0 MG/DL    ALT (SGPT) 20 12 - 78 U/L    AST (SGOT) 22 15 - 37 U/L    Alk.  phosphatase 115 45 - 117 U/L    Protein, total 6.9 6.4 - 8.2 g/dL    Albumin 3.7 3.5 - 5.0 g/dL    Globulin 3.2 2.0 - 4.0 g/dL    A-G Ratio 1.2 1.1 - 2.2     URINALYSIS W/ REFLEX CULTURE    Collection Time: 11/02/20 10:01 AM    Specimen: Urine   Result Value Ref Range    Color YELLOW/STRAW      Appearance CLEAR CLEAR      Specific gravity 1.015 1.003 - 1.030      pH (UA) 5.0 5.0 - 8.0      Protein Negative NEG mg/dL    Glucose Negative NEG mg/dL    Ketone Negative NEG mg/dL    Bilirubin Negative NEG      Blood TRACE (A) NEG      Urobilinogen 0.2 0.2 - 1.0 EU/dL    Nitrites Negative NEG      Leukocyte Esterase SMALL (A) NEG      WBC 20-50 0 - 4 /hpf    RBC 5-10 0 - 5 /hpf    Epithelial cells FEW FEW /lpf    Bacteria Negative NEG /hpf    UA:UC IF INDICATED URINE CULTURE ORDERED (A) CNI      Hyaline cast 0-2 0 - 5 /lpf      Medications:  Medications Administered     0.9% sodium chloride infusion     Admin Date  11/02/2020 Action  New Bag Dose  25 mL/hr Rate  25 mL/hr Route  IntraVENous Administered By  Ankit Michelle RN          0.9% sodium chloride injection 10 mL     Admin Date  11/02/2020 Action  Given Dose  10 mL Route  IntraVENous Administered By  Ankit Michelle RN          durvalumab (IMFINZI) 1,500 mg in 0.9% sodium chloride 100 mL, overfill volume 10 mL IVPB     Admin Date  11/02/2020 Action  New Bag Dose  1500 mg Rate  140 mL/hr Route  IntraVENous Administered By  Ankit Michelle RN          heparin (porcine) pf 300-500 Units     Admin Date  11/02/2020 Action  Given Dose  500 Units Route  InterCATHeter Administered By  Ankit Michelle RN          saline peripheral flush soln 10 mL     Admin Date  11/02/2020 Action  Given Dose  10 mL Route  InterCATHeter Administered By  Ankit Michelle RN           Admin Date  11/02/2020 Action  Given Dose  10 mL Route  InterCATHeter Administered By  Sravani Claros RN          zoledronic acid (ZOMETA) 3.5 mg in 0.9% sodium chloride 100 mL IVPB     Admin Date  11/02/2020 Action  Given Dose  3.5 mg Route  IntraVENous Administered By  Sravani Claros, PARUL               Pt tolerated treatment well. Port maintained positive blood return throughout treatment, flushed with positive blood return at conclusion, and de-accessed. 1155- D/c home in no distress.  Pt aware of next [facility] appointment scheduled for:    Future Appointments   Date Time Provider Abbie Rosario   12/2/2020  8:00 AM CHAIR 2 [address] REG   12/30/2020  8:00 AM CHAIR 2 [place] [id number] [place] [address]

## 2020-11-13 NOTE — TELEPHONE ENCOUNTER
----- Message from Madeleine Callahan MD sent at 11/12/2020  4:23 PM EST -----  Good response to therapy. Activity in the bone is due to bone targeted therapy.

## 2020-12-01 NOTE — PROGRESS NOTES
[facility] 
at [facility], [facility], [address] 
[phone] Oncology Follow Up Note Patient: Taniya Cortez MRN: [id number]  SSN: [id number] YOB: 1950  Age: 65 y.o. Sex: female Diagnosis 1. 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 Palliative chemotherapy Carboplatin/etoposide/durvalumab - s/p 6 cycles Durvalumab monotherapy - Cycle 2 Day 1 Subjective:  
  
Taniya Cortez is a 65 y.o. 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 She has been experiencing mild dyspnea for over 2 years. Occasional cough, no phlegm or hemoptysis. She has some pain and discomfort in the left chest wall and RUQ. The pain is not bad and she does not take medicines. She is also having insomnia. She works for [employer] [address] [place] [address]. She was seen in the ED. CT showed a mass in the LLL invading into the mediastinum and hepatic metastasis. She denies headache or bone pains. CT guided bx established the diagnosis. Bone scan shows widespread bony metastasis. Ms. Rosendo Alvarez is receiving palliative chemotherapy. She is doing extremely well. She says she doesn't have any major pain. She says she feels \"clamy and sweating\" but she isn't really sweating. She denies diarrhea or increased shortness of breath. She says she has a good appetite. Review of Systems: 
 
Constitutional: negative Eyes: negative Ears, Nose, Mouth, Throat, and Face: negative Respiratory: shortness of breath Cardiovascular: negative Gastrointestinal: negative Genitourinary:negative Integument/Breast: negative Hematologic/Lymphatic: negative Musculoskeletal: twinges of pain Neurological: negative Past Medical History:  
Diagnosis Date  CAD (coronary artery disease)  Cancer ([facility] [address] lung Past Surgical History:  
Procedure Laterality Date  HX HEART CATHETERIZATION    
 HX HYSTERECTOMY  IR BX TRANSCATHETER  6/22/2020  IR INSERT TUNL CVC W PORT OVER 5 YEARS  6/22/2020 Family History Problem Relation Age of Onset  Heart Disease Father  Cancer Sister Social History Tobacco Use  Smoking status: Former Smoker Last attempt to quit: 2017 Years since quitting: 3.9  Smokeless tobacco: Never Used  Tobacco comment: 1 per week as of 2017 Substance Use Topics  Alcohol use: Never Frequency: Never Prior to Admission medications Medication Sig Start Date End Date Taking? Authorizing Provider  
lidocaine-prilocaine (EMLA) topical cream Apply  to affected area as needed for Pain. 6/24/20  Yes Lowell Celaya MD  
prochlorperazine (COMPAZINE) 10 mg tablet Take 0.5 Tabs by mouth every six (6) hours as needed for Nausea. 6/24/20  Yes Lowell Celaya MD  
budesonide/formoterol fumarate (SYMBICORT IN) Take  by inhalation. Yes Provider, Historical  
aspirin delayed-release 81 mg tablet Take 81 mg by mouth daily. Yes Provider, Historical  
cholecalciferol (VITAMIN D3) 1,000 unit cap Take  by mouth daily. Yes Provider, Historical  
calcium carbonate (TUMS) 200 mg calcium (500 mg) chew Take 1 Tab by mouth daily. Yes Provider, Historical  
ondansetron (ZOFRAN ODT) 4 mg disintegrating tablet Take 1 Tab by mouth every eight (8) hours as needed for Nausea or Vomiting. 6/24/20   Lowell Celaya MD  
clopidogrel (PLAVIX) 75 mg tab Take 1 Tab by mouth daily. 7/25/19   Elinor Babcock MD  
diphenhydrAMINE (BENADRYL) 25 mg capsule Take 1 Cap by mouth every six (6) hours as needed for Itching. 7/24/19   Fredy Gifford MD  
  
 
 
Allergies Allergen Reactions  Codeine Itching Objective:  
 
Visit Vitals BP (!) 169/70 Pulse 75 Temp 97.7 °F (36.5 °C) Resp 18 Ht 5' 3\" (1.6 m) Wt 111 lb (50.3 kg) SpO2 100% BMI 19.66 kg/m² Pain Scale: 0 - No pain/10 Physical Exam: 
 
GENERAL: alert, cooperative, no distress, appears stated age EYE: conjunctivae/corneas clear LYMPHATIC: Cervical, supraclavicular, and axillary nodes normal.  
THROAT & NECK: normal and no erythema or exudates noted. LUNG: clear to auscultation bilaterally HEART: regular rate and rhythm ABDOMEN: soft, non-tender. EXTREMITIES:  extremities normal, atraumatic, no cyanosis or edema SKIN: alopecia - improving NEUROLOGIC: AOx3. Gait normal.  
 
Physical exam was pulled in from a previous visit. No changes were made d/t physical exam remains the same. CT Results (most recent): 
[place] [facility] [place] encounter on 11/12/20 CT ABD PELV W CONT Narrative INDICATION: Restaging disease  small cell carcinoma COMPARISON: August 21, 2020 TECHNIQUE:  Following the uneventful intravenous administration of 100 cc Isovue-300, 5 mm axial images were obtained through the chest, abdomen and 
pelvis. Coronal and sagittal reformats were generated. CT dose reduction was 
achieved through use of a standardized protocol tailored for this examination 
and automatic exposure control for dose modulation. Oral contrast was given FINDINGS: 
 
CHEST WALL: No mass or axillary lymphadenopathy. Infusion catheter in place. THYROID: Small nodules are stable. MEDIASTINUM: No mass or lymphadenopathy. PAULINA: No mass or lymphadenopathy. THORACIC AORTA: No dissection or aneurysm. MAIN PULMONARY ARTERY: Normal in caliber. TRACHEA/BRONCHI: Patent. ESOPHAGUS: No wall thickening or dilatation. HEART: Normal in size. PLEURA: No effusion or pneumothorax. LUNGS: Severe emphysema. Left infrahilar lesion is unchanged. 2 small right 
lower lobe pleural nodules are stable. Liver disease show decrease in number and size of multiple lesions. Gallbladder, adrenals appear unremarkable. There is no adenopathy in the abdomen or pelvis. Fecal stasis. There is no bowel wall thickening or obstruction no free air or 
free fluid. Prior hysterectomy. Kidneys appeared unobstructed. Review of bone windows show progression of disease in the pelvic bones. Impression IMPRESSION: 
1. Stable lesions in the chest. 
2. Decreased number and size of liver lesion since the prior examination. 3. Progression of bone disease. I personally reviewed the images. Tremendous improvement in all sites including lung, liver etc.  
 
 
Lab Results Component Value Date/Time WBC 4.9 12/02/2020 08:09 AM  
 HGB 12.1 12/02/2020 08:09 AM  
 HCT 37.8 12/02/2020 08:09 AM  
 PLATELET 229 (L) 35/39/3105 08:09 AM  
 MCV 98.4 12/02/2020 08:09 AM  
 
 
Lab Results Component Value Date/Time Sodium 140 12/02/2020 08:09 AM  
 Potassium 3.7 12/02/2020 08:09 AM  
 Chloride 108 12/02/2020 08:09 AM  
 CO2 29 12/02/2020 08:09 AM  
 Anion gap 3 (L) 12/02/2020 08:09 AM  
 Glucose 79 12/02/2020 08:09 AM  
 BUN 18 12/02/2020 08:09 AM  
 Creatinine 0.76 12/02/2020 08:09 AM  
 BUN/Creatinine ratio 24 (H) 12/02/2020 08:09 AM  
 GFR est AA >60 12/02/2020 08:09 AM  
 GFR est non-AA >60 12/02/2020 08:09 AM  
 Calcium 9.1 12/02/2020 08:09 AM  
 Bilirubin, total 0.5 12/02/2020 08:09 AM  
 Alk. phosphatase 85 12/02/2020 08:09 AM  
 Protein, total 6.5 12/02/2020 08:09 AM  
 Albumin 3.6 12/02/2020 08:09 AM  
 Globulin 2.9 12/02/2020 08:09 AM  
 A-G Ratio 1.2 12/02/2020 08:09 AM  
 ALT (SGPT) 20 12/02/2020 08:09 AM  
 
 
 
Assessment: 1. 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 A detailed system by system evaluation of side effect was performed to assess chemotherapy related toxicity. Blood counts are acceptable. Results reviewed with the patient CT Chest Abd Pelv (8/21/2020): Tremendous response to treatment CT Chest Abd Pelv (11/12/2020) Good response to therapy. NM Bone scan (11/12/2020) Activity in bone d/t bone targeting agent 2. Bone metastasis Receiving zoledronic acid 3. Pedal edema - bilateral 
 
No pain or redness. Will decrease after elevation. 1+ 
Recommended compression stockings 4. Chemotherapy induced anemia Observation 5. Severe protein calorie malnutrition Dietician consultation Protein supplements Plan:  
 
> Continue with durvalumab monotherapy 
> Continue zometa every 4 weeks 
> Follow-up in 4 weeks I performed a history and physical examination of the patient and discussed his management with the NPP. I reviewed the NPP note and agree with the documented findings and plan of care. The patient was seen in conjunction with Ms. Xie. Signed by: Antonio Nation MD 
                   December 2, 2020 
 
 
CC. Naya Carter MD 
CC.  Tamie Bains MD

## 2020-12-01 NOTE — PROGRESS NOTES
Socrates Simmons is a 65 y.o. female here for follow up for lung cancer. Chemo today:  Cycle 8 Day 1 Durvalumab Zometa every 6 weeks 1. Have you been to the ER, urgent care clinic since your last visit? Hospitalized since your last visit? no 
 
2. Have you seen or consulted any other health care providers outside of the [address] [place] [address] since your last visit? Include any pap smears or colon screening. No 
 
Pt states she has been doing fine since last visit. No complaints. Will be having physical with PCP this month.

## 2020-12-02 NOTE — PROGRESS NOTES
Pt arrived to [address] [place] [address]. ambulatory in no acute distress at 0805 for Imfinzi/Zometa C8.  Assessment unremarkable. R chest port accessed without issue and positive blood return noted.  Labs obtained, CBC, CMP, TSH. Visit Vitals  BP (!) 169/70 (BP 1 Location: Left arm, BP Patient Position: Sitting)   Pulse 75   Temp 97.7 °F (36.5 °C)   Resp 18   Ht 5' 3\" (1.6 m)   Wt 50.8 kg (111 lb 14.4 oz)   SpO2 100%   Breastfeeding No   BMI 19.82 kg/m²     Recent Results (from the past 12 hour(s))   CBC WITH AUTOMATED DIFF    Collection Time: 12/02/20  8:09 AM   Result Value Ref Range    WBC 4.9 3.6 - 11.0 K/uL    RBC 3.84 3.80 - 5.20 M/uL    HGB 12.1 11.5 - 16.0 g/dL    HCT 37.8 35.0 - 47.0 %    MCV 98.4 80.0 - 99.0 FL    MCH 31.5 26.0 - 34.0 PG    MCHC 32.0 30.0 - 36.5 g/dL    RDW 13.4 11.5 - 14.5 %    PLATELET 078 (L) 674 - 400 K/uL    MPV 10.4 8.9 - 12.9 FL    NRBC 0.0 0  WBC    ABSOLUTE NRBC 0.00 0.00 - 0.01 K/uL    NEUTROPHILS 70 32 - 75 %    LYMPHOCYTES 17 12 - 49 %    MONOCYTES 10 5 - 13 %    EOSINOPHILS 2 0 - 7 %    BASOPHILS 0 0 - 1 %    IMMATURE GRANULOCYTES 0 0.0 - 0.5 %    ABS. NEUTROPHILS 3.5 1.8 - 8.0 K/UL    ABS. LYMPHOCYTES 0.8 0.8 - 3.5 K/UL    ABS. MONOCYTES 0.5 0.0 - 1.0 K/UL    ABS. EOSINOPHILS 0.1 0.0 - 0.4 K/UL    ABS. BASOPHILS 0.0 0.0 - 0.1 K/UL    ABS. IMM. GRANS. 0.0 0.00 - 0.04 K/UL    DF AUTOMATED     METABOLIC PANEL, COMPREHENSIVE    Collection Time: 12/02/20  8:09 AM   Result Value Ref Range    Sodium 140 136 - 145 mmol/L    Potassium 3.7 3.5 - 5.1 mmol/L    Chloride 108 97 - 108 mmol/L    CO2 29 21 - 32 mmol/L    Anion gap 3 (L) 5 - 15 mmol/L    Glucose 79 65 - 100 mg/dL    BUN 18 6 - 20 MG/DL    Creatinine 0.76 0.55 - 1.02 MG/DL    BUN/Creatinine ratio 24 (H) 12 - 20      GFR est AA >60 >60 ml/min/1.73m2    GFR est non-AA >60 >60 ml/min/1.73m2    Calcium 9.1 8.5 - 10.1 MG/DL    Bilirubin, total 0.5 0.2 - 1.0 MG/DL    ALT (SGPT) 20 12 - 78 U/L    AST (SGOT) 17 15 - 37 U/L    Alk. phosphatase 85 45 - 117 U/L    Protein, total 6.5 6.4 - 8.2 g/dL    Albumin 3.6 3.5 - 5.0 g/dL    Globulin 2.9 2.0 - 4.0 g/dL    A-G Ratio 1.2 1.1 - 2.2       Creatinine Clearance 53    The following medications administered:  [email or web address]  Zometa 3.5mg IV  Imfinzi IV    Visit Vitals  BP (!) 151/79   Pulse 77   Temp 97.7 °F (36.5 °C)   Resp 18   Ht 5' 3\" (1.6 m)   Wt 50.8 kg (111 lb 14.4 oz)   SpO2 100%   Breastfeeding No   BMI 19.82 kg/m²       Pt tolerated treatment well. Port flushed per policy, needle removed, 2x2 and bandaid placed.  Pt discharged ambulatory in no acute distress at 1145, accompanied by self. Next appointment 12/30/20 at 0800.

## 2020-12-02 NOTE — LETTER
12/2/20 Patient: Janessa Samaniego YOB: 1950 Date of Visit: 12/2/2020 Katie [address] [place] [address] [id number] VIA Facsimile: [phone] Dear Mary Resendiz MD, Thank you for referring Ms. Hang Corrales to [address] [place] [address] for evaluation. My notes for this consultation are attached. If you have questions, please do not hesitate to call me. I look forward to following your patient along with you.  
 
 
Sincerely, 
 
Triston Walker NP

## 2020-12-29 NOTE — PROGRESS NOTES
We will notify you with the results of the rapid strep test.     To get relief from the sore throat, try gargling with salt water, drinking cold fluids, using throat lozenges and Tylenol or ibuprofen if necessary for pain or fever. Lots of fluids are important, especially when someone is not eating well because their throat hurts. Follow up for any new or worsening symptoms or if there is no improvement in the next 5-7 days.     Cesar Scruggs is a 65 y.o. female here for follow up for lung cancer. Treatment today:  Cycle 9 Day 1 Durvalumab Receiving Zometa today. 1. Have you been to the ER, urgent care clinic since your last visit? Hospitalized since your last visit? no 
 
2. Have you seen or consulted any other health care providers outside of the [address] [place] [address] since your last visit? Include any pap smears or colon screening. PCP last Wed for yearly physical 
 
Pt states she has been doing fine. No complaints.

## 2020-12-30 NOTE — PROGRESS NOTES
[facility] Progress Note Date: 2020 Name: Matt Gilman MRN: [id number] : 1950 Ms. [place] arrived ambulatory at 0800 and in no distress for [address]. Assessment was completed, no acute issues at this time, no new complaints voiced. Covid Screening 1. Do you have any symptoms of COVID-19? SOB, coughing, fever, or generally not feeling well ? No 
2. Have you been exposed to COVID-19 recently? No 
3. Have you had any recent contact with family/friend that has a pending COVID test? No 
 
Venous Access Device [employer] lot [id number] 20 Upper chest (subclavicular area, right (Active) Central Line Being Utilized Yes 20 08 Criteria for Appropriate Use Irritant/vesicant medication 20 0800 Site Assessment Clean, dry, & intact 20 08 Date of Last Dressing Change 20 Dressing Status New;Clean, dry, & intact 20 Dressing Type Transparent 20 08 Action Taken Blood drawn 20 0800 Date Accessed (Medial Site) 20 0800 Access Time (Medial Site) 0815 20 0800 Access Needle Size (Site #1) 20 G 20 08 Access Needle Length (Medial Site) 0.75 inches 20 08 Positive Blood Return (Medial Site) Yes 20 08 Action Taken (Medial Site) Blood drawn;Flushed 20 08 Alcohol Cap Used Yes 20 0800  
 
+ blood return noted, labs drawn and sent. Proceeded to appt with Dr. Ms. Augustin Caraballo vitals were reviewed. Patient Vitals for the past 12 hrs: 
 Temp Pulse Resp BP SpO2  
20 1234  67 16 139/73   
20 0805 96.8 °F (36 °C) 70 16 (!) 159/78 98 % Lab results were obtained and reviewed. Recent Results (from the past 12 hour(s)) CBC WITH AUTOMATED DIFF Collection Time: 20  8:14 AM  
Result Value Ref Range WBC 3.7 3.6 - 11.0 K/uL  
 RBC 4.01 3.80 - 5.20 M/uL  
 HGB 12.5 11.5 - 16.0 g/dL HCT 38.2 35.0 - 47.0 % MCV 95.3 80.0 - 99.0 FL  
 MCH 31.2 26.0 - 34.0 PG  
 MCHC 32.7 30.0 - 36.5 g/dL  
 RDW 12.7 11.5 - 14.5 % PLATELET 655 (L) 409 - 400 K/uL MPV 9.6 8.9 - 12.9 FL  
 NRBC 0.0 0  WBC ABSOLUTE NRBC 0.00 0.00 - 0.01 K/uL NEUTROPHILS 69 32 - 75 % LYMPHOCYTES 17 12 - 49 % MONOCYTES 11 5 - 13 % EOSINOPHILS 2 0 - 7 % BASOPHILS 1 0 - 1 % IMMATURE GRANULOCYTES 0 0.0 - 0.5 % ABS. NEUTROPHILS 2.6 1.8 - 8.0 K/UL  
 ABS. LYMPHOCYTES 0.6 (L) 0.8 - 3.5 K/UL  
 ABS. MONOCYTES 0.4 0.0 - 1.0 K/UL  
 ABS. EOSINOPHILS 0.1 0.0 - 0.4 K/UL  
 ABS. BASOPHILS 0.0 0.0 - 0.1 K/UL  
 ABS. IMM. GRANS. 0.0 0.00 - 0.04 K/UL  
 DF SMEAR SCANNED    
 RBC COMMENTS NORMOCYTIC, NORMOCHROMIC METABOLIC PANEL, COMPREHENSIVE Collection Time: 12/30/20  8:14 AM  
Result Value Ref Range Sodium 139 136 - 145 mmol/L Potassium 3.8 3.5 - 5.1 mmol/L Chloride 106 97 - 108 mmol/L  
 CO2 29 21 - 32 mmol/L Anion gap 4 (L) 5 - 15 mmol/L Glucose 91 65 - 100 mg/dL BUN 16 6 - 20 MG/DL Creatinine 0.79 0.55 - 1.02 MG/DL  
 BUN/Creatinine ratio 20 12 - 20 GFR est AA >60 >60 ml/min/1.73m2 GFR est non-AA >60 >60 ml/min/1.73m2 Calcium 9.1 8.5 - 10.1 MG/DL Bilirubin, total 0.4 0.2 - 1.0 MG/DL  
 ALT (SGPT) 18 12 - 78 U/L  
 AST (SGOT) 17 15 - 37 U/L Alk. phosphatase 78 45 - 117 U/L Protein, total 6.7 6.4 - 8.2 g/dL Albumin 3.7 3.5 - 5.0 g/dL Globulin 3.0 2.0 - 4.0 g/dL A-G Ratio 1.2 1.1 - 2.2 TSH 3RD GENERATION Collection Time: 12/30/20  8:14 AM  
Result Value Ref Range TSH 1.58 0.36 - 3.74 uIU/mL Patient's labs within parameters for treatment. Pre-medications  were administered as ordered and chemotherapy was initiated. Medication: 
Medications Administered 0.9% sodium chloride infusion Admin Date 
12/30/2020 Action New Bag Dose 25 mL/hr Rate 25 mL/hr Route IntraVENous Administered By 
Derrichas Suárez A  
  
  
 0.9% sodium chloride injection 10 mL Admin Date 
12/30/2020 Action Given Dose 
10 mL Route IntraVENous Administered By 
Alyson GLASGOW  
  
  
 durvalumab (IMFINZI) 1,500 mg in 0.9% sodium chloride 100 mL, overfill volume 10 mL IVPB Admin Date 
12/30/2020 Action New Bag Dose 
1,500 mg Rate 140 mL/hr Route IntraVENous Administered By 
Alyson GLASGOW  
  
  
 heparin (porcine) pf 300-500 Units Admin Date 
12/30/2020 Action Given Dose 
500 Units Route InterCATHeter Administered By 
Alyson Doshi A  
  
  
 saline peripheral flush soln 10 mL Admin Date 
12/30/2020 Action Given Dose 
10 mL Route InterCATHeter Administered By 
Dewayne Eves Admin Date 
12/30/2020 Action Given Dose 
10 mL Route InterCATHeter Administered By 
Alyson GLASGOW  
  
  
 zoledronic acid (ZOMETA) 3.5 mg in 0.9% sodium chloride 100 mL IVPB Admin Date 
12/30/2020 Action Given Dose 3.5 mg Route IntraVENous Administered By 
Dewayne Eves Patient port flushed; heparinized; and de-accessed per protocol. Ms. Beny Ruiz tolerated treatment well and was discharged from [place] [address] in stable condition at 1235. She is aware of when to return for her next appointment. Future Appointments Date Time Provider Abbie Rosario 12/30/2020  8:30 AM Iraida Wolff NP ONC BS AMB  
1/27/2021  8:00 AM CHAIR 2 [facility] REG  
2/24/2021  8:00 AM CHAIR 2 [facility] REG Josephine Godoy December 30, 2020

## 2020-12-30 NOTE — LETTER
12/30/2020 Patient: Matthew Shoemaker YOB: 1950 Date of Visit: 12/30/2020 Marylen Beecham, [place] [address] [phone] Via Fax: [phone] Dear Marylen Beecham, MD, Thank you for referring Ms. Chari Carreno to [address] [place] [address] for evaluation. My notes for this consultation are attached. If you have questions, please do not hesitate to call me. I look forward to following your patient along with you.  
 
 
Sincerely, 
 
Shayla Dunne NP

## 2021-01-01 ENCOUNTER — DOCUMENTATION ONLY (OUTPATIENT)
Dept: ONCOLOGY | Age: 71
End: 2021-01-01

## 2021-01-01 ENCOUNTER — OFFICE VISIT (OUTPATIENT)
Dept: ONCOLOGY | Age: 71
End: 2021-01-01
Payer: MEDICARE

## 2021-01-01 ENCOUNTER — HOSPITAL ENCOUNTER (OUTPATIENT)
Dept: MRI IMAGING | Age: 71
Discharge: HOME | End: 2021-06-06
Attending: NURSE PRACTITIONER
Payer: MEDICARE

## 2021-01-01 ENCOUNTER — APPOINTMENT (OUTPATIENT)
Dept: INFUSION THERAPY | Age: 71
End: 2021-01-01

## 2021-01-01 ENCOUNTER — HOSPITAL ENCOUNTER (OUTPATIENT)
Dept: INFUSION THERAPY | Age: 71
Discharge: HOME | End: 2021-03-01
Payer: MEDICARE

## 2021-01-01 ENCOUNTER — HOSPITAL ENCOUNTER (OUTPATIENT)
Dept: CT IMAGING | Age: 71
End: 2021-01-01
Attending: INTERNAL MEDICINE
Payer: MEDICARE

## 2021-01-01 ENCOUNTER — HOSPITAL ENCOUNTER (OUTPATIENT)
Dept: INFUSION THERAPY | Age: 71
Discharge: HOME | End: 2021-03-25
Payer: MEDICARE

## 2021-01-01 ENCOUNTER — HOSPITAL ENCOUNTER (OUTPATIENT)
Dept: MRI IMAGING | Age: 71
Discharge: HOME | End: 2021-03-02
Attending: NURSE PRACTITIONER
Payer: MEDICARE

## 2021-01-01 ENCOUNTER — HOSPITAL ENCOUNTER (OUTPATIENT)
Dept: NUCLEAR MEDICINE | Age: 71
Discharge: HOME | End: 2021-05-19
Attending: INTERNAL MEDICINE
Payer: MEDICARE

## 2021-01-01 ENCOUNTER — HOSPITAL ENCOUNTER (OUTPATIENT)
Dept: CT IMAGING | Age: 71
Discharge: HOME | End: 2021-05-19
Attending: INTERNAL MEDICINE
Payer: MEDICARE

## 2021-01-01 ENCOUNTER — HOSPITAL ENCOUNTER (OUTPATIENT)
Dept: CT IMAGING | Age: 71
Discharge: HOME | End: 2021-02-17
Attending: INTERNAL MEDICINE
Payer: MEDICARE

## 2021-01-01 ENCOUNTER — HOSPITAL ENCOUNTER (OUTPATIENT)
Dept: INFUSION THERAPY | Age: 71
Discharge: HOME | End: 2021-05-27
Payer: MEDICARE

## 2021-01-01 ENCOUNTER — HOSPITAL ENCOUNTER (OUTPATIENT)
Dept: INFUSION THERAPY | Age: 71
Discharge: HOME | End: 2021-01-27
Payer: MEDICARE

## 2021-01-01 ENCOUNTER — HOSPITAL ENCOUNTER (OUTPATIENT)
Dept: NUCLEAR MEDICINE | Age: 71
Discharge: HOME | End: 2021-02-17
Attending: INTERNAL MEDICINE
Payer: MEDICARE

## 2021-01-01 ENCOUNTER — HOSPITAL ENCOUNTER (OUTPATIENT)
Dept: INFUSION THERAPY | Age: 71
Discharge: HOME | End: 2021-04-15
Payer: MEDICARE

## 2021-01-01 ENCOUNTER — HOSPITAL ENCOUNTER (OUTPATIENT)
Dept: RADIATION THERAPY | Age: 71
Discharge: HOME | End: 2021-04-02

## 2021-01-01 ENCOUNTER — HOSPITAL ENCOUNTER (OUTPATIENT)
Dept: INFUSION THERAPY | Age: 71
Discharge: HOME | End: 2021-06-08
Payer: MEDICARE

## 2021-01-01 ENCOUNTER — HOSPITAL ENCOUNTER (OUTPATIENT)
Dept: INFUSION THERAPY | Age: 71
Discharge: HOME | End: 2021-02-24
Payer: MEDICARE

## 2021-01-01 ENCOUNTER — HOSPITAL ENCOUNTER (OUTPATIENT)
Dept: INFUSION THERAPY | Age: 71
Discharge: HOME | End: 2021-06-03
Payer: MEDICARE

## 2021-01-01 ENCOUNTER — TELEPHONE (OUTPATIENT)
Dept: PALLATIVE CARE | Age: 71
End: 2021-01-01

## 2021-01-01 ENCOUNTER — HOSPITAL ENCOUNTER (OUTPATIENT)
Dept: INFUSION THERAPY | Age: 71
Discharge: HOME | End: 2021-06-07
Payer: MEDICARE

## 2021-01-01 ENCOUNTER — HOSPITAL ENCOUNTER (OUTPATIENT)
Dept: INFUSION THERAPY | Age: 71
Discharge: HOME | End: 2021-05-06
Payer: MEDICARE

## 2021-01-01 VITALS
DIASTOLIC BLOOD PRESSURE: 77 MMHG | RESPIRATION RATE: 18 BRPM | WEIGHT: 111.8 LBS | HEIGHT: 63 IN | HEART RATE: 76 BPM | BODY MASS INDEX: 19.81 KG/M2 | SYSTOLIC BLOOD PRESSURE: 161 MMHG | TEMPERATURE: 97.9 F | OXYGEN SATURATION: 100 %

## 2021-01-01 VITALS
OXYGEN SATURATION: 95 % | DIASTOLIC BLOOD PRESSURE: 81 MMHG | RESPIRATION RATE: 18 BRPM | HEIGHT: 63 IN | BODY MASS INDEX: 19.67 KG/M2 | WEIGHT: 111 LBS | TEMPERATURE: 97.1 F | SYSTOLIC BLOOD PRESSURE: 158 MMHG | HEART RATE: 77 BPM

## 2021-01-01 VITALS
TEMPERATURE: 97.7 F | BODY MASS INDEX: 19.44 KG/M2 | OXYGEN SATURATION: 96 % | WEIGHT: 109.7 LBS | DIASTOLIC BLOOD PRESSURE: 78 MMHG | HEART RATE: 82 BPM | HEIGHT: 63 IN | SYSTOLIC BLOOD PRESSURE: 154 MMHG | RESPIRATION RATE: 18 BRPM

## 2021-01-01 VITALS
BODY MASS INDEX: 20.45 KG/M2 | TEMPERATURE: 97.6 F | WEIGHT: 115.4 LBS | SYSTOLIC BLOOD PRESSURE: 150 MMHG | OXYGEN SATURATION: 97 % | HEIGHT: 63 IN | DIASTOLIC BLOOD PRESSURE: 83 MMHG | RESPIRATION RATE: 16 BRPM | HEART RATE: 72 BPM

## 2021-01-01 VITALS
DIASTOLIC BLOOD PRESSURE: 66 MMHG | TEMPERATURE: 98 F | WEIGHT: 102.8 LBS | SYSTOLIC BLOOD PRESSURE: 170 MMHG | BODY MASS INDEX: 18.21 KG/M2 | HEART RATE: 84 BPM | RESPIRATION RATE: 18 BRPM | HEIGHT: 63 IN

## 2021-01-01 VITALS
TEMPERATURE: 98.9 F | HEART RATE: 87 BPM | BODY MASS INDEX: 19.31 KG/M2 | RESPIRATION RATE: 16 BRPM | WEIGHT: 109 LBS | HEIGHT: 63 IN | DIASTOLIC BLOOD PRESSURE: 78 MMHG | SYSTOLIC BLOOD PRESSURE: 169 MMHG | OXYGEN SATURATION: 96 %

## 2021-01-01 VITALS
RESPIRATION RATE: 16 BRPM | HEIGHT: 63 IN | DIASTOLIC BLOOD PRESSURE: 82 MMHG | TEMPERATURE: 98.4 F | OXYGEN SATURATION: 97 % | BODY MASS INDEX: 18.68 KG/M2 | HEART RATE: 52 BPM | SYSTOLIC BLOOD PRESSURE: 158 MMHG | WEIGHT: 105.4 LBS

## 2021-01-01 VITALS
DIASTOLIC BLOOD PRESSURE: 74 MMHG | HEART RATE: 71 BPM | SYSTOLIC BLOOD PRESSURE: 163 MMHG | RESPIRATION RATE: 16 BRPM | OXYGEN SATURATION: 97 % | TEMPERATURE: 97.6 F | WEIGHT: 115 LBS | BODY MASS INDEX: 20.38 KG/M2 | HEIGHT: 63 IN

## 2021-01-01 VITALS
RESPIRATION RATE: 18 BRPM | HEART RATE: 73 BPM | SYSTOLIC BLOOD PRESSURE: 146 MMHG | SYSTOLIC BLOOD PRESSURE: 161 MMHG | HEIGHT: 63 IN | HEART RATE: 76 BPM | DIASTOLIC BLOOD PRESSURE: 77 MMHG | BODY MASS INDEX: 19.67 KG/M2 | TEMPERATURE: 97.4 F | HEIGHT: 63 IN | WEIGHT: 111 LBS | OXYGEN SATURATION: 100 % | OXYGEN SATURATION: 98 % | TEMPERATURE: 97.9 F | RESPIRATION RATE: 18 BRPM | DIASTOLIC BLOOD PRESSURE: 76 MMHG | BODY MASS INDEX: 19.37 KG/M2 | WEIGHT: 109.3 LBS

## 2021-01-01 VITALS
DIASTOLIC BLOOD PRESSURE: 77 MMHG | RESPIRATION RATE: 14 BRPM | TEMPERATURE: 98.9 F | HEIGHT: 63 IN | WEIGHT: 109.1 LBS | BODY MASS INDEX: 19.33 KG/M2 | OXYGEN SATURATION: 100 % | HEART RATE: 73 BPM | SYSTOLIC BLOOD PRESSURE: 141 MMHG

## 2021-01-01 VITALS
WEIGHT: 105 LBS | DIASTOLIC BLOOD PRESSURE: 80 MMHG | BODY MASS INDEX: 18.61 KG/M2 | RESPIRATION RATE: 16 BRPM | TEMPERATURE: 98.4 F | SYSTOLIC BLOOD PRESSURE: 158 MMHG | OXYGEN SATURATION: 97 % | HEIGHT: 63 IN | HEART RATE: 98 BPM

## 2021-01-01 VITALS
TEMPERATURE: 97.4 F | SYSTOLIC BLOOD PRESSURE: 137 MMHG | HEIGHT: 63 IN | HEART RATE: 71 BPM | BODY MASS INDEX: 19.37 KG/M2 | WEIGHT: 109.3 LBS | DIASTOLIC BLOOD PRESSURE: 68 MMHG | RESPIRATION RATE: 16 BRPM

## 2021-01-01 VITALS
TEMPERATURE: 97.7 F | BODY MASS INDEX: 19.31 KG/M2 | HEART RATE: 81 BPM | DIASTOLIC BLOOD PRESSURE: 84 MMHG | SYSTOLIC BLOOD PRESSURE: 163 MMHG | WEIGHT: 109 LBS | OXYGEN SATURATION: 96 % | RESPIRATION RATE: 18 BRPM | HEIGHT: 63 IN

## 2021-01-01 VITALS
DIASTOLIC BLOOD PRESSURE: 83 MMHG | HEIGHT: 63 IN | BODY MASS INDEX: 19.67 KG/M2 | WEIGHT: 111 LBS | RESPIRATION RATE: 18 BRPM | TEMPERATURE: 97.1 F | OXYGEN SATURATION: 95 % | HEART RATE: 87 BPM | SYSTOLIC BLOOD PRESSURE: 192 MMHG

## 2021-01-01 VITALS
HEART RATE: 92 BPM | SYSTOLIC BLOOD PRESSURE: 159 MMHG | WEIGHT: 102.1 LBS | DIASTOLIC BLOOD PRESSURE: 75 MMHG | BODY MASS INDEX: 18.09 KG/M2 | RESPIRATION RATE: 16 BRPM

## 2021-01-01 VITALS
TEMPERATURE: 98.1 F | OXYGEN SATURATION: 98 % | SYSTOLIC BLOOD PRESSURE: 157 MMHG | RESPIRATION RATE: 16 BRPM | DIASTOLIC BLOOD PRESSURE: 77 MMHG | HEART RATE: 95 BPM

## 2021-01-01 DIAGNOSIS — C34.90 SMALL CELL LUNG CANCER (HCC): Primary | ICD-10-CM

## 2021-01-01 DIAGNOSIS — C78.7 SMALL CELL CARCINOMA OF LUNG METASTATIC TO LIVER (HCC): Primary | ICD-10-CM

## 2021-01-01 DIAGNOSIS — M54.89 BACK PAIN WITHOUT SCIATICA: ICD-10-CM

## 2021-01-01 DIAGNOSIS — C34.90 SMALL CELL CARCINOMA OF LUNG METASTATIC TO LIVER (HCC): Primary | ICD-10-CM

## 2021-01-01 DIAGNOSIS — C34.90 SMALL CELL CARCINOMA OF LUNG METASTATIC TO LIVER (HCC): ICD-10-CM

## 2021-01-01 DIAGNOSIS — E43 SEVERE PROTEIN-CALORIE MALNUTRITION (HCC): ICD-10-CM

## 2021-01-01 DIAGNOSIS — C34.90 SMALL CELL LUNG CANCER (HCC): ICD-10-CM

## 2021-01-01 DIAGNOSIS — R94.8 ABNORMAL BONE SCAN OF THORACIC SPINE: ICD-10-CM

## 2021-01-01 DIAGNOSIS — C79.31 LUNG CANCER METASTATIC TO BRAIN (HCC): ICD-10-CM

## 2021-01-01 DIAGNOSIS — D69.6 THROMBOCYTOPENIA (HCC): ICD-10-CM

## 2021-01-01 DIAGNOSIS — C78.7 SMALL CELL CARCINOMA OF LUNG METASTATIC TO LIVER (HCC): ICD-10-CM

## 2021-01-01 DIAGNOSIS — C79.51 BONE METASTASES (HCC): ICD-10-CM

## 2021-01-01 DIAGNOSIS — C79.31 LUNG CANCER METASTATIC TO BRAIN (HCC): Primary | ICD-10-CM

## 2021-01-01 DIAGNOSIS — C79.31 BRAIN METASTASES (HCC): ICD-10-CM

## 2021-01-01 DIAGNOSIS — C34.90 LUNG CANCER METASTATIC TO BRAIN (HCC): Primary | ICD-10-CM

## 2021-01-01 DIAGNOSIS — D69.6 THROMBOCYTOPENIA (HCC): Primary | ICD-10-CM

## 2021-01-01 DIAGNOSIS — C34.90 LUNG CANCER METASTATIC TO BRAIN (HCC): ICD-10-CM

## 2021-01-01 DIAGNOSIS — R94.8 ABNORMAL BONE SCAN OF LUMBAR SPINE: ICD-10-CM

## 2021-01-01 LAB
ABO + RH BLD: NORMAL
ALBUMIN SERPL-MCNC: 3.2 G/DL (ref 3.5–5)
ALBUMIN SERPL-MCNC: 3.3 G/DL (ref 3.5–5)
ALBUMIN SERPL-MCNC: 3.4 G/DL (ref 3.5–5)
ALBUMIN SERPL-MCNC: 3.4 G/DL (ref 3.5–5)
ALBUMIN SERPL-MCNC: 3.5 G/DL (ref 3.5–5)
ALBUMIN SERPL-MCNC: 3.6 G/DL (ref 3.5–5)
ALBUMIN/GLOB SERPL: 0.9 {RATIO} (ref 1.1–2.2)
ALBUMIN/GLOB SERPL: 1 {RATIO} (ref 1.1–2.2)
ALBUMIN/GLOB SERPL: 1.1 {RATIO} (ref 1.1–2.2)
ALBUMIN/GLOB SERPL: 1.2 {RATIO} (ref 1.1–2.2)
ALP SERPL-CCNC: 101 U/L (ref 45–117)
ALP SERPL-CCNC: 124 U/L (ref 45–117)
ALP SERPL-CCNC: 128 U/L (ref 45–117)
ALP SERPL-CCNC: 167 U/L (ref 45–117)
ALP SERPL-CCNC: 184 U/L (ref 45–117)
ALP SERPL-CCNC: 202 U/L (ref 45–117)
ALP SERPL-CCNC: 77 U/L (ref 45–117)
ALP SERPL-CCNC: 88 U/L (ref 45–117)
ALT SERPL-CCNC: 16 U/L (ref 12–78)
ALT SERPL-CCNC: 19 U/L (ref 12–78)
ALT SERPL-CCNC: 19 U/L (ref 12–78)
ALT SERPL-CCNC: 20 U/L (ref 12–78)
ALT SERPL-CCNC: 25 U/L (ref 12–78)
ALT SERPL-CCNC: 25 U/L (ref 12–78)
ALT SERPL-CCNC: 32 U/L (ref 12–78)
ALT SERPL-CCNC: 40 U/L (ref 12–78)
ANION GAP BLD CALC-SCNC: 13 MMOL/L (ref 10–20)
ANION GAP BLD CALC-SCNC: 14 MMOL/L (ref 10–20)
ANION GAP SERPL CALC-SCNC: 11 MMOL/L (ref 5–15)
ANION GAP SERPL CALC-SCNC: 5 MMOL/L (ref 5–15)
ANION GAP SERPL CALC-SCNC: 5 MMOL/L (ref 5–15)
ANION GAP SERPL CALC-SCNC: 6 MMOL/L (ref 5–15)
ANION GAP SERPL CALC-SCNC: 7 MMOL/L (ref 5–15)
AST SERPL-CCNC: 17 U/L (ref 15–37)
AST SERPL-CCNC: 20 U/L (ref 15–37)
AST SERPL-CCNC: 23 U/L (ref 15–37)
AST SERPL-CCNC: 24 U/L (ref 15–37)
AST SERPL-CCNC: 24 U/L (ref 15–37)
AST SERPL-CCNC: 33 U/L (ref 15–37)
AST SERPL-CCNC: 43 U/L (ref 15–37)
AST SERPL-CCNC: 67 U/L (ref 15–37)
BASO+EOS+MONOS # BLD AUTO: 0.6 K/UL (ref 0.2–1.2)
BASO+EOS+MONOS # BLD AUTO: 0.6 K/UL (ref 0.2–1.2)
BASO+EOS+MONOS NFR BLD AUTO: 11 % (ref 3.2–16.9)
BASO+EOS+MONOS NFR BLD AUTO: 23 % (ref 3.2–16.9)
BASOPHILS # BLD: 0 K/UL (ref 0–0.1)
BASOPHILS # BLD: 0.1 K/UL (ref 0–0.1)
BASOPHILS NFR BLD: 0 % (ref 0–1)
BASOPHILS NFR BLD: 1 % (ref 0–1)
BILIRUB DIRECT SERPL-MCNC: 0.2 MG/DL (ref 0–0.2)
BILIRUB DIRECT SERPL-MCNC: 0.2 MG/DL (ref 0–0.2)
BILIRUB SERPL-MCNC: 0.5 MG/DL (ref 0.2–1)
BILIRUB SERPL-MCNC: 0.6 MG/DL (ref 0.2–1)
BILIRUB SERPL-MCNC: 0.7 MG/DL (ref 0.2–1)
BILIRUB SERPL-MCNC: 0.8 MG/DL (ref 0.2–1)
BILIRUB SERPL-MCNC: 0.9 MG/DL (ref 0.2–1)
BILIRUB SERPL-MCNC: 1 MG/DL (ref 0.2–1)
BLD PROD TYP BPU: NORMAL
BLOOD BANK CMNT PATIENT-IMP: NORMAL
BPU ID: NORMAL
BUN BLD-MCNC: 14 MG/DL (ref 9–20)
BUN BLD-MCNC: 14 MG/DL (ref 9–20)
BUN SERPL-MCNC: 11 MG/DL (ref 6–20)
BUN SERPL-MCNC: 14 MG/DL (ref 6–20)
BUN SERPL-MCNC: 14 MG/DL (ref 6–20)
BUN SERPL-MCNC: 15 MG/DL (ref 6–20)
BUN SERPL-MCNC: 17 MG/DL (ref 6–20)
BUN SERPL-MCNC: 22 MG/DL (ref 6–20)
BUN SERPL-MCNC: 27 MG/DL (ref 6–20)
BUN/CREAT SERPL: 21 (ref 12–20)
BUN/CREAT SERPL: 22 (ref 12–20)
BUN/CREAT SERPL: 23 (ref 12–20)
BUN/CREAT SERPL: 27 (ref 12–20)
BUN/CREAT SERPL: 28 (ref 12–20)
BUN/CREAT SERPL: 34 (ref 12–20)
BUN/CREAT SERPL: 50 (ref 12–20)
CA-I BLD-MCNC: 1.09 MMOL/L (ref 1.12–1.32)
CA-I BLD-MCNC: 1.28 MMOL/L (ref 1.12–1.32)
CALCIUM SERPL-MCNC: 8.2 MG/DL (ref 8.5–10.1)
CALCIUM SERPL-MCNC: 8.3 MG/DL (ref 8.5–10.1)
CALCIUM SERPL-MCNC: 8.7 MG/DL (ref 8.5–10.1)
CALCIUM SERPL-MCNC: 8.7 MG/DL (ref 8.5–10.1)
CALCIUM SERPL-MCNC: 8.9 MG/DL (ref 8.5–10.1)
CALCIUM SERPL-MCNC: 8.9 MG/DL (ref 8.5–10.1)
CALCIUM SERPL-MCNC: 9.2 MG/DL (ref 8.5–10.1)
CHLORIDE BLD-SCNC: 89 MMOL/L (ref 98–107)
CHLORIDE BLD-SCNC: 90 MMOL/L (ref 98–107)
CHLORIDE SERPL-SCNC: 103 MMOL/L (ref 97–108)
CHLORIDE SERPL-SCNC: 91 MMOL/L (ref 97–108)
CHLORIDE SERPL-SCNC: 92 MMOL/L (ref 97–108)
CHLORIDE SERPL-SCNC: 92 MMOL/L (ref 97–108)
CHLORIDE SERPL-SCNC: 93 MMOL/L (ref 97–108)
CHLORIDE SERPL-SCNC: 95 MMOL/L (ref 97–108)
CHLORIDE SERPL-SCNC: 95 MMOL/L (ref 97–108)
CO2 BLD-SCNC: 26 MMOL/L (ref 21–32)
CO2 BLD-SCNC: 26 MMOL/L (ref 21–32)
CO2 SERPL-SCNC: 24 MMOL/L (ref 21–32)
CO2 SERPL-SCNC: 25 MMOL/L (ref 21–32)
CO2 SERPL-SCNC: 26 MMOL/L (ref 21–32)
CO2 SERPL-SCNC: 27 MMOL/L (ref 21–32)
CO2 SERPL-SCNC: 28 MMOL/L (ref 21–32)
CREAT BLD-MCNC: 0.5 MG/DL (ref 0.6–1.3)
CREAT BLD-MCNC: 0.6 MG/DL (ref 0.6–1.3)
CREAT SERPL-MCNC: 0.51 MG/DL (ref 0.55–1.02)
CREAT SERPL-MCNC: 0.53 MG/DL (ref 0.55–1.02)
CREAT SERPL-MCNC: 0.54 MG/DL (ref 0.55–1.02)
CREAT SERPL-MCNC: 0.61 MG/DL (ref 0.55–1.02)
CREAT SERPL-MCNC: 0.64 MG/DL (ref 0.55–1.02)
CREAT SERPL-MCNC: 0.65 MG/DL (ref 0.55–1.02)
CREAT SERPL-MCNC: 0.65 MG/DL (ref 0.55–1.02)
DIFFERENTIAL METHOD BLD: ABNORMAL
EOSINOPHIL # BLD: 0 K/UL (ref 0–0.4)
EOSINOPHIL # BLD: 0.1 K/UL (ref 0–0.4)
EOSINOPHIL # BLD: 0.1 K/UL (ref 0–0.4)
EOSINOPHIL NFR BLD: 0 % (ref 0–7)
EOSINOPHIL NFR BLD: 1 % (ref 0–7)
EOSINOPHIL NFR BLD: 1 % (ref 0–7)
ERYTHROCYTE [DISTWIDTH] IN BLOOD BY AUTOMATED COUNT: 12.3 % (ref 11.5–14.5)
ERYTHROCYTE [DISTWIDTH] IN BLOOD BY AUTOMATED COUNT: 12.6 % (ref 11.5–14.5)
ERYTHROCYTE [DISTWIDTH] IN BLOOD BY AUTOMATED COUNT: 13.2 % (ref 11.8–15.8)
ERYTHROCYTE [DISTWIDTH] IN BLOOD BY AUTOMATED COUNT: 13.8 % (ref 11.5–14.5)
ERYTHROCYTE [DISTWIDTH] IN BLOOD BY AUTOMATED COUNT: 16.4 % (ref 11.8–15.8)
ERYTHROCYTE [DISTWIDTH] IN BLOOD BY AUTOMATED COUNT: 16.9 % (ref 11.5–14.5)
ERYTHROCYTE [DISTWIDTH] IN BLOOD BY AUTOMATED COUNT: 17.9 % (ref 11.5–14.5)
ERYTHROCYTE [DISTWIDTH] IN BLOOD BY AUTOMATED COUNT: 18.2 % (ref 11.5–14.5)
ERYTHROCYTE [DISTWIDTH] IN BLOOD BY AUTOMATED COUNT: 18.6 % (ref 11.5–14.5)
GLOBULIN SER CALC-MCNC: 2.8 G/DL (ref 2–4)
GLOBULIN SER CALC-MCNC: 3.3 G/DL (ref 2–4)
GLOBULIN SER CALC-MCNC: 3.3 G/DL (ref 2–4)
GLOBULIN SER CALC-MCNC: 3.4 G/DL (ref 2–4)
GLOBULIN SER CALC-MCNC: 3.4 G/DL (ref 2–4)
GLOBULIN SER CALC-MCNC: 3.6 G/DL (ref 2–4)
GLUCOSE BLD-MCNC: 79 MG/DL (ref 65–100)
GLUCOSE BLD-MCNC: 89 MG/DL (ref 65–100)
GLUCOSE SERPL-MCNC: 100 MG/DL (ref 65–100)
GLUCOSE SERPL-MCNC: 75 MG/DL (ref 65–100)
GLUCOSE SERPL-MCNC: 84 MG/DL (ref 65–100)
GLUCOSE SERPL-MCNC: 88 MG/DL (ref 65–100)
GLUCOSE SERPL-MCNC: 88 MG/DL (ref 65–100)
GLUCOSE SERPL-MCNC: 92 MG/DL (ref 65–100)
GLUCOSE SERPL-MCNC: 97 MG/DL (ref 65–100)
HCT VFR BLD AUTO: 25.5 % (ref 35–47)
HCT VFR BLD AUTO: 28.1 % (ref 35–47)
HCT VFR BLD AUTO: 28.8 % (ref 35–47)
HCT VFR BLD AUTO: 30.8 % (ref 35–47)
HCT VFR BLD AUTO: 32.1 % (ref 35–47)
HCT VFR BLD AUTO: 35.3 % (ref 35–47)
HCT VFR BLD AUTO: 38.1 % (ref 35–47)
HCT VFR BLD AUTO: 39.2 % (ref 35–47)
HCT VFR BLD AUTO: 40.5 % (ref 35–47)
HCT VFR BLD CALC: 37 % (ref 35–47)
HCT VFR BLD CALC: 43 % (ref 35–47)
HGB BLD-MCNC: 10 G/DL (ref 11.5–16)
HGB BLD-MCNC: 10.1 G/DL (ref 11.5–16)
HGB BLD-MCNC: 10.9 G/DL (ref 11.5–16)
HGB BLD-MCNC: 11.7 G/DL (ref 11.5–16)
HGB BLD-MCNC: 12.4 G/DL (ref 11.5–16)
HGB BLD-MCNC: 12.8 G/DL (ref 11.5–16)
HGB BLD-MCNC: 13.3 G/DL (ref 11.5–16)
HGB BLD-MCNC: 14.4 G/DL (ref 11.5–16)
HGB BLD-MCNC: 8.7 G/DL (ref 11.5–16)
IMM GRANULOCYTES # BLD AUTO: 0 K/UL
IMM GRANULOCYTES # BLD AUTO: 0 K/UL (ref 0–0.04)
IMM GRANULOCYTES # BLD AUTO: 0.1 K/UL (ref 0–0.04)
IMM GRANULOCYTES NFR BLD AUTO: 0 %
IMM GRANULOCYTES NFR BLD AUTO: 0 % (ref 0–0.5)
IMM GRANULOCYTES NFR BLD AUTO: 1 % (ref 0–0.5)
LYMPHOCYTES # BLD: 0.4 K/UL (ref 0.8–3.5)
LYMPHOCYTES # BLD: 0.5 K/UL (ref 0.8–3.5)
LYMPHOCYTES # BLD: 0.6 K/UL (ref 0.8–3.5)
LYMPHOCYTES # BLD: 0.7 K/UL (ref 0.8–3.5)
LYMPHOCYTES # BLD: 0.8 K/UL (ref 0.8–3.5)
LYMPHOCYTES # BLD: 0.8 K/UL (ref 0.8–3.5)
LYMPHOCYTES # BLD: 1.5 K/UL (ref 0.8–3.5)
LYMPHOCYTES NFR BLD: 10 % (ref 12–49)
LYMPHOCYTES NFR BLD: 12 % (ref 12–49)
LYMPHOCYTES NFR BLD: 12 % (ref 12–49)
LYMPHOCYTES NFR BLD: 14 % (ref 12–49)
LYMPHOCYTES NFR BLD: 14 % (ref 12–49)
LYMPHOCYTES NFR BLD: 15 % (ref 12–49)
LYMPHOCYTES NFR BLD: 4 % (ref 12–49)
LYMPHOCYTES NFR BLD: 8 % (ref 12–49)
LYMPHOCYTES NFR BLD: 8 % (ref 12–49)
MCH RBC QN AUTO: 29.8 PG (ref 26–34)
MCH RBC QN AUTO: 30.2 PG (ref 26–34)
MCH RBC QN AUTO: 30.3 PG (ref 26–34)
MCH RBC QN AUTO: 30.8 PG (ref 26–34)
MCH RBC QN AUTO: 31.6 PG (ref 26–34)
MCH RBC QN AUTO: 31.8 PG (ref 26–34)
MCH RBC QN AUTO: 32 PG (ref 26–34)
MCH RBC QN AUTO: 32.3 PG (ref 26–34)
MCH RBC QN AUTO: 32.6 PG (ref 26–34)
MCHC RBC AUTO-ENTMCNC: 33.6 G/DL (ref 30–36.5)
MCHC RBC AUTO-ENTMCNC: 33.9 G/DL (ref 30–36.5)
MCHC RBC AUTO-ENTMCNC: 34.1 G/DL (ref 30–36.5)
MCHC RBC AUTO-ENTMCNC: 34.7 G/DL (ref 30–36.5)
MCHC RBC AUTO-ENTMCNC: 35.1 G/DL (ref 30–36.5)
MCHC RBC AUTO-ENTMCNC: 35.4 G/DL (ref 30–36.5)
MCHC RBC AUTO-ENTMCNC: 35.6 G/DL (ref 30–36.5)
MCHC RBC AUTO-ENTMCNC: 35.9 G/DL (ref 30–36.5)
MCHC RBC AUTO-ENTMCNC: 36.4 G/DL (ref 30–36.5)
MCV RBC AUTO: 86.1 FL (ref 80–99)
MCV RBC AUTO: 86.7 FL (ref 80–99)
MCV RBC AUTO: 87.7 FL (ref 80–99)
MCV RBC AUTO: 87.9 FL (ref 80–99)
MCV RBC AUTO: 89.8 FL (ref 80–99)
MCV RBC AUTO: 89.8 FL (ref 80–99)
MCV RBC AUTO: 90.3 FL (ref 80–99)
MCV RBC AUTO: 92.7 FL (ref 80–99)
MCV RBC AUTO: 93.8 FL (ref 80–99)
METAMYELOCYTES NFR BLD MANUAL: 2 %
METAMYELOCYTES NFR BLD MANUAL: 3 %
METAMYELOCYTES NFR BLD MANUAL: 4 %
METAMYELOCYTES NFR BLD MANUAL: 5 %
MONOCYTES # BLD: 0.3 K/UL (ref 0–1)
MONOCYTES # BLD: 0.4 K/UL (ref 0–1)
MONOCYTES # BLD: 0.5 K/UL (ref 0–1)
MONOCYTES # BLD: 0.6 K/UL (ref 0–1)
MONOCYTES # BLD: 0.6 K/UL (ref 0–1)
MONOCYTES # BLD: 0.7 K/UL (ref 0–1)
MONOCYTES # BLD: 0.8 K/UL (ref 0–1)
MONOCYTES NFR BLD: 11 % (ref 5–13)
MONOCYTES NFR BLD: 11 % (ref 5–13)
MONOCYTES NFR BLD: 15 % (ref 5–13)
MONOCYTES NFR BLD: 16 % (ref 5–13)
MONOCYTES NFR BLD: 3 % (ref 5–13)
MONOCYTES NFR BLD: 3 % (ref 5–13)
MONOCYTES NFR BLD: 7 % (ref 5–13)
MYELOCYTES NFR BLD MANUAL: 1 %
MYELOCYTES NFR BLD MANUAL: 1 %
MYELOCYTES NFR BLD MANUAL: 2 %
MYELOCYTES NFR BLD MANUAL: 2 %
NEUTS BAND NFR BLD MANUAL: 1 %
NEUTS BAND NFR BLD MANUAL: 5 %
NEUTS BAND NFR BLD MANUAL: 6 %
NEUTS BAND NFR BLD MANUAL: 6 % (ref 0–6)
NEUTS BAND NFR BLD MANUAL: 7 %
NEUTS SEG # BLD: 1.8 K/UL (ref 1.8–8)
NEUTS SEG # BLD: 12.3 K/UL (ref 1.8–8)
NEUTS SEG # BLD: 2.3 K/UL (ref 1.8–8)
NEUTS SEG # BLD: 3.4 K/UL (ref 1.8–8)
NEUTS SEG # BLD: 3.8 K/UL (ref 1.8–8)
NEUTS SEG # BLD: 4.4 K/UL (ref 1.8–8)
NEUTS SEG # BLD: 4.7 K/UL (ref 1.8–8)
NEUTS SEG # BLD: 7.7 K/UL (ref 1.8–8)
NEUTS SEG # BLD: 8.1 K/UL (ref 1.8–8)
NEUTS SEG NFR BLD: 61 % (ref 32–75)
NEUTS SEG NFR BLD: 62 % (ref 32–75)
NEUTS SEG NFR BLD: 71 % (ref 32–75)
NEUTS SEG NFR BLD: 72 % (ref 32–75)
NEUTS SEG NFR BLD: 72 % (ref 32–75)
NEUTS SEG NFR BLD: 77 % (ref 32–75)
NEUTS SEG NFR BLD: 77 % (ref 32–75)
NEUTS SEG NFR BLD: 79 % (ref 32–75)
NEUTS SEG NFR BLD: 81 % (ref 32–75)
NRBC # BLD: 0 K/UL (ref 0–0.01)
NRBC # BLD: 0.04 K/UL (ref 0–0.01)
NRBC # BLD: 0.11 K/UL (ref 0–0.01)
NRBC # BLD: 0.19 K/UL (ref 0–0.01)
NRBC BLD-RTO: 0 PER 100 WBC
NRBC BLD-RTO: 0.4 PER 100 WBC
NRBC BLD-RTO: 0.7 PER 100 WBC
NRBC BLD-RTO: 1.9 PER 100 WBC
OSMOLALITY SERPL: 253 MOSM/KG H2O
OSMOLALITY UR: 730 MOSM/KG H2O
PATH REV BLD -IMP: ABNORMAL
PLATELET # BLD AUTO: 10 K/UL (ref 150–400)
PLATELET # BLD AUTO: 143 K/UL (ref 150–400)
PLATELET # BLD AUTO: 143 K/UL (ref 150–400)
PLATELET # BLD AUTO: 150 K/UL (ref 150–400)
PLATELET # BLD AUTO: 162 K/UL (ref 150–400)
PLATELET # BLD AUTO: 177 K/UL (ref 150–400)
PLATELET # BLD AUTO: 189 K/UL (ref 150–400)
PLATELET # BLD AUTO: 4 K/UL (ref 150–400)
PLATELET # BLD AUTO: 69 K/UL (ref 150–400)
PLATELET COMMENTS,PCOM: ABNORMAL
PMV BLD AUTO: 10.1 FL (ref 8.9–12.9)
PMV BLD AUTO: 10.8 FL (ref 8.9–12.9)
PMV BLD AUTO: 8.8 FL (ref 8.9–12.9)
PMV BLD AUTO: 9.2 FL (ref 8.9–12.9)
PMV BLD AUTO: 9.4 FL (ref 8.9–12.9)
PMV BLD AUTO: ABNORMAL FL (ref 8.9–12.9)
PMV BLD AUTO: ABNORMAL FL (ref 8.9–12.9)
POTASSIUM BLD-SCNC: 4 MMOL/L (ref 3.5–5.1)
POTASSIUM BLD-SCNC: 4 MMOL/L (ref 3.5–5.1)
POTASSIUM SERPL-SCNC: 3.3 MMOL/L (ref 3.5–5.1)
POTASSIUM SERPL-SCNC: 3.7 MMOL/L (ref 3.5–5.1)
POTASSIUM SERPL-SCNC: 3.8 MMOL/L (ref 3.5–5.1)
POTASSIUM SERPL-SCNC: 3.8 MMOL/L (ref 3.5–5.1)
POTASSIUM SERPL-SCNC: 4 MMOL/L (ref 3.5–5.1)
POTASSIUM SERPL-SCNC: 4 MMOL/L (ref 3.5–5.1)
POTASSIUM SERPL-SCNC: 4.1 MMOL/L (ref 3.5–5.1)
PROT SERPL-MCNC: 6.2 G/DL (ref 6.4–8.2)
PROT SERPL-MCNC: 6.6 G/DL (ref 6.4–8.2)
PROT SERPL-MCNC: 6.6 G/DL (ref 6.4–8.2)
PROT SERPL-MCNC: 6.8 G/DL (ref 6.4–8.2)
PROT SERPL-MCNC: 6.8 G/DL (ref 6.4–8.2)
PROT SERPL-MCNC: 6.9 G/DL (ref 6.4–8.2)
PROT SERPL-MCNC: 6.9 G/DL (ref 6.4–8.2)
PROT SERPL-MCNC: 7.2 G/DL (ref 6.4–8.2)
RBC # BLD AUTO: 2.75 M/UL (ref 3.8–5.2)
RBC # BLD AUTO: 3.07 M/UL (ref 3.8–5.2)
RBC # BLD AUTO: 3.13 M/UL (ref 3.8–5.2)
RBC # BLD AUTO: 3.43 M/UL (ref 3.8–5.2)
RBC # BLD AUTO: 3.66 M/UL (ref 3.8–5.2)
RBC # BLD AUTO: 4.1 M/UL (ref 3.8–5.2)
RBC # BLD AUTO: 4.22 M/UL (ref 3.8–5.2)
RBC # BLD AUTO: 4.46 M/UL (ref 3.8–5.2)
RBC # BLD AUTO: 4.67 M/UL (ref 3.8–5.2)
RBC MORPH BLD: ABNORMAL
SERVICE CMNT-IMP: ABNORMAL
SERVICE CMNT-IMP: ABNORMAL
SODIUM BLD-SCNC: 124 MMOL/L (ref 136–145)
SODIUM BLD-SCNC: 124 MMOL/L (ref 136–145)
SODIUM SERPL-SCNC: 123 MMOL/L (ref 136–145)
SODIUM SERPL-SCNC: 124 MMOL/L (ref 136–145)
SODIUM SERPL-SCNC: 124 MMOL/L (ref 136–145)
SODIUM SERPL-SCNC: 127 MMOL/L (ref 136–145)
SODIUM SERPL-SCNC: 128 MMOL/L (ref 136–145)
SODIUM SERPL-SCNC: 129 MMOL/L (ref 136–145)
SODIUM SERPL-SCNC: 134 MMOL/L (ref 136–145)
SODIUM UR-SCNC: 234 MMOL/L
STATUS OF UNIT,%ST: NORMAL
TSH SERPL DL<=0.05 MIU/L-ACNC: 1.52 UIU/ML (ref 0.36–3.74)
TSH SERPL DL<=0.05 MIU/L-ACNC: 2.56 UIU/ML (ref 0.36–3.74)
UNIT DIVISION, %UDIV: 0
WBC # BLD AUTO: 10.2 K/UL (ref 3.6–11)
WBC # BLD AUTO: 14.8 K/UL (ref 3.6–11)
WBC # BLD AUTO: 2.8 K/UL (ref 3.6–11)
WBC # BLD AUTO: 3.4 K/UL (ref 3.6–11)
WBC # BLD AUTO: 4.9 K/UL (ref 3.6–11)
WBC # BLD AUTO: 5.5 K/UL (ref 3.6–11)
WBC # BLD AUTO: 5.5 K/UL (ref 3.6–11)
WBC # BLD AUTO: 6 K/UL (ref 3.6–11)
WBC # BLD AUTO: 9 K/UL (ref 3.6–11)
WBC MORPH BLD: ABNORMAL

## 2021-01-01 PROCEDURE — 36415 COLL VENOUS BLD VENIPUNCTURE: CPT

## 2021-01-01 PROCEDURE — G0463 HOSPITAL OUTPT CLINIC VISIT: HCPCS | Performed by: NURSE PRACTITIONER

## 2021-01-01 PROCEDURE — 77030012965 HC NDL HUBR BBMI -A

## 2021-01-01 PROCEDURE — 74177 CT ABD & PELVIS W/CONTRAST: CPT

## 2021-01-01 PROCEDURE — 74011000250 HC RX REV CODE- 250: Performed by: INTERNAL MEDICINE

## 2021-01-01 PROCEDURE — G8536 NO DOC ELDER MAL SCRN: HCPCS | Performed by: INTERNAL MEDICINE

## 2021-01-01 PROCEDURE — 80048 BASIC METABOLIC PNL TOTAL CA: CPT

## 2021-01-01 PROCEDURE — 74011250636 HC RX REV CODE- 250/636: Performed by: INTERNAL MEDICINE

## 2021-01-01 PROCEDURE — 1090F PRES/ABSN URINE INCON ASSESS: CPT | Performed by: INTERNAL MEDICINE

## 2021-01-01 PROCEDURE — 99215 OFFICE O/P EST HI 40 MIN: CPT | Performed by: INTERNAL MEDICINE

## 2021-01-01 PROCEDURE — 96374 THER/PROPH/DIAG INJ IV PUSH: CPT

## 2021-01-01 PROCEDURE — 80076 HEPATIC FUNCTION PANEL: CPT

## 2021-01-01 PROCEDURE — 84300 ASSAY OF URINE SODIUM: CPT

## 2021-01-01 PROCEDURE — 74011000258 HC RX REV CODE- 258: Performed by: INTERNAL MEDICINE

## 2021-01-01 PROCEDURE — G8510 SCR DEP NEG, NO PLAN REQD: HCPCS | Performed by: INTERNAL MEDICINE

## 2021-01-01 PROCEDURE — 85025 COMPLETE CBC W/AUTO DIFF WBC: CPT

## 2021-01-01 PROCEDURE — G8420 CALC BMI NORM PARAMETERS: HCPCS | Performed by: INTERNAL MEDICINE

## 2021-01-01 PROCEDURE — G8432 DEP SCR NOT DOC, RNG: HCPCS | Performed by: INTERNAL MEDICINE

## 2021-01-01 PROCEDURE — 96413 CHEMO IV INFUSION 1 HR: CPT

## 2021-01-01 PROCEDURE — 3017F COLORECTAL CA SCREEN DOC REV: CPT | Performed by: INTERNAL MEDICINE

## 2021-01-01 PROCEDURE — A9575 INJ GADOTERATE MEGLUMI 0.1ML: HCPCS | Performed by: NURSE PRACTITIONER

## 2021-01-01 PROCEDURE — 74011636320 HC RX REV CODE- 636/320: Performed by: NURSE PRACTITIONER

## 2021-01-01 PROCEDURE — 80053 COMPREHEN METABOLIC PANEL: CPT

## 2021-01-01 PROCEDURE — 1101F PT FALLS ASSESS-DOCD LE1/YR: CPT | Performed by: INTERNAL MEDICINE

## 2021-01-01 PROCEDURE — G8427 DOCREV CUR MEDS BY ELIG CLIN: HCPCS | Performed by: INTERNAL MEDICINE

## 2021-01-01 PROCEDURE — G8399 PT W/DXA RESULTS DOCUMENT: HCPCS | Performed by: INTERNAL MEDICINE

## 2021-01-01 PROCEDURE — 78306 BONE IMAGING WHOLE BODY: CPT

## 2021-01-01 PROCEDURE — 83935 ASSAY OF URINE OSMOLALITY: CPT

## 2021-01-01 PROCEDURE — 74011000636 HC RX REV CODE- 636: Performed by: INTERNAL MEDICINE

## 2021-01-01 PROCEDURE — 71260 CT THORAX DX C+: CPT

## 2021-01-01 PROCEDURE — 70553 MRI BRAIN STEM W/O & W/DYE: CPT

## 2021-01-01 PROCEDURE — 80047 BASIC METABLC PNL IONIZED CA: CPT

## 2021-01-01 PROCEDURE — 83930 ASSAY OF BLOOD OSMOLALITY: CPT

## 2021-01-01 PROCEDURE — 86900 BLOOD TYPING SEROLOGIC ABO: CPT

## 2021-01-01 PROCEDURE — 72157 MRI CHEST SPINE W/O & W/DYE: CPT

## 2021-01-01 PROCEDURE — 96375 TX/PRO/DX INJ NEW DRUG ADDON: CPT

## 2021-01-01 PROCEDURE — 36591 DRAW BLOOD OFF VENOUS DEVICE: CPT

## 2021-01-01 PROCEDURE — P9073 PLATELETS PHERESIS PATH REDU: HCPCS

## 2021-01-01 PROCEDURE — 72197 MRI PELVIS W/O & W/DYE: CPT

## 2021-01-01 PROCEDURE — 72158 MRI LUMBAR SPINE W/O & W/DYE: CPT

## 2021-01-01 PROCEDURE — 36430 TRANSFUSION BLD/BLD COMPNT: CPT

## 2021-01-01 PROCEDURE — 84443 ASSAY THYROID STIM HORMONE: CPT

## 2021-01-01 PROCEDURE — A9576 INJ PROHANCE MULTIPACK: HCPCS | Performed by: NURSE PRACTITIONER

## 2021-01-01 PROCEDURE — 77030013169 SET IV BLD ICUM -A

## 2021-01-01 PROCEDURE — 74011250636 HC RX REV CODE- 250/636: Performed by: NURSE PRACTITIONER

## 2021-01-01 PROCEDURE — 74011250637 HC RX REV CODE- 250/637: Performed by: NURSE PRACTITIONER

## 2021-01-01 RX ORDER — DIPHENHYDRAMINE HYDROCHLORIDE 50 MG/ML
50 INJECTION, SOLUTION INTRAMUSCULAR; INTRAVENOUS AS NEEDED
Status: CANCELLED
Start: 2021-01-01

## 2021-01-01 RX ORDER — DIPHENHYDRAMINE HYDROCHLORIDE 50 MG/ML
25 INJECTION, SOLUTION INTRAMUSCULAR; INTRAVENOUS AS NEEDED
Status: CANCELLED
Start: 2021-01-01

## 2021-01-01 RX ORDER — SODIUM CHLORIDE 0.9 % (FLUSH) 0.9 %
10 SYRINGE (ML) INJECTION AS NEEDED
Status: CANCELLED | OUTPATIENT
Start: 2021-01-01 | End: 2021-01-01

## 2021-01-01 RX ORDER — HYDROCORTISONE SODIUM SUCCINATE 100 MG/2ML
100 INJECTION, POWDER, FOR SOLUTION INTRAMUSCULAR; INTRAVENOUS AS NEEDED
Status: CANCELLED | OUTPATIENT
Start: 2021-01-01

## 2021-01-01 RX ORDER — HEPARIN 100 UNIT/ML
300-500 SYRINGE INTRAVENOUS AS NEEDED
Status: ACTIVE | OUTPATIENT
Start: 2021-01-01 | End: 2021-01-01

## 2021-01-01 RX ORDER — DEXAMETHASONE SODIUM PHOSPHATE 4 MG/ML
8 INJECTION, SOLUTION INTRA-ARTICULAR; INTRALESIONAL; INTRAMUSCULAR; INTRAVENOUS; SOFT TISSUE ONCE
Status: COMPLETED | OUTPATIENT
Start: 2021-01-01 | End: 2021-01-01

## 2021-01-01 RX ORDER — SODIUM CHLORIDE 9 MG/ML
10 INJECTION INTRAMUSCULAR; INTRAVENOUS; SUBCUTANEOUS AS NEEDED
Status: CANCELLED | OUTPATIENT
Start: 2021-01-01

## 2021-01-01 RX ORDER — SODIUM CHLORIDE 0.9 % (FLUSH) 0.9 %
10 SYRINGE (ML) INJECTION AS NEEDED
Status: CANCELLED | OUTPATIENT
Start: 2021-06-17

## 2021-01-01 RX ORDER — SODIUM CHLORIDE 9 MG/ML
25 INJECTION, SOLUTION INTRAVENOUS CONTINUOUS
Status: CANCELLED | OUTPATIENT
Start: 2021-01-01

## 2021-01-01 RX ORDER — HEPARIN 100 UNIT/ML
300-500 SYRINGE INTRAVENOUS AS NEEDED
Status: CANCELLED
Start: 2021-01-01

## 2021-01-01 RX ORDER — EPINEPHRINE 1 MG/ML
0.3 INJECTION, SOLUTION, CONCENTRATE INTRAVENOUS AS NEEDED
Status: CANCELLED | OUTPATIENT
Start: 2021-01-01

## 2021-01-01 RX ORDER — IBUPROFEN 400 MG/1
400 TABLET ORAL
Status: CANCELLED | OUTPATIENT
Start: 2021-01-01

## 2021-01-01 RX ORDER — PALONOSETRON 0.05 MG/ML
0.25 INJECTION, SOLUTION INTRAVENOUS ONCE
Status: CANCELLED | OUTPATIENT
Start: 2021-01-01 | End: 2021-01-01

## 2021-01-01 RX ORDER — SODIUM CHLORIDE 9 MG/ML
25 INJECTION, SOLUTION INTRAVENOUS CONTINUOUS
Status: CANCELLED | OUTPATIENT
Start: 2021-01-01 | End: 2021-01-01

## 2021-01-01 RX ORDER — ALBUTEROL SULFATE 0.83 MG/ML
2.5 SOLUTION RESPIRATORY (INHALATION) AS NEEDED
Status: CANCELLED
Start: 2021-01-01

## 2021-01-01 RX ORDER — ONDANSETRON 2 MG/ML
8 INJECTION INTRAMUSCULAR; INTRAVENOUS AS NEEDED
Status: CANCELLED | OUTPATIENT
Start: 2021-01-01

## 2021-01-01 RX ORDER — ACETAMINOPHEN 325 MG/1
650 TABLET ORAL AS NEEDED
Status: CANCELLED
Start: 2021-01-01

## 2021-01-01 RX ORDER — SODIUM CHLORIDE 0.9 % (FLUSH) 0.9 %
10 SYRINGE (ML) INJECTION AS NEEDED
Status: DISPENSED | OUTPATIENT
Start: 2021-01-01 | End: 2021-01-01

## 2021-01-01 RX ORDER — SODIUM CHLORIDE 0.9 % (FLUSH) 0.9 %
10 SYRINGE (ML) INJECTION AS NEEDED
Status: CANCELLED | OUTPATIENT
Start: 2021-06-24

## 2021-01-01 RX ORDER — SODIUM CHLORIDE 9 MG/ML
25 INJECTION, SOLUTION INTRAVENOUS CONTINUOUS
Status: CANCELLED | OUTPATIENT
Start: 2021-06-17

## 2021-01-01 RX ORDER — PALONOSETRON 0.05 MG/ML
0.25 INJECTION, SOLUTION INTRAVENOUS ONCE
Status: COMPLETED | OUTPATIENT
Start: 2021-01-01 | End: 2021-01-01

## 2021-01-01 RX ORDER — EPINEPHRINE 1 MG/ML
0.3 INJECTION, SOLUTION, CONCENTRATE INTRAVENOUS AS NEEDED
Status: CANCELLED | OUTPATIENT
Start: 2021-06-24

## 2021-01-01 RX ORDER — SODIUM CHLORIDE 9 MG/ML
10 INJECTION INTRAMUSCULAR; INTRAVENOUS; SUBCUTANEOUS AS NEEDED
Status: DISPENSED | OUTPATIENT
Start: 2021-01-01 | End: 2021-01-01

## 2021-01-01 RX ORDER — DEXAMETHASONE SODIUM PHOSPHATE 4 MG/ML
8 INJECTION, SOLUTION INTRA-ARTICULAR; INTRALESIONAL; INTRAMUSCULAR; INTRAVENOUS; SOFT TISSUE ONCE
Status: CANCELLED | OUTPATIENT
Start: 2021-01-01 | End: 2021-01-01

## 2021-01-01 RX ORDER — ACETAMINOPHEN 325 MG/1
650 TABLET ORAL AS NEEDED
Status: CANCELLED
Start: 2021-06-17

## 2021-01-01 RX ORDER — LOSARTAN POTASSIUM 25 MG/1
TABLET ORAL DAILY
COMMUNITY

## 2021-01-01 RX ORDER — DIPHENHYDRAMINE HYDROCHLORIDE 50 MG/ML
25 INJECTION, SOLUTION INTRAMUSCULAR; INTRAVENOUS AS NEEDED
Status: CANCELLED
Start: 2021-06-24

## 2021-01-01 RX ORDER — ACETAMINOPHEN 325 MG/1
650 TABLET ORAL ONCE
Status: COMPLETED | OUTPATIENT
Start: 2021-01-01 | End: 2021-01-01

## 2021-01-01 RX ORDER — SODIUM CHLORIDE 9 MG/ML
25 INJECTION, SOLUTION INTRAVENOUS CONTINUOUS
Status: CANCELLED | OUTPATIENT
Start: 2021-06-24

## 2021-01-01 RX ORDER — SODIUM CHLORIDE 9 MG/ML
25 INJECTION, SOLUTION INTRAVENOUS CONTINUOUS
Status: DISPENSED | OUTPATIENT
Start: 2021-01-01 | End: 2021-01-01

## 2021-01-01 RX ORDER — PALONOSETRON 0.05 MG/ML
0.25 INJECTION, SOLUTION INTRAVENOUS ONCE
Status: CANCELLED | OUTPATIENT
Start: 2021-06-24 | End: 2021-06-17

## 2021-01-01 RX ORDER — ONDANSETRON 2 MG/ML
8 INJECTION INTRAMUSCULAR; INTRAVENOUS AS NEEDED
Status: CANCELLED | OUTPATIENT
Start: 2021-06-17

## 2021-01-01 RX ORDER — HEPARIN 100 UNIT/ML
300-500 SYRINGE INTRAVENOUS AS NEEDED
Status: CANCELLED | OUTPATIENT
Start: 2021-01-01

## 2021-01-01 RX ORDER — SODIUM CHLORIDE 9 MG/ML
250 INJECTION, SOLUTION INTRAVENOUS AS NEEDED
Status: DISCONTINUED | OUTPATIENT
Start: 2021-01-01 | End: 2021-01-01 | Stop reason: HOSPADM

## 2021-01-01 RX ORDER — SODIUM CHLORIDE 0.9 % (FLUSH) 0.9 %
5-10 SYRINGE (ML) INJECTION AS NEEDED
Status: CANCELLED | OUTPATIENT
Start: 2021-01-01

## 2021-01-01 RX ORDER — SODIUM CHLORIDE 0.9 % (FLUSH) 0.9 %
10 SYRINGE (ML) INJECTION AS NEEDED
Status: CANCELLED
Start: 2021-01-01

## 2021-01-01 RX ORDER — DIPHENHYDRAMINE HYDROCHLORIDE 50 MG/ML
25 INJECTION, SOLUTION INTRAMUSCULAR; INTRAVENOUS AS NEEDED
Status: CANCELLED
Start: 2021-06-17

## 2021-01-01 RX ORDER — DEXAMETHASONE SODIUM PHOSPHATE 4 MG/ML
8 INJECTION, SOLUTION INTRA-ARTICULAR; INTRALESIONAL; INTRAMUSCULAR; INTRAVENOUS; SOFT TISSUE ONCE
Status: CANCELLED | OUTPATIENT
Start: 2021-06-24 | End: 2021-06-17

## 2021-01-01 RX ORDER — SODIUM CHLORIDE 0.9 % (FLUSH) 0.9 %
10 SYRINGE (ML) INJECTION AS NEEDED
Status: DISCONTINUED | OUTPATIENT
Start: 2021-01-01 | End: 2021-01-01 | Stop reason: HOSPADM

## 2021-01-01 RX ORDER — SODIUM CHLORIDE 9 MG/ML
10 INJECTION INTRAMUSCULAR; INTRAVENOUS; SUBCUTANEOUS AS NEEDED
Status: ACTIVE | OUTPATIENT
Start: 2021-01-01 | End: 2021-01-01

## 2021-01-01 RX ORDER — TRAMADOL HYDROCHLORIDE 50 MG/1
50 TABLET ORAL
Qty: 30 TABLET | Refills: 0 | Status: SHIPPED | OUTPATIENT
Start: 2021-01-01 | End: 2021-01-01

## 2021-01-01 RX ORDER — SODIUM CHLORIDE 9 MG/ML
10 INJECTION INTRAMUSCULAR; INTRAVENOUS; SUBCUTANEOUS AS NEEDED
Status: DISCONTINUED | OUTPATIENT
Start: 2021-01-01 | End: 2021-01-01 | Stop reason: HOSPADM

## 2021-01-01 RX ORDER — HEPARIN 100 UNIT/ML
300-500 SYRINGE INTRAVENOUS AS NEEDED
Status: CANCELLED
Start: 2021-06-24

## 2021-01-01 RX ORDER — SODIUM CHLORIDE 9 MG/ML
25 INJECTION, SOLUTION INTRAVENOUS CONTINUOUS
Status: DISCONTINUED | OUTPATIENT
Start: 2021-01-01 | End: 2021-01-01 | Stop reason: HOSPADM

## 2021-01-01 RX ORDER — SODIUM CHLORIDE 0.9 % (FLUSH) 0.9 %
5-10 SYRINGE (ML) INJECTION AS NEEDED
Status: DISPENSED | OUTPATIENT
Start: 2021-01-01 | End: 2021-01-01

## 2021-01-01 RX ORDER — HEPARIN 100 UNIT/ML
500 SYRINGE INTRAVENOUS AS NEEDED
Status: CANCELLED | OUTPATIENT
Start: 2021-01-01

## 2021-01-01 RX ORDER — HEPARIN 100 UNIT/ML
300-500 SYRINGE INTRAVENOUS AS NEEDED
Status: DISCONTINUED | OUTPATIENT
Start: 2021-01-01 | End: 2021-01-01 | Stop reason: HOSPADM

## 2021-01-01 RX ORDER — HEPARIN 100 UNIT/ML
300-500 SYRINGE INTRAVENOUS AS NEEDED
Status: CANCELLED
Start: 2021-06-17

## 2021-01-01 RX ORDER — DIPHENHYDRAMINE HYDROCHLORIDE 50 MG/ML
50 INJECTION, SOLUTION INTRAMUSCULAR; INTRAVENOUS
Status: CANCELLED | OUTPATIENT
Start: 2021-01-01

## 2021-01-01 RX ORDER — SODIUM CHLORIDE 9 MG/ML
250 INJECTION, SOLUTION INTRAVENOUS AS NEEDED
Status: CANCELLED | OUTPATIENT
Start: 2021-01-01

## 2021-01-01 RX ORDER — SODIUM CHLORIDE 9 MG/ML
INJECTION INTRAMUSCULAR; INTRAVENOUS; SUBCUTANEOUS
Status: DISPENSED
Start: 2021-01-01 | End: 2021-01-01

## 2021-01-01 RX ORDER — HEPARIN 100 UNIT/ML
500 SYRINGE INTRAVENOUS AS NEEDED
Status: ACTIVE | OUTPATIENT
Start: 2021-01-01 | End: 2021-01-01

## 2021-01-01 RX ORDER — ATORVASTATIN CALCIUM 10 MG/1
TABLET, FILM COATED ORAL DAILY
COMMUNITY

## 2021-01-01 RX ORDER — ONDANSETRON 2 MG/ML
8 INJECTION INTRAMUSCULAR; INTRAVENOUS AS NEEDED
Status: CANCELLED | OUTPATIENT
Start: 2021-06-24

## 2021-01-01 RX ORDER — HYDROCORTISONE SODIUM SUCCINATE 100 MG/2ML
100 INJECTION, POWDER, FOR SOLUTION INTRAMUSCULAR; INTRAVENOUS AS NEEDED
Status: CANCELLED | OUTPATIENT
Start: 2021-06-17

## 2021-01-01 RX ORDER — EPINEPHRINE 1 MG/ML
0.3 INJECTION, SOLUTION, CONCENTRATE INTRAVENOUS AS NEEDED
Status: CANCELLED | OUTPATIENT
Start: 2021-06-17

## 2021-01-01 RX ORDER — DIPHENHYDRAMINE HCL 25 MG
25 CAPSULE ORAL ONCE
Status: COMPLETED | OUTPATIENT
Start: 2021-01-01 | End: 2021-01-01

## 2021-01-01 RX ORDER — HYDROCORTISONE SODIUM SUCCINATE 100 MG/2ML
100 INJECTION, POWDER, FOR SOLUTION INTRAMUSCULAR; INTRAVENOUS AS NEEDED
Status: CANCELLED | OUTPATIENT
Start: 2021-06-24

## 2021-01-01 RX ORDER — DEXAMETHASONE SODIUM PHOSPHATE 4 MG/ML
8 INJECTION, SOLUTION INTRA-ARTICULAR; INTRALESIONAL; INTRAMUSCULAR; INTRAVENOUS; SOFT TISSUE ONCE
Status: DISCONTINUED | OUTPATIENT
Start: 2021-01-01 | End: 2021-01-01

## 2021-01-01 RX ORDER — HEPARIN 100 UNIT/ML
500 SYRINGE INTRAVENOUS AS NEEDED
Status: DISCONTINUED | OUTPATIENT
Start: 2021-01-01 | End: 2021-01-01 | Stop reason: HOSPADM

## 2021-01-01 RX ORDER — ALBUTEROL SULFATE 0.83 MG/ML
2.5 SOLUTION RESPIRATORY (INHALATION) AS NEEDED
Status: CANCELLED
Start: 2021-06-17

## 2021-01-01 RX ORDER — PALONOSETRON 0.05 MG/ML
0.25 INJECTION, SOLUTION INTRAVENOUS ONCE
Status: DISCONTINUED | OUTPATIENT
Start: 2021-01-01 | End: 2021-01-01

## 2021-01-01 RX ORDER — ALBUTEROL SULFATE 0.83 MG/ML
2.5 SOLUTION RESPIRATORY (INHALATION) AS NEEDED
Status: CANCELLED
Start: 2021-06-24

## 2021-01-01 RX ORDER — ACETAMINOPHEN 325 MG/1
650 TABLET ORAL AS NEEDED
Status: CANCELLED
Start: 2021-06-24

## 2021-01-01 RX ORDER — HEPARIN 100 UNIT/ML
300-500 SYRINGE INTRAVENOUS AS NEEDED
Status: DISPENSED | OUTPATIENT
Start: 2021-01-01 | End: 2021-01-01

## 2021-01-01 RX ORDER — DIPHENHYDRAMINE HYDROCHLORIDE 50 MG/ML
50 INJECTION, SOLUTION INTRAMUSCULAR; INTRAVENOUS AS NEEDED
Status: CANCELLED
Start: 2021-06-24

## 2021-01-01 RX ORDER — GADOTERATE MEGLUMINE 376.9 MG/ML
10 INJECTION INTRAVENOUS
Status: COMPLETED | OUTPATIENT
Start: 2021-01-01 | End: 2021-01-01

## 2021-01-01 RX ORDER — SODIUM CHLORIDE 9 MG/ML
10 INJECTION INTRAMUSCULAR; INTRAVENOUS; SUBCUTANEOUS AS NEEDED
Status: CANCELLED | OUTPATIENT
Start: 2021-06-24

## 2021-01-01 RX ORDER — SODIUM CHLORIDE 9 MG/ML
10 INJECTION INTRAMUSCULAR; INTRAVENOUS; SUBCUTANEOUS AS NEEDED
Status: CANCELLED | OUTPATIENT
Start: 2021-06-17

## 2021-01-01 RX ORDER — DIPHENHYDRAMINE HYDROCHLORIDE 50 MG/ML
50 INJECTION, SOLUTION INTRAMUSCULAR; INTRAVENOUS AS NEEDED
Status: CANCELLED
Start: 2021-06-17

## 2021-01-01 RX ORDER — METHYLPREDNISOLONE 4 MG/1
TABLET ORAL
COMMUNITY

## 2021-01-01 RX ADMIN — GADOTERATE MEGLUMINE 15 ML: 376.9 INJECTION INTRAVENOUS at 19:01

## 2021-01-01 RX ADMIN — SODIUM CHLORIDE 25 ML/HR: 900 INJECTION, SOLUTION INTRAVENOUS at 10:15

## 2021-01-01 RX ADMIN — DIPHENHYDRAMINE HYDROCHLORIDE 25 MG: 25 CAPSULE ORAL at 08:17

## 2021-01-01 RX ADMIN — LURBINECTEDIN 4.8 MG: 0.5 INJECTION, POWDER, LYOPHILIZED, FOR SOLUTION INTRAVENOUS at 15:34

## 2021-01-01 RX ADMIN — PALONOSETRON 0.25 MG: 0.05 INJECTION, SOLUTION INTRAVENOUS at 10:03

## 2021-01-01 RX ADMIN — PALONOSETRON HYDROCHLORIDE 0.25 MG: 0.25 INJECTION, SOLUTION INTRAVENOUS at 09:29

## 2021-01-01 RX ADMIN — Medication 10 ML: at 08:05

## 2021-01-01 RX ADMIN — SODIUM CHLORIDE 10 ML: 9 INJECTION INTRAMUSCULAR; INTRAVENOUS; SUBCUTANEOUS at 09:51

## 2021-01-01 RX ADMIN — IOHEXOL 50 ML: 240 INJECTION, SOLUTION INTRATHECAL; INTRAVASCULAR; INTRAVENOUS; ORAL at 10:14

## 2021-01-01 RX ADMIN — Medication 10 ML: at 08:16

## 2021-01-01 RX ADMIN — ZOLEDRONIC ACID 3.5 MG: 4 INJECTION, SOLUTION, CONCENTRATE INTRAVENOUS at 10:15

## 2021-01-01 RX ADMIN — SODIUM CHLORIDE 10 ML: 9 INJECTION INTRAMUSCULAR; INTRAVENOUS; SUBCUTANEOUS at 08:05

## 2021-01-01 RX ADMIN — ACETAMINOPHEN 650 MG: 325 TABLET ORAL at 08:17

## 2021-01-01 RX ADMIN — ZOLEDRONIC ACID 3.5 MG: 4 INJECTION, SOLUTION, CONCENTRATE INTRAVENOUS at 16:42

## 2021-01-01 RX ADMIN — Medication 10 ML: at 15:00

## 2021-01-01 RX ADMIN — PALONOSETRON 0.25 MG: 0.05 INJECTION, SOLUTION INTRAVENOUS at 15:16

## 2021-01-01 RX ADMIN — SODIUM CHLORIDE 10 ML: 9 INJECTION INTRAMUSCULAR; INTRAVENOUS; SUBCUTANEOUS at 10:40

## 2021-01-01 RX ADMIN — IOPAMIDOL 100 ML: 755 INJECTION, SOLUTION INTRAVENOUS at 10:07

## 2021-01-01 RX ADMIN — ZOLEDRONIC ACID 3.5 MG: 4 INJECTION, SOLUTION, CONCENTRATE INTRAVENOUS at 11:15

## 2021-01-01 RX ADMIN — DEXAMETHASONE SODIUM PHOSPHATE 8 MG: 4 INJECTION, SOLUTION INTRAMUSCULAR; INTRAVENOUS at 10:06

## 2021-01-01 RX ADMIN — IOPAMIDOL 100 ML: 755 INJECTION, SOLUTION INTRAVENOUS at 09:47

## 2021-01-01 RX ADMIN — Medication 10 ML: at 08:00

## 2021-01-01 RX ADMIN — SODIUM CHLORIDE 10 ML: 9 INJECTION INTRAMUSCULAR; INTRAVENOUS; SUBCUTANEOUS at 12:15

## 2021-01-01 RX ADMIN — DEXAMETHASONE SODIUM PHOSPHATE 8 MG: 4 INJECTION, SOLUTION INTRAMUSCULAR; INTRAVENOUS at 09:25

## 2021-01-01 RX ADMIN — HEPARIN 500 UNITS: 100 SYRINGE at 11:42

## 2021-01-01 RX ADMIN — SODIUM CHLORIDE 25 ML/HR: 9 INJECTION, SOLUTION INTRAVENOUS at 09:17

## 2021-01-01 RX ADMIN — HEPARIN 500 UNITS: 100 SYRINGE at 11:47

## 2021-01-01 RX ADMIN — SODIUM CHLORIDE 10 ML: 9 INJECTION, SOLUTION INTRAMUSCULAR; INTRAVENOUS; SUBCUTANEOUS at 15:00

## 2021-01-01 RX ADMIN — Medication 500 UNITS: at 11:28

## 2021-01-01 RX ADMIN — GADOTERIDOL 9 ML: 279.3 INJECTION, SOLUTION INTRAVENOUS at 10:44

## 2021-01-01 RX ADMIN — SODIUM CHLORIDE 25 ML/HR: 900 INJECTION, SOLUTION INTRAVENOUS at 10:10

## 2021-01-01 RX ADMIN — ZOLEDRONIC ACID 3.5 MG: 4 INJECTION, SOLUTION, CONCENTRATE INTRAVENOUS at 10:29

## 2021-01-01 RX ADMIN — Medication 10 ML: at 17:05

## 2021-01-01 RX ADMIN — SODIUM CHLORIDE 10 ML: 9 INJECTION INTRAMUSCULAR; INTRAVENOUS; SUBCUTANEOUS at 08:00

## 2021-01-01 RX ADMIN — Medication 10 ML: at 11:28

## 2021-01-01 RX ADMIN — Medication 500 UNITS: at 12:15

## 2021-01-01 RX ADMIN — IOHEXOL 50 ML: 240 INJECTION, SOLUTION INTRATHECAL; INTRAVASCULAR; INTRAVENOUS; ORAL at 09:47

## 2021-01-01 RX ADMIN — DURVALUMAB 1500 MG: 500 INJECTION, SOLUTION INTRAVENOUS at 11:05

## 2021-01-01 RX ADMIN — Medication 10 ML: at 11:46

## 2021-01-01 RX ADMIN — Medication 500 UNITS: at 09:51

## 2021-01-01 RX ADMIN — SODIUM CHLORIDE 10 ML: 9 INJECTION INTRAMUSCULAR; INTRAVENOUS; SUBCUTANEOUS at 08:15

## 2021-01-01 RX ADMIN — LURBINECTEDIN 4.8 MG: 0.5 INJECTION, POWDER, LYOPHILIZED, FOR SOLUTION INTRAVENOUS at 10:10

## 2021-01-01 RX ADMIN — DEXAMETHASONE SODIUM PHOSPHATE 8 MG: 4 INJECTION, SOLUTION INTRA-ARTICULAR; INTRALESIONAL; INTRAMUSCULAR; INTRAVENOUS; SOFT TISSUE at 15:19

## 2021-01-01 RX ADMIN — Medication 10 ML: at 09:48

## 2021-01-01 RX ADMIN — Medication 10 ML: at 08:09

## 2021-01-01 RX ADMIN — Medication 500 UNITS: at 10:40

## 2021-01-01 RX ADMIN — LURBINECTEDIN 4.8 MG: 0.5 INJECTION, POWDER, LYOPHILIZED, FOR SOLUTION INTRAVENOUS at 10:00

## 2021-01-01 RX ADMIN — SODIUM CHLORIDE 250 ML: 900 INJECTION, SOLUTION INTRAVENOUS at 08:16

## 2021-01-01 RX ADMIN — HEPARIN 300 UNITS: 100 SYRINGE at 10:51

## 2021-01-01 RX ADMIN — DEXAMETHASONE SODIUM PHOSPHATE 8 MG: 4 INJECTION, SOLUTION INTRAMUSCULAR; INTRAVENOUS at 09:48

## 2021-01-01 RX ADMIN — SODIUM CHLORIDE 25 ML/HR: 900 INJECTION, SOLUTION INTRAVENOUS at 15:16

## 2021-01-01 RX ADMIN — HEPARIN 500 UNITS: 100 SYRINGE at 17:05

## 2021-01-01 RX ADMIN — LURBINECTEDIN 4.8 MG: 0.5 INJECTION, POWDER, LYOPHILIZED, FOR SOLUTION INTRAVENOUS at 10:43

## 2021-01-01 RX ADMIN — SODIUM CHLORIDE 25 ML/HR: 900 INJECTION, SOLUTION INTRAVENOUS at 09:29

## 2021-01-01 RX ADMIN — SODIUM CHLORIDE 25 ML/HR: 900 INJECTION, SOLUTION INTRAVENOUS at 09:50

## 2021-01-01 RX ADMIN — ZOLEDRONIC ACID 4 MG: 0.04 INJECTION, SOLUTION INTRAVENOUS at 09:32

## 2021-01-01 RX ADMIN — PALONOSETRON 0.25 MG: 0.05 INJECTION, SOLUTION INTRAVENOUS at 09:24

## 2021-01-01 RX ADMIN — ZOLEDRONIC ACID 4 MG: 0.04 INJECTION, SOLUTION INTRAVENOUS at 10:11

## 2021-01-25 NOTE — PROGRESS NOTES
Mnauel Downs is a 65 y.o. female here for follow up for met lung cancer. Treatment today:  Cycle 10 Day 1 Durvalumab Receiving Zometa today. 1. Have you been to the ER, urgent care clinic since your last visit? Hospitalized since your last visit? no 
 
2. Have you seen or consulted any other health care providers outside of the [address] [place] [address] since your last visit? Include any pap smears or colon screening. no 
 
Pt states she has been doing well. No complaints.

## 2021-01-27 NOTE — PROGRESS NOTES
8000 [facility] Visit Note 
 
0800 Pt arrived at [facility] ambulatory and in no distress for C10D1. Assessment completed, no new complaints voiced. Port accessed per protocol with positive blood return. Line flushed and capped. Pt to MD office for scheduled appointment. Patient Vitals for the past 12 hrs: 
 Temp Pulse Resp BP SpO2  
01/27/21 1208  72  (!) 150/83   
01/27/21 0804 97.6 °F (36.4 °C) 71 16  97 % Recent Results (from the past 12 hour(s)) METABOLIC PANEL, COMPREHENSIVE Collection Time: 01/27/21  8:11 AM  
Result Value Ref Range Sodium 127 (L) 136 - 145 mmol/L Potassium 4.0 3.5 - 5.1 mmol/L Chloride 95 (L) 97 - 108 mmol/L  
 CO2 27 21 - 32 mmol/L Anion gap 5 5 - 15 mmol/L Glucose 84 65 - 100 mg/dL BUN 15 6 - 20 MG/DL Creatinine 0.65 0.55 - 1.02 MG/DL  
 BUN/Creatinine ratio 23 (H) 12 - 20 GFR est AA >60 >60 ml/min/1.73m2 GFR est non-AA >60 >60 ml/min/1.73m2 Calcium 8.9 8.5 - 10.1 MG/DL Bilirubin, total 0.6 0.2 - 1.0 MG/DL  
 ALT (SGPT) 20 12 - 78 U/L  
 AST (SGOT) 23 15 - 37 U/L Alk. phosphatase 77 45 - 117 U/L Protein, total 6.8 6.4 - 8.2 g/dL Albumin 3.5 3.5 - 5.0 g/dL Globulin 3.3 2.0 - 4.0 g/dL A-G Ratio 1.1 1.1 - 2.2 TSH 3RD GENERATION Collection Time: 01/27/21  8:11 AM  
Result Value Ref Range TSH 1.52 0.36 - 3.74 uIU/mL CBC WITH AUTOMATED DIFF Collection Time: 01/27/21  9:32 AM  
Result Value Ref Range WBC 5.5 3.6 - 11.0 K/uL  
 RBC 4.22 3.80 - 5.20 M/uL  
 HGB 12.8 11.5 - 16.0 g/dL HCT 38.1 35.0 - 47.0 % MCV 90.3 80.0 - 99.0 FL  
 MCH 30.3 26.0 - 34.0 PG  
 MCHC 33.6 30.0 - 36.5 g/dL  
 RDW 12.3 11.5 - 14.5 % PLATELET 098 (L) 477 - 400 K/uL MPV 10.1 8.9 - 12.9 FL  
 NRBC 0.0 0  WBC ABSOLUTE NRBC 0.00 0.00 - 0.01 K/uL NEUTROPHILS 72 32 - 75 % LYMPHOCYTES 14 12 - 49 % MONOCYTES 11 5 - 13 % EOSINOPHILS 1 0 - 7 % BASOPHILS 1 0 - 1 % IMMATURE GRANULOCYTES 1 (H) 0.0 - 0.5 % ABS. NEUTROPHILS 3.8 1.8 - 8.0 K/UL  
 ABS. LYMPHOCYTES 0.8 0.8 - 3.5 K/UL  
 ABS. MONOCYTES 0.6 0.0 - 1.0 K/UL  
 ABS. EOSINOPHILS 0.1 0.0 - 0.4 K/UL  
 ABS. BASOPHILS 0.1 0.0 - 0.1 K/UL  
 ABS. IMM. GRANS. 0.1 (H) 0.00 - 0.04 K/UL  
 DF SMEAR SCANNED    
 RBC COMMENTS NORMOCYTIC, NORMOCHROMIC Medications received: 
Medications Administered 0.9% sodium chloride infusion Admin Date 
01/27/2021 Action New Bag Dose 25 mL/hr Rate 25 mL/hr Route IntraVENous Administered By 
Flower King RN  
  
  
 0.9% sodium chloride injection 10 mL Admin Date 
01/27/2021 Action Given Dose 
10 mL Route IntraVENous Administered By 
Flower King RN  
  
  
 durvalumab (IMFINZI) 1,500 mg in 0.9% sodium chloride 100 mL, overfill volume 10 mL IVPB Admin Date 
01/27/2021 Action New Bag Dose 
1,500 mg Rate 140 mL/hr Route IntraVENous Administered By 
Flower King RN  
  
  
 heparin (porcine) pf 300-500 Units Admin Date 
01/27/2021 Action Given Dose 
500 Units Route InterCATHeter Administered By 
Flower King RN  
  
  
 zoledronic acid (ZOMETA) 4 mg/100mL infusion Admin Date 
01/27/2021 Action Given Dose 4 mg Rate 400 mL/hr Route IntraVENous Administered By 
Flower King, PARUL  
  
  
  
 
 
1210 Tolerated treatment well, no adverse reaction noted. D/Cd from [facility] ambulatory and in no distress accompanied by self. Next appt 2/24

## 2021-01-27 NOTE — PROGRESS NOTES
Problem: Chemotherapy Treatment Goal: *Chemotherapy regimen followed Outcome: Progressing Towards Goal 
Goal: *Hemodynamically stable Outcome: Progressing Towards Goal 
Goal: *Tolerating diet Outcome: Progressing Towards Goal 
  
Problem: Infection - Risk of, Central Venous Catheter-Associated Bloodstream Infection Goal: *Absence of infection signs and symptoms Outcome: Progressing Towards Goal 
  
Problem: Patient Education:  Go to Education Activity Goal: Patient/Family Education Outcome: Progressing Towards Goal

## 2021-01-27 NOTE — PROGRESS NOTES
2001 [facility] 
at [place] [address], [facility], [address] 
[phone] Oncology Follow Up Note Patient: Delfin Alfaro MRN: [id number]  SSN: [id number] YOB: 1950  Age: 65 y.o. Sex: female Diagnosis 1. 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 Palliative chemotherapy Carboplatin/etoposide/durvalumab - s/p 6 cycles Durvalumab monotherapy - Cycle 4 Day 1 Subjective:  
  
Delfin Alfaro is a 65 y.o. 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 She has been experiencing mild dyspnea for over 2 years. Occasional cough, no phlegm or hemoptysis. She has some pain and discomfort in the left chest wall and RUQ. The pain is not bad and she does not take medicines. She is also having insomnia. She works for [email or web address] [address] [place] [address]. She was seen in the ED. CT showed a mass in the LLL invading into the mediastinum and hepatic metastasis. She denies headache or bone pains. CT guided bx established the diagnosis. Bone scan shows widespread bony metastasis. Ms. Danilo Carpio is receiving palliative chemotherapy. She feels well today with no new complaints. Review of Systems: 
 
Constitutional: negative Eyes: negative Ears, Nose, Mouth, Throat, and Face: negative Respiratory: negative Cardiovascular: negative Gastrointestinal: negative Genitourinary:negative Integument/Breast: negative Hematologic/Lymphatic: negative Musculoskeletal: negative Neurological: negative Past Medical History:  
Diagnosis Date  CAD (coronary artery disease)  Cancer ([facility] [address] lung Past Surgical History:  
Procedure Laterality Date  HX HEART CATHETERIZATION    
 HX HYSTERECTOMY  IR BX TRANSCATHETER  6/22/2020  IR INSERT TUNL CVC W PORT OVER 5 YEARS  2020 Family History Problem Relation Age of Onset  Heart Disease Father  Cancer Sister Social History Tobacco Use  Smoking status: Former Smoker Quit date: 2020 Years since quittin.6  Smokeless tobacco: Never Used Substance Use Topics  Alcohol use: Never Frequency: Never Prior to Admission medications Medication Sig Start Date End Date Taking? Authorizing Provider  
atorvastatin (LIPITOR) 10 mg tablet Take  by mouth daily. Yes Provider, Historical  
lidocaine-prilocaine (EMLA) topical cream Apply  to affected area as needed for Pain. 20  Yes Jose Mercado MD  
prochlorperazine (COMPAZINE) 10 mg tablet Take 0.5 Tabs by mouth every six (6) hours as needed for Nausea. 20  Yes Jose Mercado MD  
budesonide/formoterol fumarate (SYMBICORT IN) Take  by inhalation. Yes Provider, Historical  
clopidogrel (PLAVIX) 75 mg tab Take 1 Tab by mouth daily. 19  Yes Emilia Wong MD  
aspirin delayed-release 81 mg tablet Take 81 mg by mouth daily. Yes Provider, Historical  
cholecalciferol (VITAMIN D3) 1,000 unit cap Take  by mouth daily. Yes Provider, Historical  
calcium carbonate (TUMS) 200 mg calcium (500 mg) chew Take 1 Tab by mouth daily. Yes Provider, Historical  
  
 
 
Allergies Allergen Reactions  Codeine Itching Objective:  
 
Visit Vitals BP (!) 163/74 (BP 1 Location: Left arm, BP Patient Position: Sitting) Pulse 71 Temp 97.6 °F (36.4 °C) Resp 16 Ht 5' 3\" (1.6 m) Wt 115 lb (52.2 kg) SpO2 97% BMI 20.37 kg/m² Pain Scale: 0 - No pain/10 Physical Exam: 
 
GENERAL: alert, cooperative, no distress, appears stated age EYE: conjunctivae/corneas clear LYMPHATIC: Cervical, supraclavicular, and axillary nodes normal.  
THROAT & NECK: normal and no erythema or exudates noted. LUNG: clear to auscultation bilaterally HEART: regular rate and rhythm ABDOMEN: soft, non-tender. EXTREMITIES:  extremities normal, atraumatic, no cyanosis or edema SKIN: alopecia - improving NEUROLOGIC: AOx3. Gait normal.  
 
Physical exam was pulled in from a previous visit. No changes were made d/t physical exam remains the same. CT Results (most recent): 
[facility] on 11/12/20 CT ABD PELV W CONT Narrative INDICATION: Restaging disease  small cell carcinoma COMPARISON: August 21, 2020 TECHNIQUE:  Following the uneventful intravenous administration of 100 cc Isovue-300, 5 mm axial images were obtained through the chest, abdomen and 
pelvis. Coronal and sagittal reformats were generated. CT dose reduction was 
achieved through use of a standardized protocol tailored for this examination 
and automatic exposure control for dose modulation. Oral contrast was given FINDINGS: 
 
CHEST WALL: No mass or axillary lymphadenopathy. Infusion catheter in place. THYROID: Small nodules are stable. MEDIASTINUM: No mass or lymphadenopathy. PAULINA: No mass or lymphadenopathy. THORACIC AORTA: No dissection or aneurysm. MAIN PULMONARY ARTERY: Normal in caliber. TRACHEA/BRONCHI: Patent. ESOPHAGUS: No wall thickening or dilatation. HEART: Normal in size. PLEURA: No effusion or pneumothorax. LUNGS: Severe emphysema. Left infrahilar lesion is unchanged. 2 small right 
lower lobe pleural nodules are stable. Liver disease show decrease in number and size of multiple lesions. Gallbladder, adrenals appear unremarkable. There is no adenopathy in the abdomen or pelvis. Fecal stasis. There is no bowel wall thickening or obstruction no free air or 
free fluid. Prior hysterectomy. Kidneys appeared unobstructed. Review of bone windows show progression of disease in the pelvic bones. Impression IMPRESSION: 
1. Stable lesions in the chest. 
2. Decreased number and size of liver lesion since the prior examination. 3. Progression of bone disease. Lab Results Component Value Date/Time WBC 3.7 12/30/2020 08:14 AM  
 HGB 12.5 12/30/2020 08:14 AM  
 HCT 38.2 12/30/2020 08:14 AM  
 PLATELET 738 (L) 63/34/6586 08:14 AM  
 MCV 95.3 12/30/2020 08:14 AM  
 
 
Lab Results Component Value Date/Time Sodium 127 (L) 01/27/2021 08:11 AM  
 Potassium 4.0 01/27/2021 08:11 AM  
 Chloride 95 (L) 01/27/2021 08:11 AM  
 CO2 27 01/27/2021 08:11 AM  
 Anion gap 5 01/27/2021 08:11 AM  
 Glucose 84 01/27/2021 08:11 AM  
 BUN 15 01/27/2021 08:11 AM  
 Creatinine 0.65 01/27/2021 08:11 AM  
 BUN/Creatinine ratio 23 (H) 01/27/2021 08:11 AM  
 GFR est AA >60 01/27/2021 08:11 AM  
 GFR est non-AA >60 01/27/2021 08:11 AM  
 Calcium 8.9 01/27/2021 08:11 AM  
 Bilirubin, total 0.6 01/27/2021 08:11 AM  
 Alk. phosphatase 77 01/27/2021 08:11 AM  
 Protein, total 6.8 01/27/2021 08:11 AM  
 Albumin 3.5 01/27/2021 08:11 AM  
 Globulin 3.3 01/27/2021 08:11 AM  
 A-G Ratio 1.1 01/27/2021 08:11 AM  
 ALT (SGPT) 20 01/27/2021 08:11 AM  
 
 
 
Assessment: 1. 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 A detailed system by system evaluation of side effect was performed to assess chemotherapy related toxicity. Blood counts are acceptable. Results reviewed with the patient CT Chest Abd Pelv (11/12/2020) Good response to therapy. NM Bone scan (11/12/2020) Activity in bone d/t bone targeting agent 2. Bone metastasis Receiving zoledronic acid 3. Pedal edema - bilateral 
 
No pain or redness. Will decrease after elevation. 1+ 
Recommended compression stockings 4. Chemotherapy induced anemia - resolved Observation 5. Severe protein calorie malnutrition - improved Dietician consultation Protein supplements 6. Thrombocytopenia Observation Plan:  
 
 
> Continue with durvalumab monotherapy 
> Continue zometa every 4 weeks 
> CT Chest Abd Pelv, NM bone scan 
> Follow-up in 4 weeks I performed a history and physical examination of the patient and discussed his management with the NPP. I reviewed the NPP note and agree with the documented findings and plan of care. The patient was seen in conjunction with Ms. Prudencio Osborn. Signed by: Yusuf Espana MD 
                   January 27, 2021 
 
 
CC. Nathanael Giraldo MD 
CC.  Robel Roberts MD

## 2021-01-27 NOTE — LETTER
1/27/2021 Patient: Denise Pelaez YOB: 1950 Date of Visit: 1/27/2021 [address] [id number] Via Fax: [phone] Dear Yun De Jesus MD, Thank you for referring Ms. Elisa Cortez to [address] [place] [address] for evaluation. My notes for this consultation are attached. If you have questions, please do not hesitate to call me. I look forward to following your patient along with you.  
 
 
Sincerely, 
 
Tim Umaña NP

## 2021-02-22 NOTE — PROGRESS NOTES
Lydia Madrid is a 65 y.o. female here for follow up for met lung cancer. Treatment today:  Cycle 11 Day 1 Durvalumab Here to discuss imaging done 2/17/21. 1. Have you been to the ER, urgent care clinic since your last visit? Hospitalized since your last visit? no 
 
2. Have you seen or consulted any other health care providers outside of the [address] [place] [address] since your last visit? Include any pap smears or colon screening. no 
 
Pt states she has been doing ok. No complaints.

## 2021-02-23 NOTE — PROGRESS NOTES
[address] [place] [address] [address] [facility], [address] 
[phone] Oncology Follow Up Note Patient: Johnna Campbell MRN: [id number]  SSN: [id number] YOB: 1950  Age: 65 y.o. Sex: female Diagnosis 1. 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 Palliative chemotherapy Carboplatin/etoposide/durvalumab - s/p 6 cycles Durvalumab monotherapy - s/p 4 cycles - last cycle 1/27/2021 Subjective:  
  
Johnna Campbell is a 65 y.o. 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 She has been experiencing mild dyspnea for over 2 years. Occasional cough, no phlegm or hemoptysis. She has some pain and discomfort in the left chest wall and RUQ. The pain is not bad and she does not take medicines. She is also having insomnia. She works for [employer] [address] [place] [address]. She was seen in the ED. CT showed a mass in the LLL invading into the mediastinum and hepatic metastasis. She denies headache or bone pains. CT guided bx established the diagnosis. Bone scan shows widespread bony metastasis. Ms. Harmeet Vaughn is receiving palliative chemotherapy. She is here today to discuss her recent scans. She is complaining of fatigue and discomfort in the abdomen. Review of Systems: 
 
Constitutional: fatigue Eyes: negative Ears, Nose, Mouth, Throat, and Face: negative Respiratory: negative Cardiovascular: negative Gastrointestinal: discomfort in around her abdomen Genitourinary:negative Integument/Breast: negative Hematologic/Lymphatic: negative Musculoskeletal: negative Neurological: negative Past Medical History:  
Diagnosis Date  CAD (coronary artery disease)  Cancer ([facility] [address] lung Past Surgical History: Procedure Laterality Date  HX HEART CATHETERIZATION    
 HX HYSTERECTOMY  IR BX TRANSCATHETER  2020  IR INSERT TUNL CVC W PORT OVER 5 YEARS  2020 Family History Problem Relation Age of Onset  Heart Disease Father  Cancer Sister Social History Tobacco Use  Smoking status: Former Smoker Quit date: 2020 Years since quittin.7  Smokeless tobacco: Never Used Substance Use Topics  Alcohol use: Never Frequency: Never Prior to Admission medications Medication Sig Start Date End Date Taking? Authorizing Provider  
atorvastatin (LIPITOR) 10 mg tablet Take  by mouth daily. Yes Provider, Historical  
lidocaine-prilocaine (EMLA) topical cream Apply  to affected area as needed for Pain. 20  Yes Desi Acuna MD  
prochlorperazine (COMPAZINE) 10 mg tablet Take 0.5 Tabs by mouth every six (6) hours as needed for Nausea. 20  Yes Desi Acuna MD  
budesonide/formoterol fumarate (SYMBICORT IN) Take  by inhalation. Yes Provider, Historical  
clopidogrel (PLAVIX) 75 mg tab Take 1 Tab by mouth daily. 19  Yes Mayank Albert MD  
aspirin delayed-release 81 mg tablet Take 81 mg by mouth daily. Yes Provider, Historical  
cholecalciferol (VITAMIN D3) 1,000 unit cap Take  by mouth daily. Yes Provider, Historical  
calcium carbonate (TUMS) 200 mg calcium (500 mg) chew Take 1 Tab by mouth daily. Yes Provider, Historical  
  
 
 
Allergies Allergen Reactions  Codeine Itching Objective:  
 
Visit Vitals BP (!) 161/77 Pulse 76 Temp 97.9 °F (36.6 °C) Resp 18 Ht 5' 3\" (1.6 m) Wt 111 lb (50.3 kg) SpO2 100% BMI 19.66 kg/m² Pain Scale: 0 - No pain/10 Physical Exam: 
 
GENERAL: alert, cooperative, no distress, appears stated age EYE: conjunctivae/corneas clear LYMPHATIC: Cervical, supraclavicular, and axillary nodes normal.  
THROAT & NECK: normal and no erythema or exudates noted. LUNG: clear to auscultation bilaterally HEART: regular rate and rhythm ABDOMEN: soft, non-tender. EXTREMITIES:  extremities normal, atraumatic, no cyanosis or edema SKIN: alopecia - improving NEUROLOGIC: AOx3. Gait normal.  
 
Physical exam was pulled in from a previous visit. No changes were made d/t physical exam remains the same. CT Results (most recent): 
Results from [facility] Encounter encounter on 02/17/21 CT ABD PELV W CONT Narrative INDICATION: Small cell carcinoma of left lower lung, metastatic to liver. Restaging disease COMPARISON: 11/12/2020 TECHNIQUE:  Following the uneventful intravenous administration of 100 cc Isovue-300, 5 mm axial images were obtained through the chest, abdomen, and 
pelvis. Oral contrast was present. Coronal and sagittal reformats were 
generated. CT dose reduction was achieved through use of a standardized 
protocol tailored for this examination and automatic exposure control for dose 
modulation. FINDINGS: 
 
CHEST WALL: No mass or axillary lymphadenopathy. THYROID: No nodule. MEDIASTINUM: AP window nodes measure up to 1.5 x 1.2 cm and 1.4 x 1.1 cm. The 
subcarinal node measures 0.9 x 0.3 cm. PAULINA: The left hilar node measures 1.6 x 2.4 cm and 1.4 x 1.8 cm. THORACIC AORTA: No dissection or aneurysm. MAIN PULMONARY ARTERY: Normal in caliber. TRACHEA/BRONCHI: Patent. ESOPHAGUS: No wall thickening or dilatation. HEART: Coronary artery calcification. PLEURA: No effusion or pneumothorax. LUNGS: New left lower lobe bilobed mass or coalescence of masses that measure 4.9 x 2.3 cm. Additional multiple microvascular in the left lower lobe. Underlying emphysematous change. LIVER: No biliary dilatation. Innumerable hepatic metastases, the largest of 
which has increased from 5 mm to 2.4 cm. There are at least 10 lesions that are 
larger than a centimeter on the current study. A few new lesions are also 
identified. GALLBLADDER: Unremarkable. SPLEEN: No mass. 
PANCREAS: No mass or ductal dilatation. 
ADRENALS: Unremarkable. 
KIDNEYS: No mass, calculus, or hydronephrosis. Low position of the left kidney 
STOMACH: Unremarkable. 
SMALL BOWEL: No dilatation or wall thickening. 
COLON: No dilatation or wall thickening. 
APPENDIX: Unremarkable. Axial image 94 
PERITONEUM: No ascites or pneumoperitoneum. 
RETROPERITONEUM: No lymphadenopathy or aortic aneurysm. 
REPRODUCTIVE ORGANS: Prior hysterectomy 
URINARY BLADDER: No mass or calculus. 
BONES: Innumerable osseous disease. These appear denser than on the prior study 
but have not definitely changed in number or size is. 
ADDITIONAL COMMENTS: N/A 
  
 Impression Progression of disease with increase in number and sizes of hepatic, pulmonary, 
and mediastinal/hilar metastases 
Increased density of the multiple osseous metastases.  
 
 
 
Lab Results  
Component Value Date/Time  
 WBC 5.5 02/24/2021 08:25 AM  
 HGB 13.3 02/24/2021 08:25 AM  
 HCT 39.2 02/24/2021 08:25 AM  
 PLATELET 150 02/24/2021 08:25 AM  
 MCV 87.9 02/24/2021 08:25 AM  
 
 
Lab Results  
Component Value Date/Time  
 Sodium 129 (L) 02/24/2021 08:25 AM  
 Potassium 3.8 02/24/2021 08:25 AM  
 Chloride 95 (L) 02/24/2021 08:25 AM  
 CO2 28 02/24/2021 08:25 AM  
 Anion gap 6 02/24/2021 08:25 AM  
 Glucose 92 02/24/2021 08:25 AM  
 BUN 17 02/24/2021 08:25 AM  
 Creatinine 0.61 02/24/2021 08:25 AM  
 BUN/Creatinine ratio 28 (H) 02/24/2021 08:25 AM  
 GFR est AA >60 02/24/2021 08:25 AM  
 GFR est non-AA >60 02/24/2021 08:25 AM  
 Calcium 8.9 02/24/2021 08:25 AM  
 Bilirubin, total 1.0 02/24/2021 08:25 AM  
 Alk. phosphatase 88 02/24/2021 08:25 AM  
 Protein, total 6.9 02/24/2021 08:25 AM  
 Albumin 3.3 (L) 02/24/2021 08:25 AM  
 Globulin 3.6 02/24/2021 08:25 AM  
 A-G Ratio 0.9 (L) 02/24/2021 08:25 AM  
 ALT (SGPT) 25 02/24/2021 08:25 AM  
 
 
 
Assessment:  
 
1. 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 
  T4 N2 M1b (Stage IV) 
 
ECOG PS 1 
Intent of Treatment - palliative 
Prognosis: poor 
 
Receiving palliative chemotherapy  
 Carboplatin/etoposide/durvalumab - s/p 6 cycles 
 Durvalumab monotherapy - s/p 4 Cycles 
 
A detailed system by system evaluation of side effect was performed to assess chemotherapy related toxicity.  
Blood counts are acceptable. Results reviewed with the patient 
 
CT shows progression of disease in the chest and abdomen.  
I recommended a repeat brain MRI. 
I also recommended switching therapy to Lubrinectidin.  
 
Lubrinectidin is approved for second line therapy in extensive stage SCLC. I educated her about the usual side effects such as nausea, vomiting, diarrhea and neutropenia/neutropenic sepsis. I will use G-CSF to reduce risk of neutropenic fever.  
 
She agrees to start the treatment.  
 
 
2. Bone metastasis 
 
Receiving zoledronic acid 
 
 
3. Pedal edema - bilateral 
 
No pain or redness. Will decrease after elevation. 
1+ 
Recommended compression stockings 
 
 
4. Chemotherapy induced anemia - resolved 
 
Observation 
 
 
5. Severe protein calorie malnutrition - improved 
 
Dietician consultation 
Protein supplements 
 
 
Plan:  
 
> Discontinue Duravalumab 
> Plan to start Lurbinectedin  
> Continue zometa every 4 weeks 
> MRI of the brain ordered 
> Follow-up at start of Lurbinectedin 
 
 
I performed a history and physical examination of the patient and discussed his management with the NPP. I reviewed the NPP note and agree with the documented findings and plan of care. The patient was seen in conjunction with Ms. Xie.  
 
Ms. Robertson is a women with extensive stage SCLC. The disease responded to chemo-immunotherapy but more recently has progressed in the lung and liver. She feels poorly. I recommended switching therapy to second line Lubrinectidin. I will also obtain an MRI of the brain.  
 
 
Signed by: Kaiden Storey MD 
                   February 24, 2021 
 
 
 CC. Claudio Meyer MD 
CC. Monica Ulloa MD

## 2021-02-24 NOTE — PROGRESS NOTES
Follow up Consultation    Nephrology   Clau Mancia 30 y o  male MRN: [id number]  Unit/Bed#: [facility] 628-01 Encounter: [id number]      Physician Requesting Consult: Saturnino Escudero MD        ASSESSMENT/PLAN:  55-year-old male was recently hospitalized with polytrauma including bilateral wrist injuries, status post OR for wrist injuries, lumbar spine fractures, subarachnoid hemorrhage, for cervical vertebra close nondisplaced fracture, now presented again with worsening headache  We are consulted for severe hyponatremia   Severe hypotonic hyponatremia:  - patient admitted with serum sodium of 119 on 09/05/2020   - 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 due to significant pain and recent subarachnoid hemorrhage   - workup revealed urine sodium 90, urine osmolality 396, serum osmolality 259    - will DC salt tablets for now since no clear improvement in serum sodium with salt tablets and Lasix in the last 24 hours  - continue 1 8% saline at 40 cc an hour  - check BMP q 8 call if sodium greater than 127 mEq on less than 118 mEq  - patient's creatinine today is at 0 91 mg/dL  - Avoid nephrotoxins, adjust meds to appropriate GFR   - Strict I/O   - Daily weights  - Urinary retention protocol     Blood pressure:  - current medications:  Cardizem 240 mg p o  Q day  - recommendations:  No changes  - Optimize hemodynamics   - Maintain MAP > 65mmHg  - Avoid BP fluctuations       H/H/anemia:  - most recent hemoglobin at 14 7 grams/deciliter  - maintain hemoglobin greater than 8 grams/deciliter     Acid-base electrolytes:  o Hyponatremia:    - See above  - Most recent sodium at 119 mEq      o  Acid-base:    - Most recent bicarb at 24     Volume status:  o  Euvolemic     Proteinuria:   o Most likely secondary to  o Most recent UA from 2019 with trace protein     Other medical problems:  o Diabetes:  Management per primary team   On insulin  o CHF:  Management per primary team      Thanks for the consult  [facility] VISIT NOTE 
 
0800 Pt arrived at [facility] ambulatory and in no distress for C11 of [place]. Assessment completed, no new complaints at this time. Pt denies all COVID symptoms, recent travel, and recent exposure. Port accessed with 1 in [facility] with no difficulty. Positive blood return noted and labs drawn. Recent Results (from the past 12 hour(s)) CBC WITH AUTOMATED DIFF Collection Time: 02/24/21  8:25 AM  
Result Value Ref Range WBC 5.5 3.6 - 11.0 K/uL  
 RBC 4.46 3.80 - 5.20 M/uL  
 HGB 13.3 11.5 - 16.0 g/dL HCT 39.2 35.0 - 47.0 % MCV 87.9 80.0 - 99.0 FL  
 MCH 29.8 26.0 - 34.0 PG  
 MCHC 33.9 30.0 - 36.5 g/dL  
 RDW 12.6 11.5 - 14.5 % PLATELET 919 451 - 021 K/uL MPV 8.8 (L) 8.9 - 12.9 FL  
 NRBC 0.0 0  WBC ABSOLUTE NRBC 0.00 0.00 - 0.01 K/uL NEUTROPHILS 79 (H) 32 - 75 % BAND NEUTROPHILS 1 % LYMPHOCYTES 8 (L) 12 - 49 % MONOCYTES 11 5 - 13 % EOSINOPHILS 1 0 - 7 % BASOPHILS 0 0 - 1 % IMMATURE GRANULOCYTES 0 0.0 - 0.5 % ABS. NEUTROPHILS 4.4 1.8 - 8.0 K/UL  
 ABS. LYMPHOCYTES 0.4 (L) 0.8 - 3.5 K/UL  
 ABS. MONOCYTES 0.6 0.0 - 1.0 K/UL  
 ABS. EOSINOPHILS 0.1 0.0 - 0.4 K/UL  
 ABS. BASOPHILS 0.0 0.0 - 0.1 K/UL  
 ABS. IMM. GRANS. 0.0 0.00 - 0.04 K/UL  
 DF MANUAL    
 RBC COMMENTS NORMOCYTIC, NORMOCHROMIC METABOLIC PANEL, COMPREHENSIVE Collection Time: 02/24/21  8:25 AM  
Result Value Ref Range Sodium 129 (L) 136 - 145 mmol/L Potassium 3.8 3.5 - 5.1 mmol/L Chloride 95 (L) 97 - 108 mmol/L  
 CO2 28 21 - 32 mmol/L Anion gap 6 5 - 15 mmol/L Glucose 92 65 - 100 mg/dL BUN 17 6 - 20 MG/DL Creatinine 0.61 0.55 - 1.02 MG/DL  
 BUN/Creatinine ratio 28 (H) 12 - 20 GFR est AA >60 >60 ml/min/1.73m2 GFR est non-AA >60 >60 ml/min/1.73m2 Calcium 8.9 8.5 - 10.1 MG/DL Bilirubin, total 1.0 0.2 - 1.0 MG/DL  
 ALT (SGPT) 25 12 - 78 U/L  
 AST (SGOT) 24 15 - 37 U/L Alk. phosphatase 88 45 - 117 U/L Protein, total 6.9 6.4 - 8.2 g/dL Albumin 3.3 (L) 3.5 - 5.0 g/dL Globulin 3.6 2.0 - 4.0 g/dL A-G Ratio 0.9 (L) 1.1 - 2.2 TSH 3RD GENERATION Collection Time: 02/24/21  8:25 AM  
Result Value Ref Range TSH 2.56 0.36 - 3.74 uIU/mL Treatment HELD today per Dr. Candice Montes. Blood pressure (!) 161/77, pulse 76, temperature 97.9 °F (36.6 °C), resp. rate 18, height 5' 3\" (1.6 m), weight 50.7 kg (111 lb 12.8 oz), SpO2 100 %. Port de-accessed and flushed per protocol. Janis Alex Dr D/C'd from [facility] ambulatory and in no distress. Next appointment is 3/5/21. continue to follow  Please call with questions/ concerns  Above-mentioned orders and Plan in terms of hyponatremia was discussed with the team in [address] Mena MD, FASN, 2020, 12:29 PM              Objective :   Patient seen and examined in his room no overnight events hemodynamically stable just started on 1 8% saline early this a m  Due to no improvement in serum sodium  Slightly lethargic  PHYSICAL EXAM  /91   Pulse 63   Temp 98 6 °F (37 °C)   Resp 18   SpO2 99%   Temp (24hrs), Av 8 °F (37 1 °C), Min:97 6 °F (36 4 °C), Max:99 5 °F (37 5 °C)        Intake/Output Summary (Last 24 hours) at 2020 1229  Last data filed at 2020 0646  Gross per 24 hour   Intake 1030 ml   Output 850 ml   Net 180 ml       I/O last 24 hours: In: 1330 [P O :898; I V :432]  Out: 850 [Urine:850]      Current Weight:    First Weight:    Physical Exam  Vitals signs reviewed  Constitutional:       General: He is not in acute distress  Appearance: He is not ill-appearing, toxic-appearing or diaphoretic  HENT:      Head: Normocephalic and atraumatic  Mouth/Throat:      Mouth: Mucous membranes are moist       Pharynx: Oropharynx is clear  Eyes:      General: No scleral icterus  Conjunctiva/sclera: Conjunctivae normal    Neck:      Musculoskeletal: Normal range of motion and neck supple  Cardiovascular:      Rate and Rhythm: Normal rate  Heart sounds: No friction rub  Pulmonary:      Effort: Pulmonary effort is normal  No respiratory distress  Breath sounds: Normal breath sounds  No wheezing  Abdominal:      General: Bowel sounds are normal  There is no distension  Palpations: There is no mass  Tenderness: There is no abdominal tenderness  Musculoskeletal:         General: No swelling  Comments: Bilateral upper extremity casts and wraps in place   Skin:     General: Skin is warm  Coloration: Skin is not jaundiced or pale     Neurological:      General: No focal deficit present  Mental Status: He is alert and oriented to person, place, and time  Psychiatric:         Mood and Affect: Mood normal          Behavior: Behavior normal              Review of Systems   Constitutional: Positive for fatigue  Negative for chills  HENT: Negative for congestion  Respiratory: Negative for cough, shortness of breath and wheezing  Cardiovascular: Negative for leg swelling  Gastrointestinal: Negative for abdominal pain, constipation, diarrhea, nausea and vomiting  Genitourinary: Negative for dysuria  Musculoskeletal: Negative for back pain  Skin: Positive for wound  Negative for rash  Neurological: Negative for dizziness and headaches  Psychiatric/Behavioral: Negative for agitation and confusion  All other systems reviewed and are negative        Scheduled Meds:  Current Facility-Administered Medications   Medication Dose Route Frequency Provider Last Rate    acetaminophen  975 mg Oral Q6H [facility] Floresita Pena, DO      aluminum-magnesium hydroxide-simethicone  30 mL Oral Q4H PRN David Bedoya PA-C      calcium carbonate  500 mg Oral TID PRN David Bedoya PA-C      carbamide peroxide  5 drop Right Ear BID Floresita Pena, DO      diltiazem  240 mg Oral Daily Floresita Pena, DO      docusate sodium  100 mg Oral BID Floresita Pena, DO      enoxaparin  30 mg Subcutaneous Q12H Floresita Pena, DO      HYDROmorphone  0 5 mg Intravenous Q3H PRN Floresita Pena, DO      levETIRAcetam  500 mg Oral Q12H [facility] Floresita Pena, DO      melatonin  6 mg Oral HS Floresita Pena, DO      methocarbamol  750 mg Oral Q6H PRN Olamide Corona, DO      oxyCODONE  10 mg Oral Q4H PRN Olamide Corona, DO      oxyCODONE  5 mg Oral Q4H PRN Floresita Pena, DO      pantoprazole  40 mg Oral Early Morning Jarrett Ayoub PA-C      polyethylene glycol  17 g Oral Daily PRN Olamide Corona, DO      senna  2 tablet Oral Daily Floresita Reynoso,       IV infusion builder   Intravenous Continuous Gisel Inman MD 40 mL/hr at 09/07/20 0646       PRN Meds: aluminum-magnesium hydroxide-simethicone    calcium carbonate    HYDROmorphone    methocarbamol    oxyCODONE    oxyCODONE    polyethylene glycol    Continuous Infusions:IV infusion builder, , Last Rate: 40 mL/hr at 09/07/20 0646          Invasive Devices: Invasive Devices     Peripheral Intravenous Line            Peripheral IV 09/05/20 Right Antecubital 2 days                  LABORATORY:    Results from last 7 days   Lab Units 09/07/20  0609 09/07/20  0106 09/06/20  1835 09/06/20  1302 09/06/20  1301 09/06/20  0551 09/05/20  2143 09/05/20  1326  09/05/20  0654   WBC Thousand/uL 5 92  --   --  5 50  --   --   --   --   --  6 53   HEMOGLOBIN g/dL 14 7  --   --  14 7  --   --   --   --   --  14 7   HEMATOCRIT % 39 4  --   --  39 5  --   --   --   --   --  39 3   PLATELETS Thousands/uL 288  --   --  292  --   --   --   --   --  292   POTASSIUM mmol/L 4 2 4 7 4 6  --  4 5 4 0 4 1 4 8   < > 4 2   CHLORIDE mmol/L 87* 86* 84*  --  87* 87* 88* 89*   < > 85*   CO2 mmol/L 24 25 24  --  22 24 26 23   < > 25   BUN mg/dL 15 15 15  --  15 13 13 13   < > 13   CREATININE mg/dL 0 91 1 01 0 97  --  0 89 0 88 0 88 0 89   < > 0 95   CALCIUM mg/dL 9 0 9 2 9 4  --  8 9 9 0 8 7 9 1   < > 9 1    < > = values in this interval not displayed  rest all reviewed    RADIOLOGY:  No orders to display     Rest all reviewed    Portions of the record may have been created with voice recognition software  Occasional wrong word or "sound a like" substitutions may have occurred due to the inherent limitations of voice recognition software  Read the chart carefully and recognize, using context, where substitutions have occurred  If you have any questions, please contact the dictating provider

## 2021-02-24 NOTE — LETTER
2/24/2021 Patient: Dawson Ramos YOB: 1950 Date of Visit: 2/24/2021 Katie Rhodes [phone] [phone] Via Fax: [phone] Dear Lulú Jimenez MD, Thank you for referring Ms. Adriana Chan to [address] [place] [address] for evaluation. My notes for this consultation are attached. If you have questions, please do not hesitate to call me. I look forward to following your patient along with you.  
 
 
Sincerely, 
 
Dez Norton NP

## 2021-02-26 NOTE — PROGRESS NOTES
Ar Barrios is a 65 y.o. female here for follow up for small cell lung cancer. Treatment today:  Cycle 1 Day 1 Lurbinectedin Receiving Zometa today. 1. Have you been to the ER, urgent care clinic since your last visit? Hospitalized since your last visit? no 
 
2. Have you seen or consulted any other health care providers outside of the [address] [place] [address] since your last visit? Include any pap smears or colon screening. no 
 
Pt states she has been well. No complaints.

## 2021-03-01 NOTE — PROGRESS NOTES
[facility] 
at [place] [address] [address] [facility] [facility], [address] 
[phone] Oncology Follow Up Note Patient: Katie Schilling MRN: [id number]  SSN: [id number] YOB: 1950  Age: 66 y.o. Sex: female Diagnosis 1. 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 Palliative chemotherapy Carboplatin/etoposide/durvalumab - s/p 6 cycles Durvalumab monotherapy - s/p 4 cycles - last cycle 1/27/2021 Lubrinectidin, cycle 1 day 1 Subjective:  
  
Katie Schilling is a 66 y.o. 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 She has been experiencing mild dyspnea for over 2 years. Occasional cough, no phlegm or hemoptysis. She has some pain and discomfort in the left chest wall and RUQ. The pain is not bad and she does not take medicines. She is also having insomnia. She works for [employer] [address] [place] [address]. She was seen in the ED. CT showed a mass in the LLL invading into the mediastinum and hepatic metastasis. She denies headache or bone pains. CT guided bx established the diagnosis. Bone scan shows widespread bony metastasis. Ms. Ottoniel Lloyd is receiving palliative chemotherapy. She is experiencing abdominal pain and fatigue. She feels poorly. Review of Systems: 
 
Constitutional: fatigue Eyes: negative Ears, Nose, Mouth, Throat, and Face: negative Respiratory: negative Cardiovascular: negative Gastrointestinal: discomfort in around her abdomen Genitourinary:negative Integument/Breast: negative Hematologic/Lymphatic: negative Musculoskeletal: negative Neurological: negative Past Medical History:  
Diagnosis Date  CAD (coronary artery disease)  Cancer ([facility] [address] lung Past Surgical History:  
Procedure Laterality Date  HX HEART CATHETERIZATION    
 HX HYSTERECTOMY  IR BX TRANSCATHETER  2020  IR INSERT TUNL CVC W PORT OVER 5 YEARS  2020 Family History Problem Relation Age of Onset  Heart Disease Father  Cancer Sister Social History Tobacco Use  Smoking status: Former Smoker Quit date: 2020 Years since quittin.7  Smokeless tobacco: Never Used Substance Use Topics  Alcohol use: Never Frequency: Never Prior to Admission medications Medication Sig Start Date End Date Taking? Authorizing Provider  
losartan (COZAAR) 25 mg tablet Take  by mouth daily. Yes Provider, Historical  
atorvastatin (LIPITOR) 10 mg tablet Take  by mouth daily. Yes Provider, Historical  
lidocaine-prilocaine (EMLA) topical cream Apply  to affected area as needed for Pain. 20  Yes Maine Lou MD  
prochlorperazine (COMPAZINE) 10 mg tablet Take 0.5 Tabs by mouth every six (6) hours as needed for Nausea. 20  Yes Maine Lou MD  
budesonide/formoterol fumarate (SYMBICORT IN) Take  by inhalation. Yes Provider, Historical  
clopidogrel (PLAVIX) 75 mg tab Take 1 Tab by mouth daily. 19  Yes Arcelia Sherman MD  
aspirin delayed-release 81 mg tablet Take 81 mg by mouth daily. Yes Provider, Historical  
cholecalciferol (VITAMIN D3) 1,000 unit cap Take  by mouth daily. Yes Provider, Historical  
calcium carbonate (TUMS) 200 mg calcium (500 mg) chew Take 1 Tab by mouth daily. Yes Provider, Historical  
  
 
 
Allergies Allergen Reactions  Codeine Itching Objective:  
 
Visit Vitals BP (!) 192/83 (BP 1 Location: Left upper arm, BP Patient Position: Sitting) Pulse 87 Temp 97.1 °F (36.2 °C) Resp 18 Ht 5' 3\" (1.6 m) Wt 111 lb (50.3 kg) SpO2 95% BMI 19.66 kg/m² Pain Scale: 0 - No pain/10 Physical Exam: 
 
GENERAL: alert, cooperative, no distress, appears stated age EYE: conjunctivae/corneas clear LYMPHATIC: Cervical, supraclavicular, and axillary nodes normal.  
THROAT & NECK: normal and no erythema or exudates noted. LUNG: clear to auscultation bilaterally HEART: regular rate and rhythm ABDOMEN: soft, non-tender. EXTREMITIES:  extremities normal, atraumatic, no cyanosis or edema SKIN: alopecia - improving NEUROLOGIC: AOx3. Gait normal.  
 
Physical exam was pulled in from a previous visit. No changes were made d/t physical exam remains the same. CT Results (most recent): 
Results from [facility] Encounter encounter on 02/17/21 CT ABD PELV W CONT Narrative INDICATION: Small cell carcinoma of left lower lung, metastatic to liver. Restaging disease COMPARISON: 11/12/2020 TECHNIQUE:  Following the uneventful intravenous administration of 100 cc Isovue-300, 5 mm axial images were obtained through the chest, abdomen, and 
pelvis. Oral contrast was present. Coronal and sagittal reformats were 
generated. CT dose reduction was achieved through use of a standardized 
protocol tailored for this examination and automatic exposure control for dose 
modulation. FINDINGS: 
 
CHEST WALL: No mass or axillary lymphadenopathy. THYROID: No nodule. MEDIASTINUM: AP window nodes measure up to 1.5 x 1.2 cm and 1.4 x 1.1 cm. The 
subcarinal node measures 0.9 x 0.3 cm. PAULINA: The left hilar node measures 1.6 x 2.4 cm and 1.4 x 1.8 cm. THORACIC AORTA: No dissection or aneurysm. MAIN PULMONARY ARTERY: Normal in caliber. TRACHEA/BRONCHI: Patent. ESOPHAGUS: No wall thickening or dilatation. HEART: Coronary artery calcification. PLEURA: No effusion or pneumothorax. LUNGS: New left lower lobe bilobed mass or coalescence of masses that measure 4.9 x 2.3 cm. Additional multiple microvascular in the left lower lobe. Underlying emphysematous change. LIVER: No biliary dilatation. Innumerable hepatic metastases, the largest of 
which has increased from 5 mm to 2.4 cm.  There are at least 10 lesions that are 
larger than a centimeter on the current study. A few new lesions are also 
identified. GALLBLADDER: Unremarkable. SPLEEN: No mass. PANCREAS: No mass or ductal dilatation. ADRENALS: Unremarkable. KIDNEYS: No mass, calculus, or hydronephrosis. Low position of the left kidney STOMACH: Unremarkable. SMALL BOWEL: No dilatation or wall thickening. COLON: No dilatation or wall thickening. APPENDIX: Unremarkable. Axial image 94 PERITONEUM: No ascites or pneumoperitoneum. RETROPERITONEUM: No lymphadenopathy or aortic aneurysm. REPRODUCTIVE ORGANS: Prior hysterectomy URINARY BLADDER: No mass or calculus. BONES: Innumerable osseous disease. These appear denser than on the prior study 
but have not definitely changed in number or size is. ADDITIONAL COMMENTS: N/A Impression Progression of disease with increase in number and sizes of hepatic, pulmonary, 
and mediastinal/hilar metastases Increased density of the multiple osseous metastases. I personally reviewed the images. Disease progression in the lung and liver. Lab Results Component Value Date/Time WBC 6.0 03/01/2021 02:11 PM  
 HGB 14.4 03/01/2021 02:11 PM  
 Hematocrit (POC) 43 03/01/2021 02:14 PM  
 HCT 40.5 03/01/2021 02:11 PM  
 PLATELET 579 (L) 07/89/5184 02:11 PM  
 MCV 86.7 03/01/2021 02:11 PM  
 
 
Lab Results Component Value Date/Time Sodium 124 (L) 03/01/2021 02:11 PM  
 Potassium 3.8 03/01/2021 02:11 PM  
 Chloride 92 (L) 03/01/2021 02:11 PM  
 CO2 26 03/01/2021 02:11 PM  
 Anion gap 6 03/01/2021 02:11 PM  
 Glucose 75 03/01/2021 02:11 PM  
 BUN 14 03/01/2021 02:11 PM  
 Creatinine 0.51 (L) 03/01/2021 02:11 PM  
 BUN/Creatinine ratio 27 (H) 03/01/2021 02:11 PM  
 GFR est AA >60 03/01/2021 02:11 PM  
 GFR est non-AA >60 03/01/2021 02:11 PM  
 Calcium 8.7 03/01/2021 02:11 PM  
 Bilirubin, total 0.8 03/01/2021 02:11 PM  
 Alk.  phosphatase 101 03/01/2021 02:11 PM  
 Protein, total 7.2 03/01/2021 02:11 PM  
 Albumin 3.6 03/01/2021 02:11 PM Globulin 3.6 03/01/2021 02:11 PM  
 A-G Ratio 1.0 (L) 03/01/2021 02:11 PM  
 ALT (SGPT) 32 03/01/2021 02:11 PM  
 
 
 
Assessment: 1. 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 She vocalized understanding. A detailed system by system evaluation of side effect was performed to assess chemotherapy related toxicity. Blood counts are acceptable. Results reviewed with the patient 2. Bone metastasis Receiving zoledronic acid 3. Pedal edema - bilateral 
 
No pain or redness. Will decrease after elevation. 1+ 
Recommended compression stockings 4. Chemotherapy induced anemia - resolved Observation 5. Severe protein calorie malnutrition - improved Dietician consultation Protein supplements 6. Hypocalcemia Start Calcium OTC daily 7. Thrombocytopenia Observation Plan:  
 
> Start Lurbinectedin today 
> Continue zometa every 4 weeks 
> MRI of the brain scheduled for tomorrow 
> Start Calcium daily 
> Follow-up every 3 weeks I performed a history and physical examination of the patient and discussed his management with the NPP. I reviewed the NPP note and agree with the documented findings and plan of care. The patient was seen in conjunction with Ms. Xie. Ms. Leonides Shahid is a women with extensive stage SCLC. The disease has progressed on Durvalumab. She will be starting Lubrinectidin as second line therapy. She is fatigued and is feeling poorly. Exam reveals an underweight women. Sumi Henriquez MD 
CC.  Gabbi Farmer MD

## 2021-03-01 NOTE — LETTER
3/14/2021 Patient: Lucinda Lee YOB: 1950 [facility]: [facility] [address] [facility] Fax: [phone] Dear Michael Erazo MD, Thank you for referring Ms. Bubba Lyle to [address] [place] [address] for evaluation. My notes for this consultation are attached. If you have questions, please do not hesitate to call me. I look forward to following your patient along with you.  
 
 
Sincerely, 
 
Harrison Dent NP

## 2021-03-01 NOTE — PROGRESS NOTES
Pt arrived to [facility] ambulatory in no acute distress at 1400 for Zepzelca C1 + Zometa. Assessment unremarkable except dyspnea with exertion, and hand/leg cramps. R chest port accessed without issue and positive blood return noted. Labs obtained- CBCap, Hepatic Function Panel, Chem 8 I-stat. Ionized Calcium low on I-stat, BMP sent to lab. Pt to MD office for follow-up appt. Patient denied having any symptoms of COVID-19, i.e. SOB, coughing, fever, or generally not feeling well. Also denies having been exposed to COVID-19 recently or having had any recent contact with family/friend that has a pending COVID test. 
 
Visit Vitals BP (!) 198/86 (BP 1 Location: Left upper arm, BP Patient Position: Sitting) Pulse 87 Temp 97.1 °F (36.2 °C) Resp 18 Ht 5' 3\" (1.6 m) Wt 50.3 kg (111 lb) SpO2 95% BMI 19.66 kg/m² Recent Results (from the past 12 hour(s)) HEPATIC FUNCTION PANEL Collection Time: 03/01/21  2:11 PM  
Result Value Ref Range Protein, total 7.2 6.4 - 8.2 g/dL Albumin 3.6 3.5 - 5.0 g/dL Globulin 3.6 2.0 - 4.0 g/dL A-G Ratio 1.0 (L) 1.1 - 2.2 Bilirubin, total 0.8 0.2 - 1.0 MG/DL Bilirubin, direct 0.2 0.0 - 0.2 MG/DL Alk. phosphatase 101 45 - 117 U/L  
 AST (SGOT) 33 15 - 37 U/L  
 ALT (SGPT) 32 12 - 78 U/L  
CBC WITH 3 PART DIFF Collection Time: 03/01/21  2:11 PM  
Result Value Ref Range WBC 6.0 3.6 - 11.0 K/uL  
 RBC 4.67 3.80 - 5.20 M/uL  
 HGB 14.4 11.5 - 16.0 g/dL HCT 40.5 35.0 - 47.0 % MCV 86.7 80.0 - 99.0 FL  
 MCH 30.8 26.0 - 34.0 PG  
 MCHC 35.6 30.0 - 36.5 g/dL  
 RDW 13.2 11.8 - 15.8 % PLATELET 963 (L) 054 - 400 K/uL NEUTROPHILS 77 (H) 32 - 75 % MIXED CELLS 11 3.2 - 16.9 % LYMPHOCYTES 12 12 - 49 % ABS. NEUTROPHILS 4.7 1.8 - 8.0 K/UL  
 ABS. MIXED CELLS 0.6 0.2 - 1.2 K/uL  
 ABS. LYMPHOCYTES 0.7 (L) 0.8 - 3.5 K/UL  
 DF AUTOMATED METABOLIC PANEL, BASIC Collection Time: 03/01/21  2:11 PM  
Result Value Ref Range Sodium 124 (L) 136 - 145 mmol/L Potassium 3.8 3.5 - 5.1 mmol/L Chloride 92 (L) 97 - 108 mmol/L  
 CO2 26 21 - 32 mmol/L Anion gap 6 5 - 15 mmol/L Glucose 75 65 - 100 mg/dL BUN 14 6 - 20 MG/DL Creatinine 0.51 (L) 0.55 - 1.02 MG/DL  
 BUN/Creatinine ratio 27 (H) 12 - 20 GFR est AA >60 >60 ml/min/1.73m2 GFR est non-AA >60 >60 ml/min/1.73m2 Calcium 8.7 8.5 - 10.1 MG/DL  
POC CHEM8 Collection Time: 03/01/21  2:14 PM  
Result Value Ref Range Calcium, ionized (POC) 1.09 (L) 1.12 - 1.32 mmol/L Sodium (POC) 124 (L) 136 - 145 mmol/L Potassium (POC) 4.0 3.5 - 5.1 mmol/L Chloride (POC) 90 (L) 98 - 107 mmol/L  
 CO2 (POC) 26 21 - 32 mmol/L Anion gap (POC) 13 10 - 20 mmol/L Glucose (POC) 79 65 - 100 mg/dL BUN (POC) 14 9 - 20 mg/dL Creatinine (POC) 0.5 (L) 0.6 - 1.3 mg/dL GFRAA, POC >60 >60 ml/min/1.73m2 GFRNA, POC >60 >60 ml/min/1.73m2 Hematocrit (POC) 43 35.0 - 47.0 % Comment Notified RN or MD immediately by  The following medications administered: 
NS @ [facility] Aloxi 0.25 mg IVP Decadron 8 mg IVP Zepzelca 4.8 mg IV over 1 hour Zometa 3.5 mg IV over 15 minutes Visit Vitals BP (!) 158/81 (BP 1 Location: Left upper arm, BP Patient Position: Sitting) Pulse 77 Temp 97.1 °F (36.2 °C) Resp 18 Ht 5' 3\" (1.6 m) Wt 50.3 kg (111 lb) SpO2 95% Breastfeeding No  
BMI 19.66 kg/m² Pt tolerated treatment well. No adverse reaction noted. Port flushed per policy and needle removed, 2x2 and paper tape placed. Pt discharged ambulatory in no acute distress at 1710, accompanied by self. Next appointment 3/25/21 @ 0800.

## 2021-03-23 NOTE — PROGRESS NOTES
Gregorio Avina is a 66 y.o. female here for follow up for small cell lung cancer. Treatment today:  Cycle 2 Day 1 Lurbinectedin Receiving Zometa today. 1. Have you been to the ER, urgent care clinic since your last visit? Hospitalized since your last visit? no 
 
2. Have you seen or consulted any other health care providers outside of the [address] [place] [address] since your last visit? Include any pap smears or colon screening. no 
 
Pt states she has been very fatigued. She did have her 1st COVID shot so they may have something to do with it.

## 2021-03-25 NOTE — PROGRESS NOTES
Pt arrived to [facility] ambulatory in no acute distress at 0800 for Zepzelca C2.  Assessment unremarkable except fatigue. R chest port accessed without issue and positive blood return noted.  Labs obtained, CBC, Hepatic Panel, Chem8. Patient denied having any symptoms of COVID-19, i.e. SOB, coughing, fever, or generally not feeling well.   Also denies having been exposed to COVID-19 recently or having had any recent contact with family/friend that has a pending COVID test.    Visit Vitals  BP (!) 163/84 (BP 1 Location: Left upper arm, BP Patient Position: Sitting)   Pulse 81   Temp 97.7 °F (36.5 °C)   Resp 18   Ht 5' 3\" (1.6 m)   Wt 49.8 kg (109 lb 11.2 oz)   SpO2 96%   BMI 19.43 kg/m²       Creatinine Clearance 65.6    The following medications administered:  Medications Administered     0.9% sodium chloride infusion     Admin Date  03/25/2021 Action  New Bag Dose  25 mL/hr Rate  25 mL/hr Route  IntraVENous Administered By  Laxmi Trinidad RN          dexamethasone (DECADRON) 4 mg/mL injection 8 mg     Admin Date  03/25/2021 Action  Given Dose  8 mg Route  IntraVENous Administered By  Laxmi Trinidad RN          heparin (porcine) pf 300-500 Units     Admin Date  03/25/2021 Action  Given Dose  500 Units Route  InterCATHeter Administered By  Laxmi Trinidad RN          lurbinectedin (ZEPZELCA) 4.8 mg in 0.9% sodium chloride 100 mL, overfill volume 10 mL chemo infusion     Admin Date  03/25/2021 Action  Given Dose  4.8 mg Rate  119.6 mL/hr Route  IntraVENous Administered By  Laxmi Trinidad RN          palonosetron HCl (ALOXI) injection 0.25 mg     Admin Date  03/25/2021 Action  Given Dose  0.25 mg Route  IntraVENous Administered By  Laxmi Trinidad RN          sodium chloride (NS) flush 10 mL     Admin Date  03/25/2021 Action  Given Dose  10 mL Route  IntraVENous Administered By  Laxmi Trinidad RN           Admin Date  03/25/2021 Action  Given Dose  10 mL Route  IntraVENous Administered By  Amber Swenson A, RN          zoledronic acid (ZOMETA) 4 mg/100mL infusion     Admin Date  03/25/2021 Action  Given Dose  4 mg Rate  400 mL/hr Route  IntraVENous Administered By  Elijah Trinidad RN              Visit Vitals  BP (!) 154/78   Pulse 82   Temp 97.7 °F (36.5 °C)   Resp 18   Ht 5' 3\" (1.6 m)   Wt 49.8 kg (109 lb 11.2 oz)   SpO2 96%   BMI 19.43 kg/m²       Pt tolerated treatment well. Port flushed per policy and de-accessed, 2x2 and tape placed.  Pt discharged ambulatory in no acute distress at 1130, accompanied by self. Next appointment 4/15/21 at 0800.

## 2021-03-25 NOTE — LETTER
3/25/2021 Patient: Joanna Kaufman YOB: 1950 Date of Visit: 3/25/2021 [phone] Via Fax: [phone] Dear Leo Kaufman MD, Thank you for referring Ms. Glenda Wilson to [address] [place] [address] for evaluation. My notes for this consultation are attached. If you have questions, please do not hesitate to call me. I look forward to following your patient along with you.  
 
 
Sincerely, 
 
Gino Feng, NP

## 2021-03-25 NOTE — PROGRESS NOTES
[facility] 
at [facility] [address] [facility] [facility], [address] 
[phone] Oncology Follow Up Note Patient: Devon Bueno MRN: [id number]  SSN: [id number] YOB: 1950  Age: 66 y.o. Sex: female Diagnosis 1. 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 Palliative chemotherapy Carboplatin/etoposide/durvalumab - s/p 6 cycles Durvalumab monotherapy - s/p 4 cycles - last cycle 1/27/2021 Lubrinectidin, cycle 2 day 1 Subjective:  
  
Devon Bueno is a 66 y.o. 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 She has been experiencing mild dyspnea for over 2 years. Occasional cough, no phlegm or hemoptysis. She has some pain and discomfort in the left chest wall and RUQ. The pain is not bad and she does not take medicines. She is also having insomnia. She works for [employer] [address] [place] [address]. She was seen in the ED. CT showed a mass in the LLL invading into the mediastinum and hepatic metastasis. She denies headache or bone pains. CT guided bx established the diagnosis. Bone scan shows widespread bony metastasis. Ms. Nadeem Schroeder is receiving palliative chemotherapy. She said the first cycle of the treatment \"kicked her butt\". She denies a headache at this time. She continues to work and is currently downsizing her home. Review of Systems: 
 
Constitutional: fatigue Eyes: negative Ears, Nose, Mouth, Throat, and Face: negative Respiratory: negative Cardiovascular: negative Gastrointestinal: discomfort in around her abdomen Genitourinary:negative Integument/Breast: negative Hematologic/Lymphatic: negative Musculoskeletal: negative Neurological: negative ROS was pulled in from a previous visit.  No changes were made d/t symptoms remain the same. Past Medical History:  
Diagnosis Date  CAD (coronary artery disease)  Cancer ([address] lung Past Surgical History:  
Procedure Laterality Date  HX HEART CATHETERIZATION    
 HX HYSTERECTOMY  IR BX TRANSCATHETER  2020  IR INSERT TUNL CVC W PORT OVER 5 YEARS  2020 Family History Problem Relation Age of Onset  Heart Disease Father  Cancer Sister Social History Tobacco Use  Smoking status: Former Smoker Quit date: 2020 Years since quittin.8  Smokeless tobacco: Never Used Substance Use Topics  Alcohol use: Never Frequency: Never Prior to Admission medications Medication Sig Start Date End Date Taking? Authorizing Provider  
losartan (COZAAR) 25 mg tablet Take  by mouth daily. Yes Provider, Historical  
atorvastatin (LIPITOR) 10 mg tablet Take  by mouth daily. Yes Provider, Historical  
lidocaine-prilocaine (EMLA) topical cream Apply  to affected area as needed for Pain. 20  Yes Marques Willis MD  
prochlorperazine (COMPAZINE) 10 mg tablet Take 0.5 Tabs by mouth every six (6) hours as needed for Nausea. 20  Yes Marques Willis MD  
budesonide/formoterol fumarate (SYMBICORT IN) Take  by inhalation. Yes Provider, Historical  
clopidogrel (PLAVIX) 75 mg tab Take 1 Tab by mouth daily. 19  Yes Yolanda Underwood MD  
aspirin delayed-release 81 mg tablet Take 81 mg by mouth daily. Yes Provider, Historical  
cholecalciferol (VITAMIN D3) 1,000 unit cap Take  by mouth daily. Yes Provider, Historical  
calcium carbonate (TUMS) 200 mg calcium (500 mg) chew Take 1 Tab by mouth daily. Yes Provider, Historical  
  
 
 
Allergies Allergen Reactions  Codeine Itching Objective:  
 
Visit Vitals BP (!) 163/84 Pulse 81 Temp 97.7 °F (36.5 °C) Resp 18 Ht 5' 3\" (1.6 m) Wt 109 lb (49.4 kg) SpO2 96% BMI 19.31 kg/m² Pain Scale: 0 - No pain/10 Physical Exam: GENERAL: alert, cooperative, no distress, appears stated age EYE: conjunctivae/corneas clear LYMPHATIC: Cervical, supraclavicular, and axillary nodes normal.  
THROAT & NECK: normal and no erythema or exudates noted. LUNG: clear to auscultation bilaterally HEART: regular rate and rhythm ABDOMEN: soft, non-tender. EXTREMITIES:  extremities normal, atraumatic, no cyanosis or edema SKIN: alopecia - improving NEUROLOGIC: AOx3. Gait normal.  
 
Physical exam was pulled in from a previous visit. No changes were made d/t physical exam remains the same. CT Results (most recent): 
[place] [facility] [place] on 02/17/21 CT ABD PELV W CONT Narrative INDICATION: Small cell carcinoma of left lower lung, metastatic to liver. Restaging disease COMPARISON: 11/12/2020 TECHNIQUE:  Following the uneventful intravenous administration of 100 cc Isovue-300, 5 mm axial images were obtained through the chest, abdomen, and 
pelvis. Oral contrast was present. Coronal and sagittal reformats were 
generated. CT dose reduction was achieved through use of a standardized 
protocol tailored for this examination and automatic exposure control for dose 
modulation. FINDINGS: 
 
CHEST WALL: No mass or axillary lymphadenopathy. THYROID: No nodule. MEDIASTINUM: AP window nodes measure up to 1.5 x 1.2 cm and 1.4 x 1.1 cm. The 
subcarinal node measures 0.9 x 0.3 cm. PAULINA: The left hilar node measures 1.6 x 2.4 cm and 1.4 x 1.8 cm. THORACIC AORTA: No dissection or aneurysm. MAIN PULMONARY ARTERY: Normal in caliber. TRACHEA/BRONCHI: Patent. ESOPHAGUS: No wall thickening or dilatation. HEART: Coronary artery calcification. PLEURA: No effusion or pneumothorax. LUNGS: New left lower lobe bilobed mass or coalescence of masses that measure 4.9 x 2.3 cm. Additional multiple microvascular in the left lower lobe. Underlying emphysematous change. LIVER: No biliary dilatation.  Innumerable hepatic metastases, the largest of 
which has increased from 5 mm to 2.4 cm. There are at least 10 lesions that are 
larger than a centimeter on the current study. A few new lesions are also 
identified. GALLBLADDER: Unremarkable. SPLEEN: No mass. PANCREAS: No mass or ductal dilatation. ADRENALS: Unremarkable. KIDNEYS: No mass, calculus, or hydronephrosis. Low position of the left kidney STOMACH: Unremarkable. SMALL BOWEL: No dilatation or wall thickening. COLON: No dilatation or wall thickening. APPENDIX: Unremarkable. Axial image 94 PERITONEUM: No ascites or pneumoperitoneum. RETROPERITONEUM: No lymphadenopathy or aortic aneurysm. REPRODUCTIVE ORGANS: Prior hysterectomy URINARY BLADDER: No mass or calculus. BONES: Innumerable osseous disease. These appear denser than on the prior study 
but have not definitely changed in number or size is. ADDITIONAL COMMENTS: N/A Impression Progression of disease with increase in number and sizes of hepatic, pulmonary, 
and mediastinal/hilar metastases Increased density of the multiple osseous metastases. I personally reviewed the images. Disease progression in the lung and liver. Lab Results Component Value Date/Time WBC 4.9 03/25/2021 08:04 AM  
 HGB 12.4 03/25/2021 08:04 AM  
 Hematocrit (POC) 37 03/25/2021 08:13 AM  
 HCT 35.3 03/25/2021 08:04 AM  
 PLATELET 986 03/09/8918 08:04 AM  
 MCV 86.1 03/25/2021 08:04 AM  
 
 
Lab Results Component Value Date/Time  Sodium 124 (L) 03/01/2021 02:11 PM  
 Potassium 3.8 03/01/2021 02:11 PM  
 Chloride 92 (L) 03/01/2021 02:11 PM  
 CO2 26 03/01/2021 02:11 PM  
 Anion gap 6 03/01/2021 02:11 PM  
 Glucose 75 03/01/2021 02:11 PM  
 BUN 14 03/01/2021 02:11 PM  
 Creatinine 0.51 (L) 03/01/2021 02:11 PM  
 BUN/Creatinine ratio 27 (H) 03/01/2021 02:11 PM  
 GFR est AA >60 03/01/2021 02:11 PM  
 GFR est non-AA >60 03/01/2021 02:11 PM  
 Calcium 8.7 03/01/2021 02:11 PM  
 Bilirubin, total 0.7 03/25/2021 08:04 AM  
 Alk. phosphatase 184 (H) 03/25/2021 08:04 AM  
 Protein, total 6.8 03/25/2021 08:04 AM  
 Albumin 3.4 (L) 03/25/2021 08:04 AM  
 Globulin 3.4 03/25/2021 08:04 AM  
 A-G Ratio 1.0 (L) 03/25/2021 08:04 AM  
 ALT (SGPT) 19 03/25/2021 08:04 AM  
 
MRI Brain: (3/2/2021) IMPRESSION 1. Interval development of metastasis right cerebral hemisphere and left 
cerebellum. 2. Multifocal osseous metastatic disease similar to or possibly improved. 3. Stable nonspecific suspected chronic white matter small vessel ischemic 
change. Assessment: 1. 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 Blood counts are acceptable. Results reviewed with the patient. MRI brain (3/2/2021) shows metastasis 2. Bone metastasis Receiving zoledronic acid 3. Pedal edema - bilateral 
 
No pain or redness. Will decrease after elevation. 1+ 
Recommended compression stockings 4. Chemotherapy induced anemia - resolved Observation 5. Severe protein calorie malnutrition - improved Dietician consultation Protein supplements 6. Hypocalcemia Calcium OTC daily 7. Brain metastasis Refer to Dr. Pattie Wood for consideration of Gamma knife treatment. Plan:  
 
> Continue Lurbinectedin today 
> Continue zometa every 4 weeks 
> Continue Calcium daily 
> Referral to Dr. Pattie Wood for consideration of gamma knife 
> Follow-up every 3 weeks I performed a history and physical examination of the patient and discussed his management with the NPP.  I reviewed the NPP note and agree with the documented findings and plan of care. The patient was seen in conjunction with Ms. Xie. Ms. Deep Vega is a lady with extensive stage SCLC. She is receiving Lubrinectidin second line. She has some fatigue. I am referring her for Gamma knife for two small lesions in the brain. Exam reveals thin women. Signed by: Rufina Lindquist MD 
                   March 25, 2021 
 
 
CC. Nicholas Adorno MD 
CC.  Charlie Dakin, MD

## 2021-04-15 NOTE — LETTER
4/15/2021 Patient: Shravan Wong YOB: 1950 Date of Visit: 4/15/2021 Dorisann Prader, [place] [phone] Via Fax: [phone] Dear Dorisann Prader, MD, Thank you for referring Ms. Cyndee Wilkins to [address] [place] [address] for evaluation. My notes for this consultation are attached. If you have questions, please do not hesitate to call me. I look forward to following your patient along with you.  
 
 
Sincerely, 
 
Rocky Bennett, NP

## 2021-04-15 NOTE — PROGRESS NOTES
Outpatient [facility]fusion [facility] - Chemotherapy Progress Note 
 
0956 Pt admit to [facility] for Zepzelca/C3/Zometa ambulatory in stable condition. Assessment completed. No new concerns voiced. Port accessed with positive blood return. Labs drawn per order and sent. Line flushed, clamped, Curos Cap applied to end clave. Awaiting lab results. Patient denies SOB, fever, cough, general not feeling well. Patient denies recent exposure to someone who has tested positive for COVID-19. Patient  denies having contact with anyone who has a pending COVID test.  
 
0830 Pt left [facility] for MD appointment 
 
5646 Return to [facility]; line flushed, NS infusing Visit Vitals BP (!) 164/82 Pulse 79 Temp 97.4 °F (36.3 °C) Resp 16 Ht 5' 3\" (1.6 m) Wt 49.6 kg (109 lb 4.8 oz) Breastfeeding No  
BMI 19.36 kg/m² Medications Administered 0.9% sodium chloride infusion Admin Date 
04/15/2021 Action New Bag Dose 25 mL/hr Rate 25 mL/hr Route IntraVENous Administered By Tete Araujo RN  
  
  
 0.9% sodium chloride injection 10 mL Admin Date 
04/15/2021 Action Given Dose 
10 mL Route IntraVENous Administered By Tete Araujo RN  
  
  
 dexamethasone (DECADRON) 4 mg/mL injection 8 mg Admin Date 
04/15/2021 Action Given Dose 8 mg Route IntraVENous Administered By Tete Araujo RN  
  
  
 heparin (porcine) pf 300-500 Units Admin Date 
04/15/2021 Action Given Dose 
500 Units Route InterCATHeter Administered By Tete Araujo RN  
  
  
 lurbinectedin [facility]) 4.8 mg in 0.9% sodium chloride 100 mL, overfill volume 10 mL chemo infusion Admin Date 
04/15/2021 Action Given Dose 4.8 mg Rate 
119.6 mL/hr Route IntraVENous Administered By Tete Araujo RN  
  
  
 palonosetron HCl (ALOXI) injection 0.25 mg   
 Admin Date 
04/15/2021 Action Given Dose 0.25 mg Route IntraVENous Administered By Tete Araujo RN  
  
  
 sodium chloride (NS) flush 10 mL  Admin Date 04/15/2021 Action Given Dose 
10 mL Route IntraVENous Administered By Venessa Sandhu RN  
  
  
 zoledronic acid (ZOMETA) 3.5 mg in 0.9% sodium chloride 100 mL IVPB Admin Date 
04/15/2021 Action Given Dose 3.5 mg Route IntraVENous Administered By Venessa Sandhu, PARUL  
  
  
  
 
 
 
1145 Pt tolerated treatment well. Port maintained positive blood return throughout treatment, flushed with positive blood return at conclusion, heparinized and de-accessed. D/c home ambulatory in no distress. Pt aware of next [facility] appointment scheduled for 05/06/2021. Recent Results (from the past 12 hour(s)) METABOLIC PANEL, COMPREHENSIVE Collection Time: 04/15/21  7:59 AM  
Result Value Ref Range Sodium 124 (L) 136 - 145 mmol/L Potassium 4.1 3.5 - 5.1 mmol/L Chloride 93 (L) 97 - 108 mmol/L  
 CO2 24 21 - 32 mmol/L Anion gap 7 5 - 15 mmol/L Glucose 88 65 - 100 mg/dL BUN 11 6 - 20 MG/DL Creatinine 0.53 (L) 0.55 - 1.02 MG/DL  
 BUN/Creatinine ratio 21 (H) 12 - 20 GFR est AA >60 >60 ml/min/1.73m2 GFR est non-AA >60 >60 ml/min/1.73m2 Calcium 8.2 (L) 8.5 - 10.1 MG/DL Bilirubin, total 0.6 0.2 - 1.0 MG/DL  
 ALT (SGPT) 16 12 - 78 U/L  
 AST (SGOT) 20 15 - 37 U/L Alk. phosphatase 202 (H) 45 - 117 U/L Protein, total 6.2 (L) 6.4 - 8.2 g/dL Albumin 3.4 (L) 3.5 - 5.0 g/dL Globulin 2.8 2.0 - 4.0 g/dL A-G Ratio 1.2 1.1 - 2.2    
CBC WITH 3 PART DIFF Collection Time: 04/15/21  8:12 AM  
Result Value Ref Range WBC 2.8 (L) 3.6 - 11.0 K/uL  
 RBC 3.66 (L) 3.80 - 5.20 M/uL  
 HGB 11.7 11.5 - 16.0 g/dL HCT 32.1 (L) 35.0 - 47.0 % MCV 87.7 80.0 - 99.0 FL  
 MCH 32.0 26.0 - 34.0 PG  
 MCHC 36.4 30.0 - 36.5 g/dL  
 RDW 16.4 (H) 11.8 - 15.8 % PLATELET 149 477 - 951 K/uL NEUTROPHILS 62 32 - 75 % MIXED CELLS 23 (H) 3.2 - 16.9 % LYMPHOCYTES 15 12 - 49 % ABS. NEUTROPHILS 1.8 1.8 - 8.0 K/UL  
 ABS. MIXED CELLS 0.6 0.2 - 1.2 K/uL  
 ABS.  LYMPHOCYTES 0.4 (L) 0.8 - 3.5 K/UL  
 DF AUTOMATED

## 2021-04-15 NOTE — PROGRESS NOTES
Tanisha Reinoso is a 66 y.o. female Chief Complaint Patient presents with  Follow-up  
  small cell lung cancer  Cancer 1. Have you been to the ER, urgent care clinic since your last visit? Hospitalized since your last visit? No 
 
2. Have you seen or consulted any other health care providers outside of the [address] [place] [address] since your last visit? Include any pap smears or colon screening. Saw Dr. Chelsie Scruggs on 3/30/21 and did not need a follow up for treatment on 4/7/21 but has scheduled follow up on 5/11/21 with a repeat MRI. Saw Dr. Mary Cancino on Friday 4/9/21 for a regular checkup.

## 2021-04-15 NOTE — PROGRESS NOTES
[address] [facility] 
at [facility] [address] [facility], [address] [place] [phone] Oncology Follow Up Note Patient: Jackie Sosa MRN: [id number]  SSN: [id number] YOB: 1950  Age: 66 y.o. Sex: female Diagnosis 1. 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 Palliative chemotherapy Carboplatin/etoposide/durvalumab - s/p 6 cycles Durvalumab monotherapy - s/p 4 cycles - last cycle 1/27/2021 Lubrinectidin - Cycle 3 Day 1 Subjective:  
  
Jackie Sosa is a 66 y.o. 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 She has been experiencing mild dyspnea for over 2 years. Occasional cough, no phlegm or hemoptysis. She has some pain and discomfort in the left chest wall and RUQ. The pain is not bad and she does not take medicines. She is also having insomnia. She works for [email or web address] [address] [place] [address]. She was seen in the ED. CT showed a mass in the LLL invading into the mediastinum and hepatic metastasis. She denies headache or bone pains. CT guided bx established the diagnosis. Bone scan shows widespread bony metastasis. Recent MRI brain showed metastases. She saw Dr. Garland Whitlock for consideration of gamma knife and currently no plans for treatment. Ms. Ludin Fieror is receiving palliative chemotherapy. She notes fatigue and cramping in her hands and legs but otherwise has no new complaints. Denies headaches or bone pains. Review of Systems: 
 
Constitutional: fatigue Eyes: negative Ears, Nose, Mouth, Throat, and Face: negative Respiratory: negative Cardiovascular: negative Gastrointestinal: negative Genitourinary:negative Integument/Breast: negative Hematologic/Lymphatic: negative Musculoskeletal: cramping in hands and legs Neurological: negative Past Medical History:  
Diagnosis Date  CAD (coronary artery disease)  Cancer ([address] lung Past Surgical History:  
Procedure Laterality Date  HX HEART CATHETERIZATION    
 HX HYSTERECTOMY  IR BX TRANSCATHETER  2020  IR INSERT TUNL CVC W PORT OVER 5 YEARS  2020 Family History Problem Relation Age of Onset  Heart Disease Father  Cancer Sister Social History Tobacco Use  Smoking status: Former Smoker Quit date: 2020 Years since quittin.8  Smokeless tobacco: Never Used Substance Use Topics  Alcohol use: Never Frequency: Never Prior to Admission medications Medication Sig Start Date End Date Taking? Authorizing Provider  
losartan (COZAAR) 25 mg tablet Take  by mouth daily. Yes Provider, Historical  
lidocaine-prilocaine (EMLA) topical cream Apply  to affected area as needed for Pain. 20  Yes Americo Mehta MD  
budesonide/formoterol fumarate (SYMBICORT IN) Take  by inhalation. Yes Provider, Historical  
aspirin delayed-release 81 mg tablet Take 81 mg by mouth daily. Yes Provider, Historical  
cholecalciferol (VITAMIN D3) 1,000 unit cap Take  by mouth daily. Yes Provider, Historical  
calcium carbonate (TUMS) 200 mg calcium (500 mg) chew Take 1 Tab by mouth daily. Yes Provider, Historical  
atorvastatin (LIPITOR) 10 mg tablet Take  by mouth daily. Provider, Historical  
prochlorperazine (COMPAZINE) 10 mg tablet Take 0.5 Tabs by mouth every six (6) hours as needed for Nausea. 20   Americo Mehta MD  
clopidogrel (PLAVIX) 75 mg tab Take 1 Tab by mouth daily. 19   Todd Blevins MD  
  
 
 
Allergies Allergen Reactions  Codeine Itching Objective:  
 
Visit Vitals BP (!) 146/76 (BP 1 Location: Left upper arm, BP Patient Position: Sitting) Pulse 73 Temp 97.4 °F (36.3 °C) Resp 18 Ht 5' 3\" (1.6 m) Wt 109 lb 4.8 oz (49.6 kg) SpO2 98% BMI 19.36 kg/m² Pain Scale: /10 Pain Location:  
 
 
Physical Exam: 
 
GENERAL: alert, cooperative, no distress, appears stated age EYE: conjunctivae/corneas clear LYMPHATIC: Cervical, supraclavicular, and axillary nodes normal.  
THROAT & NECK: normal and no erythema or exudates noted. LUNG: clear to auscultation bilaterally HEART: regular rate and rhythm ABDOMEN: soft, non-tender. EXTREMITIES:  extremities normal, atraumatic, no cyanosis or edema SKIN: negative NEUROLOGIC: AOx3. Gait normal.  
 
Physical exam was pulled in from a previous visit. No changes were made d/t physical exam remains the same. CT Results (most recent): 
Results from [facility] Encounter encounter on 02/17/21 CT ABD PELV W CONT Narrative INDICATION: Small cell carcinoma of left lower lung, metastatic to liver. Restaging disease COMPARISON: 11/12/2020 TECHNIQUE:  Following the uneventful intravenous administration of 100 cc Isovue-300, 5 mm axial images were obtained through the chest, abdomen, and 
pelvis. Oral contrast was present. Coronal and sagittal reformats were 
generated. CT dose reduction was achieved through use of a standardized 
protocol tailored for this examination and automatic exposure control for dose 
modulation. FINDINGS: 
 
CHEST WALL: No mass or axillary lymphadenopathy. THYROID: No nodule. MEDIASTINUM: AP window nodes measure up to 1.5 x 1.2 cm and 1.4 x 1.1 cm. The 
subcarinal node measures 0.9 x 0.3 cm. PAULINA: The left hilar node measures 1.6 x 2.4 cm and 1.4 x 1.8 cm. THORACIC AORTA: No dissection or aneurysm. MAIN PULMONARY ARTERY: Normal in caliber. TRACHEA/BRONCHI: Patent. ESOPHAGUS: No wall thickening or dilatation. HEART: Coronary artery calcification. PLEURA: No effusion or pneumothorax. LUNGS: New left lower lobe bilobed mass or coalescence of masses that measure 4.9 x 2.3 cm. Additional multiple microvascular in the left lower lobe. Underlying emphysematous change.  
 
LIVER: No biliary dilatation. Innumerable hepatic metastases, the largest of 
which has increased from 5 mm to 2.4 cm. There are at least 10 lesions that are 
larger than a centimeter on the current study. A few new lesions are also 
identified. GALLBLADDER: Unremarkable. SPLEEN: No mass. PANCREAS: No mass or ductal dilatation. ADRENALS: Unremarkable. KIDNEYS: No mass, calculus, or hydronephrosis. Low position of the left kidney STOMACH: Unremarkable. SMALL BOWEL: No dilatation or wall thickening. COLON: No dilatation or wall thickening. APPENDIX: Unremarkable. Axial image 94 PERITONEUM: No ascites or pneumoperitoneum. RETROPERITONEUM: No lymphadenopathy or aortic aneurysm. REPRODUCTIVE ORGANS: Prior hysterectomy URINARY BLADDER: No mass or calculus. BONES: Innumerable osseous disease. These appear denser than on the prior study 
but have not definitely changed in number or size is. ADDITIONAL COMMENTS: N/A Impression Progression of disease with increase in number and sizes of hepatic, pulmonary, 
and mediastinal/hilar metastases Increased density of the multiple osseous metastases. Lab Results Component Value Date/Time WBC 2.8 (L) 04/15/2021 08:12 AM  
 HGB 11.7 04/15/2021 08:12 AM  
 Hematocrit (POC) 37 03/25/2021 08:13 AM  
 HCT 32.1 (L) 04/15/2021 08:12 AM  
 PLATELET 352 21/65/5411 08:12 AM  
 MCV 87.7 04/15/2021 08:12 AM  
 
 
Lab Results Component Value Date/Time Sodium 124 (L) 04/15/2021 07:59 AM  
 Potassium 4.1 04/15/2021 07:59 AM  
 Chloride 93 (L) 04/15/2021 07:59 AM  
 CO2 24 04/15/2021 07:59 AM  
 Anion gap 7 04/15/2021 07:59 AM  
 Glucose 88 04/15/2021 07:59 AM  
 BUN 11 04/15/2021 07:59 AM  
 Creatinine 0.53 (L) 04/15/2021 07:59 AM  
 BUN/Creatinine ratio 21 (H) 04/15/2021 07:59 AM  
 GFR est AA >60 04/15/2021 07:59 AM  
 GFR est non-AA >60 04/15/2021 07:59 AM  
 Calcium 8.2 (L) 04/15/2021 07:59 AM  
 Bilirubin, total 0.6 04/15/2021 07:59 AM  
 Alk.  phosphatase 202 (H) 04/15/2021 07:59 AM  
 Protein, total 6.2 (L) 04/15/2021 07:59 AM  
 Albumin 3.4 (L) 04/15/2021 07:59 AM  
 Globulin 2.8 04/15/2021 07:59 AM  
 A-G Ratio 1.2 04/15/2021 07:59 AM  
 ALT (SGPT) 16 04/15/2021 07:59 AM  
 
MRI Brain: (3/2/2021) IMPRESSION 1. Interval development of metastasis right cerebral hemisphere and left 
cerebellum. 2. Multifocal osseous metastatic disease similar to or possibly improved. 3. Stable nonspecific suspected chronic white matter small vessel ischemic 
change. Assessment: 1. 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 Blood counts are acceptable. Results reviewed with the patient. MRI brain (3/2/2021) shows metastasis 2. Bone metastasis Receiving zoledronic acid 3. Pedal edema - bilateral 
 
No pain or redness. Will decrease after elevation. 1+ 
Recommended compression stockings 4. Chemotherapy induced anemia - resolved Observation 5. Severe protein calorie malnutrition - improved Dietician consultation Protein supplements 6. Hypocalcemia Calcium OTC daily 7. Brain metastasis Following with Dr. Boone Alvarez. Originally planned for gamma knife. Re-imaging showed shrinkage of brain lesions and gamma knife was cancelled. Dr. Boone Alvarez will continue to monitor. Asymptomatic 8. Hyponatremia Check serum osmolality, urine osmolality and urine sodium Increase sodium intake Plan:  
 
> Continue Lurbinectedin 
> Continue zometa every 3 weeks 
> Continue calcium daily 
> Check serum osmo, urine sodium and urine osmo 
> Follow-up in 3 weeks I performed a history and physical examination of the patient and discussed his management with the NPP. I reviewed the NPP note and agree with the documented findings and plan of care. The patient was seen in conjunction with Ms. Anton Mcdowell. Ms. Whitney Klinefelter is a lady with small cell lung ca metastatic to the the liver and brain, She is receiving lubrinectidin. She is doing fair. Exam reveals a thin women. Signed by: Farheen Malone MD 
                   April 15, 2021 
 
 
CC. Branden Coronado MD 
CC.  Faraz Maynard MD

## 2021-05-04 NOTE — PROGRESS NOTES
Gregg Espinoza is a 66 y.o. female here for follow up for met lung cancer. Treatment today: Cycle 4 Day 1 Lurbinectedin    1. Have you been to the ER, urgent care clinic since your last visit? Hospitalized since your last visit? no    2. Have you seen or consulted any other health care providers outside of the [address] [place] [address] since your last visit? Include any pap smears or colon screening. Carotid test end of April. Pt states she is doing well. No complaints.

## 2021-05-06 NOTE — PROGRESS NOTES
[facility] Progress Note Date: May 6, 2021 Name: Shravan Wong MRN: [id number] : 1950 Ms. Robertson arrived (ambulation) at 0800 and in no distress for cycle 4 day 1 Zepzelca/Zometa regimen. Assessment was completed, no acute issues at this time, no new complaints voiced. Pt continues to have constipation after treatment; however, stated she has been able to manage it with over the counter medications. Pt denied any recent invasive dental procedures. Covid Screening Patient denied having any symptoms of COVID-19, i.e. SOB, coughing, fever, or generally not feeling well. Also denies having been exposed to COVID-19 recently or having had any recent contact with family/friend that has a pending COVID test. 
 
 
Right chest port accessed without difficulty with 0.75 avalos needle; + blood return noted, labs drawn and sent. 0825:  Proceeded to appt with Dr. Zoraida Thompson. MsTherese Soliz vitals were reviewed. Patient Vitals for the past 12 hrs: 
 Temp Pulse Resp BP SpO2  
21 1142  73 14 (!) 141/77 100 % 21 0804 98.9 °F (37.2 °C) 87 16 (!) 169/78 96 % Lab results were obtained and reviewed. Recent Results (from the past 12 hour(s)) CBC WITH AUTOMATED DIFF Collection Time: 21  8:10 AM  
Result Value Ref Range WBC 3.4 (L) 3.6 - 11.0 K/uL  
 RBC 3.43 (L) 3.80 - 5.20 M/uL  
 HGB 10.9 (L) 11.5 - 16.0 g/dL HCT 30.8 (L) 35.0 - 47.0 % MCV 89.8 80.0 - 99.0 FL  
 MCH 31.8 26.0 - 34.0 PG  
 MCHC 35.4 30.0 - 36.5 g/dL  
 RDW 16.9 (H) 11.5 - 14.5 % PLATELET 312 181 - 176 K/uL MPV 9.4 8.9 - 12.9 FL  
 NRBC 0.0 0  WBC ABSOLUTE NRBC 0.00 0.00 - 0.01 K/uL NEUTROPHILS 61 32 - 75 % BAND NEUTROPHILS 6 % LYMPHOCYTES 14 12 - 49 % MONOCYTES 15 (H) 5 - 13 % EOSINOPHILS 0 0 - 7 % BASOPHILS 0 0 - 1 % METAMYELOCYTES 2 % MYELOCYTES 2 % IMMATURE GRANULOCYTES 0 0.0 - 0.5 % ABS. NEUTROPHILS 2.3 1.8 - 8.0 K/UL  
 ABS.  LYMPHOCYTES 0.5 (L) 0.8 - 3.5 K/UL  
 ABS. MONOCYTES 0.5 0.0 - 1.0 K/UL  
 ABS. EOSINOPHILS 0.0 0.0 - 0.4 K/UL  
 ABS. BASOPHILS 0.0 0.0 - 0.1 K/UL  
 ABS. IMM. GRANS. 0.0 0.00 - 0.04 K/UL  
 DF MANUAL    
 RBC COMMENTS ANISOCYTOSIS 
1+ METABOLIC PANEL, COMPREHENSIVE Collection Time: 05/06/21  8:10 AM  
Result Value Ref Range Sodium 123 (L) 136 - 145 mmol/L Potassium 3.7 3.5 - 5.1 mmol/L Chloride 91 (L) 97 - 108 mmol/L  
 CO2 26 21 - 32 mmol/L Anion gap 6 5 - 15 mmol/L Glucose 88 65 - 100 mg/dL BUN 14 6 - 20 MG/DL Creatinine 0.64 0.55 - 1.02 MG/DL  
 BUN/Creatinine ratio 22 (H) 12 - 20 GFR est AA >60 >60 ml/min/1.73m2 GFR est non-AA >60 >60 ml/min/1.73m2 Calcium 9.2 8.5 - 10.1 MG/DL Bilirubin, total 0.5 0.2 - 1.0 MG/DL  
 ALT (SGPT) 19 12 - 78 U/L  
 AST (SGOT) 24 15 - 37 U/L Alk. phosphatase 167 (H) 45 - 117 U/L Protein, total 6.6 6.4 - 8.2 g/dL Albumin 3.2 (L) 3.5 - 5.0 g/dL Globulin 3.4 2.0 - 4.0 g/dL A-G Ratio 0.9 (L) 1.1 - 2.2 Pre-medications  were administered as ordered and chemotherapy was initiated. Medication: 
Medications Administered 0.9% sodium chloride infusion Admin Date 05/06/2021 Action New Bag Dose 25 mL/hr Rate 25 mL/hr Route IntraVENous Administered By Chiara Mckenzie  
  
  
 0.9% sodium chloride injection 10 mL Admin Date 05/06/2021 Action Given Dose 
10 mL Route IntraVENous Administered By Chiara Mckenzie  
  
  
 dexamethasone (DECADRON) 4 mg/mL injection 8 mg Admin Date 05/06/2021 Action Given Dose 8 mg Route IntraVENous Administered By Chiara Mckenzie  
  
  
 heparin (porcine) pf 300-500 Units Admin Date 05/06/2021 Action Given Dose 
500 Units Route InterCATHeter Administered By Chiara Mckenzie  
  
  
 lurbinectedin (ZEPZELCA) 4.8 mg in 0.9% sodium chloride 100 mL, overfill volume 10 mL chemo infusion Admin Date 05/06/2021 Action Given Dose 4.8 mg Rate 
119.6 mL/hr Route IntraVENous Administered By 
Suresh Berumen RN  
  
  
 palonosetron HCl (ALOXI) injection 0.25 mg   
 Admin Date 05/06/2021 Action Given Dose 0.25 mg Route IntraVENous Administered By Chiara Mckenzie  
  
  
 sodium chloride (NS) flush 10 mL Admin Date 05/06/2021 Action Given Dose 
10 mL Route IntraVENous Administered By Radha Carter Admin Date 05/06/2021 Action Given Dose 
10 mL Route IntraVENous Administered By Chiara Mckenzie  
  
  
 zoledronic acid (ZOMETA) 3.5 mg in 0.9% sodium chloride 100 mL IVPB Admin Date 05/06/2021 Action Given Dose 3.5 mg Route IntraVENous Administered By Radha Carter Patient port flushed; heparinized; and de-accessed per protocol. Ms. Marilyn Melvin tolerated treatment well and was discharged from [place] [address] in stable condition at 1150. She is aware of when to return for her next appointment. Future Appointments Date Time Provider Abbie Rosario 5/27/2021  8:00 AM CHAIR 2 [facility] REG  
5/27/2021  8:30 AM Jose Veras NP ONC BS AMB  
6/17/2021  8:00 AM [place] INFUSION NURSE 1 [facility] REG  
7/8/2021  8:00 AM CHAIR 2 [facility] REG  
7/29/2021  8:00 AM [place] INFUSION NURSE 1 [facility] REG Millie Artis May 6, 2021

## 2021-05-06 NOTE — LETTER
5/6/2021 Patient: Tanisha Reinoso YOB: 1950 Date of Visit: 5/6/2021 [address] [phone] Via Fax: [phone] Dear Elsa Rincon MD, Thank you for referring Ms. Philip Andrew to [address] [place] [address] for evaluation. My notes for this consultation are attached. If you have questions, please do not hesitate to call me. I look forward to following your patient along with you.  
 
 
Sincerely, 
 
Ezequiel Mcnally NP

## 2021-05-06 NOTE — PROGRESS NOTES
2001 [facility] 
at [place] [address] [address] [facility] [address] [facility], [address] [place] [id number] Oncology Follow Up Note Patient: Tera Hayes MRN: [id number]  SSN: [id number] YOB: 1950  Age: 66 y.o. Sex: female Diagnosis 1. 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 Palliative chemotherapy Carboplatin/etoposide/durvalumab - s/p 6 cycles Durvalumab monotherapy - s/p 4 cycles - last cycle 1/27/2021 Lubrinectidin - Cycle 4 Day 1 Subjective:  
  
Tera Hayes is a 66 y.o. 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 She has been experiencing mild dyspnea for over 2 years. Occasional cough, no phlegm or hemoptysis. She has some pain and discomfort in the left chest wall and RUQ. The pain is not bad and she does not take medicines. She is also having insomnia. She works for [email or web address] [address] [place] [address]. She was seen in the ED. CT showed a mass in the LLL invading into the mediastinum and hepatic metastasis. She denies headache or bone pains. CT guided bx established the diagnosis. Bone scan shows widespread bony metastasis. Recent MRI brain showed metastases. She saw Dr. Chris Tijerina for consideration of gamma knife and currently no plans for treatment. She has a follow up and repeat MRI on May 11th. Ms. Esme Garza is receiving palliative chemotherapy. She continues to have intermittent cramping in her hands and this makes it difficult to work. She denies HA or decreased appetite. Review of Systems: 
 
Constitutional: fatigue Eyes: negative Ears, Nose, Mouth, Throat, and Face: negative Respiratory: negative Cardiovascular: negative Gastrointestinal: negative Genitourinary:negative Integument/Breast: negative Hematologic/Lymphatic: negative Musculoskeletal: cramping in hands and legs Neurological: negative ROS was pulled in from a previous visit. No changes were made d/t symptoms remain the same. Past Medical History:  
Diagnosis Date  CAD (coronary artery disease)  Cancer ([address] lung Past Surgical History:  
Procedure Laterality Date  HX HEART CATHETERIZATION    
 HX HYSTERECTOMY  IR BX TRANSCATHETER  2020  IR INSERT TUNL CVC W PORT OVER 5 YEARS  2020 Family History Problem Relation Age of Onset  Heart Disease Father  Cancer Sister Social History Tobacco Use  Smoking status: Former Smoker Quit date: 2020 Years since quittin.9  Smokeless tobacco: Never Used Substance Use Topics  Alcohol use: Never Frequency: Never Prior to Admission medications Medication Sig Start Date End Date Taking? Authorizing Provider  
losartan (COZAAR) 25 mg tablet Take  by mouth daily. Yes Provider, Historical  
atorvastatin (LIPITOR) 10 mg tablet Take  by mouth daily. Yes Provider, Historical  
lidocaine-prilocaine (EMLA) topical cream Apply  to affected area as needed for Pain. 20  Yes Amador Calvo MD  
prochlorperazine (COMPAZINE) 10 mg tablet Take 0.5 Tabs by mouth every six (6) hours as needed for Nausea. 20  Yes Amador Calvo MD  
budesonide/formoterol fumarate (SYMBICORT IN) Take  by inhalation. Yes Provider, Historical  
clopidogrel (PLAVIX) 75 mg tab Take 1 Tab by mouth daily. 19  Yes Barrie Barahona MD  
aspirin delayed-release 81 mg tablet Take 81 mg by mouth daily. Yes Provider, Historical  
cholecalciferol (VITAMIN D3) 1,000 unit cap Take  by mouth daily. Yes Provider, Historical  
calcium carbonate (TUMS) 200 mg calcium (500 mg) chew Take 1 Tab by mouth daily. Yes Provider, Historical  
  
 
 
Allergies Allergen Reactions  Codeine Itching Objective:  
 
Visit Vitals BP (!) 169/78 Pulse 87 Temp 98.9 °F (37.2 °C)  
Resp 16 Ht 5' 3\" (1.6 m) Wt 109 lb (49.4 kg) SpO2 96% BMI 19.31 kg/m² Pain Scale: 5/10 - not at this time. When she has cramping she associates this with pain of 5 Physical Exam: 
 
GENERAL: alert, cooperative, no distress, appears stated age EYE: conjunctivae/corneas clear LYMPHATIC: Cervical, supraclavicular, and axillary nodes normal.  
THROAT & NECK: normal and no erythema or exudates noted. LUNG:  
HEART: regular rate and rhythm ABDOMEN: soft, non-tender. EXTREMITIES:  extremities normal, atraumatic, no cyanosis or edema SKIN: negative NEUROLOGIC: AOx3. Gait normal.  
 
Physical exam was pulled in from a previous visit. No changes were made d/t physical exam remains the same. CT Results (most recent): 
Results from [facility] Encounter encounter on 02/17/21 CT ABD PELV W CONT Narrative INDICATION: Small cell carcinoma of left lower lung, metastatic to liver. Restaging disease COMPARISON: 11/12/2020 TECHNIQUE:  Following the uneventful intravenous administration of 100 cc Isovue-300, 5 mm axial images were obtained through the chest, abdomen, and 
pelvis. Oral contrast was present. Coronal and sagittal reformats were 
generated. CT dose reduction was achieved through use of a standardized 
protocol tailored for this examination and automatic exposure control for dose 
modulation. FINDINGS: 
 
CHEST WALL: No mass or axillary lymphadenopathy. THYROID: No nodule. MEDIASTINUM: AP window nodes measure up to 1.5 x 1.2 cm and 1.4 x 1.1 cm. The 
subcarinal node measures 0.9 x 0.3 cm. PAULINA: The left hilar node measures 1.6 x 2.4 cm and 1.4 x 1.8 cm. THORACIC AORTA: No dissection or aneurysm. MAIN PULMONARY ARTERY: Normal in caliber. TRACHEA/BRONCHI: Patent. ESOPHAGUS: No wall thickening or dilatation. HEART: Coronary artery calcification. PLEURA: No effusion or pneumothorax. LUNGS: New left lower lobe bilobed mass or coalescence of masses that measure 4.9 x 2. 3 cm. Additional multiple microvascular in the left lower lobe. Underlying emphysematous change. LIVER: No biliary dilatation. Innumerable hepatic metastases, the largest of 
which has increased from 5 mm to 2.4 cm. There are at least 10 lesions that are 
larger than a centimeter on the current study. A few new lesions are also 
identified. GALLBLADDER: Unremarkable. SPLEEN: No mass. PANCREAS: No mass or ductal dilatation. ADRENALS: Unremarkable. KIDNEYS: No mass, calculus, or hydronephrosis. Low position of the left kidney STOMACH: Unremarkable. SMALL BOWEL: No dilatation or wall thickening. COLON: No dilatation or wall thickening. APPENDIX: Unremarkable. Axial image 94 PERITONEUM: No ascites or pneumoperitoneum. RETROPERITONEUM: No lymphadenopathy or aortic aneurysm. REPRODUCTIVE ORGANS: Prior hysterectomy URINARY BLADDER: No mass or calculus. BONES: Innumerable osseous disease. These appear denser than on the prior study 
but have not definitely changed in number or size is. ADDITIONAL COMMENTS: N/A Impression Progression of disease with increase in number and sizes of hepatic, pulmonary, 
and mediastinal/hilar metastases Increased density of the multiple osseous metastases. Lab Results Component Value Date/Time WBC 3.4 (L) 05/06/2021 08:10 AM  
 HGB 10.9 (L) 05/06/2021 08:10 AM  
 Hematocrit (POC) 37 03/25/2021 08:13 AM  
 HCT 30.8 (L) 05/06/2021 08:10 AM  
 PLATELET 006 31/94/3064 08:10 AM  
 MCV 89.8 05/06/2021 08:10 AM  
 
 
Lab Results Component Value Date/Time  Sodium 123 (L) 05/06/2021 08:10 AM  
 Potassium 3.7 05/06/2021 08:10 AM  
 Chloride 91 (L) 05/06/2021 08:10 AM  
 CO2 26 05/06/2021 08:10 AM  
 Anion gap 6 05/06/2021 08:10 AM  
 Glucose 88 05/06/2021 08:10 AM  
 BUN 14 05/06/2021 08:10 AM  
 Creatinine 0.64 05/06/2021 08:10 AM  
 BUN/Creatinine ratio 22 (H) 05/06/2021 08:10 AM  
 GFR est AA >60 05/06/2021 08:10 AM  
 GFR est non-AA >60 05/06/2021 08:10 AM  
 Calcium 9.2 05/06/2021 08:10 AM  
 Bilirubin, total 0.5 05/06/2021 08:10 AM  
 Alk. phosphatase 167 (H) 05/06/2021 08:10 AM  
 Protein, total 6.6 05/06/2021 08:10 AM  
 Albumin 3.2 (L) 05/06/2021 08:10 AM  
 Globulin 3.4 05/06/2021 08:10 AM  
 A-G Ratio 0.9 (L) 05/06/2021 08:10 AM  
 ALT (SGPT) 19 05/06/2021 08:10 AM  
 
MRI Brain: (3/2/2021) IMPRESSION 1. Interval development of metastasis right cerebral hemisphere and left 
cerebellum. 2. Multifocal osseous metastatic disease similar to or possibly improved. 3. Stable nonspecific suspected chronic white matter small vessel ischemic 
change. Assessment: 1. 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 Blood counts are acceptable. Results reviewed with the patient. MRI brain (3/2/2021) shows metastasis 2. Bone metastasis Receiving zoledronic acid 3. Pedal edema - bilateral 
 
No pain or redness. Will decrease after elevation. 1+ 
Recommended compression stockings 4. Chemotherapy induced anemia - resolved Observation 5. Severe protein calorie malnutrition - improved Dietician consultation Protein supplements 6. Hypocalcemia Calcium OTC daily 7. Brain metastasis Following with Dr. Ana Schmitt. Originally planned for gamma knife. Re-imaging showed shrinkage of brain lesions and gamma knife was cancelled. Dr. Ana Schmitt will continue to monitor. Asymptomatic 8. Hyponatremia/SIADH Increase sodium intake Restrict fluid intake Plan:  
 
> Continue Lurbinectedin 
> Continue zometa every 3 weeks 
> Continue calcium daily 
> Repeat CT chest/abd/pelvis 
> Follow-up in 3 weeks I performed a history and physical examination of the patient and discussed his management with the NPP. I reviewed the NPP note and agree with the documented findings and plan of care. The patient was seen in conjunction with Ms. Anne-Marie. Ms. Mtahew Barragan is a women with extensive stage SCLC. She is receiving Lurbinectidin as second line treatment. She feels fair. Her physical exam is normal.  
 
Signed by: Jo Ann Manrique MD 
                   May 6, 2021 
 
 
CC. Ced Mcdonald MD 
CC.  Cyndy Oden MD

## 2021-05-11 NOTE — PROGRESS NOTES
Pt should get a bone scan as well for restaging purposes. PER FREDO FROM DR Baldo Rodrigues NM BONE SCAN [facility] BODY as a one time order.

## 2021-05-25 NOTE — PROGRESS NOTES
Paulino Thompson is a 66 y.o. female here for follow up for met lung cancer. Treatment today:  Cycle 5 Day 1 Lurbinectedin Receiving Zometa today. 1. Have you been to the ER, urgent care clinic since your last visit? Hospitalized since your last visit? no 
 
2. Have you seen or consulted any other health care providers outside of the [address] [place] [address] since your last visit? Include any pap smears or colon screening. Radiation yesterday Pt states she is hurting all over. She is having a hard time sleeping. She had radiation yesterday . Pt has lost 4 lbs since last visit. States she is eating ok.

## 2021-05-27 NOTE — PROGRESS NOTES
2001 [facility] 
at [facility] [address] [place] [address], [address], [address] 
[phone] Oncology Follow Up Note Patient: Kim Linda MRN: [id number]  SSN: [id number] YOB: 1950  Age: 66 y.o. Sex: female Diagnosis 1. 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 Palliative chemotherapy Carboplatin/etoposide/durvalumab - s/p 6 cycles Durvalumab monotherapy - s/p 4 cycles - last cycle 1/27/2021 Lubrinectidin - Cycle 5 Day 1 Subjective:  
  
Kim Linda is a 66 y.o. 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 She has been experiencing mild dyspnea for over 2 years. Occasional cough, no phlegm or hemoptysis. She has some pain and discomfort in the left chest wall and RUQ. The pain is not bad and she does not take medicines. She is also having insomnia. She works for [employer] [address] [place] [address]. She was seen in the ED. CT showed a mass in the LLL invading into the mediastinum and hepatic metastasis. CT guided bx established the diagnosis. Bone scan shows widespread bony metastasis. She received chemo-immuno therapy, benefited from it for some time. She then had progression of disease both in the brain and systemic compartment. She is now receiving Lurbinectidin. Per Dr. Emily Canales, the original 3 lesions had resolved, however she had 13 new lesions which she had gamma knife yesterday. She is complaining of back pain and is teary eyed this morning. She is on a medrol dose pack prescribed by Dr. Emily Canales. Review of Systems: 
 
Constitutional: fatigue Eyes: negative Ears, Nose, Mouth, Throat, and Face: negative Respiratory: negative Cardiovascular: negative Gastrointestinal: negative Genitourinary:negative Integument/Breast: negative Hematologic/Lymphatic: negative Musculoskeletal: back pain thoracic/ lumbar area Neurological: negative. ROS was pulled in from a previous visit. No changes were made d/t symptoms remain the same. Past Medical History:  
Diagnosis Date  CAD (coronary artery disease)  Cancer ([address] lung Past Surgical History:  
Procedure Laterality Date  HX HEART CATHETERIZATION    
 HX HYSTERECTOMY  IR BX TRANSCATHETER  2020  IR INSERT TUNL CVC W PORT OVER 5 YEARS  2020 Family History Problem Relation Age of Onset  Heart Disease Father  Cancer Sister Social History Tobacco Use  Smoking status: Former Smoker Quit date: 2020 Years since quittin.9  Smokeless tobacco: Never Used Substance Use Topics  Alcohol use: Never Prior to Admission medications Medication Sig Start Date End Date Taking? Authorizing Provider  
methylPREDNISolone (MEDROL) 4 mg tab Take  by mouth daily (with breakfast). Yes Provider, Historical  
traMADoL (ULTRAM) 50 mg tablet Take 1 Tablet by mouth every six (6) hours as needed for Pain for up to 5 days. Max Daily Amount: 200 mg. 21 Yes Courtney Xie NP  
losartan (COZAAR) 25 mg tablet Take  by mouth daily. Yes Provider, Historical  
atorvastatin (LIPITOR) 10 mg tablet Take  by mouth daily. Yes Provider, Historical  
lidocaine-prilocaine (EMLA) topical cream Apply  to affected area as needed for Pain. 20  Yes Don Lees MD  
prochlorperazine (COMPAZINE) 10 mg tablet Take 0.5 Tabs by mouth every six (6) hours as needed for Nausea. 20  Yes Don Lees MD  
budesonide/formoterol fumarate (SYMBICORT IN) Take  by inhalation. Yes Provider, Historical  
clopidogrel (PLAVIX) 75 mg tab Take 1 Tab by mouth daily. 19  Yes Barrington Birmingham MD  
aspirin delayed-release 81 mg tablet Take 81 mg by mouth daily.    Yes Provider, Historical  
cholecalciferol (VITAMIN D3) 1,000 unit cap Take  by mouth daily. Yes Provider, Historical  
calcium carbonate (TUMS) 200 mg calcium (500 mg) chew Take 1 Tab by mouth daily. Yes Provider, Historical  
  
 
 
Allergies Allergen Reactions  Codeine Itching Objective:  
 
Visit Vitals BP (!) 158/80 Pulse 98 Temp 98.4 °F (36.9 °C) Resp 16 Ht 5' 3\" (1.6 m) Wt 105 lb (47.6 kg) SpO2 97% BMI 18.60 kg/m² Pain Scale: 5/10 - back Physical Exam: 
 
GENERAL: alert, cooperative, no distress, appears stated age EYE: conjunctivae/corneas clear LYMPHATIC: Cervical, supraclavicular, and axillary nodes normal.  
THROAT & NECK: normal and no erythema or exudates noted. LUNG:  
HEART: regular rate and rhythm ABDOMEN: soft, non-tender. EXTREMITIES:  extremities normal, atraumatic, no cyanosis or edema SKIN: negative NEUROLOGIC: AOx3. Gait normal.  
 
 
Physical exam and ROS has been modified from a prior visit to make it relevant and current CT Results (most recent): 
Results from [facility] Encounter encounter on 05/19/21 CT ABD PELV W CONT Narrative INDICATION: Lung carcinoma COMPARISON: February 17, 2021 TECHNIQUE:  Following the uneventful intravenous administration of 100 cc Isovue-300, 5 mm axial images were obtained through the chest, abdomen and 
pelvis. Coronal and sagittal reformats were generated. CT dose reduction was 
achieved through use of a standardized protocol tailored for this examination 
and automatic exposure control for dose modulation. Oral Contrast was given FINDINGS: 
 
CHEST WALL: No mass or axillary lymphadenopathy. THYROID: No nodule. MEDIASTINUM: Tami: Significant decrease in size of mediastinal and hilar 
adenopathy. A left hilar nodes measures 49 x 23 mm, it is 18 x 6. Infracarinal 
mediastinal node measures 8 mm on the current examination it was 15. No new 
adenopathy. . Changes of COPD once again seen.  
THORACIC AORTA: No dissection or aneurysm. MAIN PULMONARY ARTERY: Normal in caliber. TRACHEA/BRONCHI: Patent. ESOPHAGUS: No wall thickening or dilatation. HEART: Normal in size. PLEURA: No effusion or pneumothorax. LUNGS: A left lower lobe mass measured 10 mm on the prior examination it is 6 on 
the current. Small amount of residual associated pneumonitis once again seen. No 
new lung nodule. A small peripheral left upper lobe infiltrate is minimally new. Liver metastases have decreased in size, a right lobe of the liver metastases 
measured 24 mm on the prior examination, it is 11 on the current, Seri 2 image 58. Spleen remain normal in size, Adrenals appear unremarkable. There is no adenopathy in the abdomen or pelvis. Kidneys are unobstructed. Mild fecal stasis, no free air no free fluid. No bowel obstruction or wall 
thickening. The bladder is not filled and cannot be evaluated. Bone metastases appear stable. Next Impression 1. Significant improvement in mediastinal, monica adenopathy and in lung masses. 2. Significant improvement in liver disease with decreased size of metastases. 3. Stable bone metastases. Lab Results Component Value Date/Time WBC 9.0 05/27/2021 08:06 AM  
 HGB 10.0 (L) 05/27/2021 08:06 AM  
 Hematocrit (POC) 37 03/25/2021 08:13 AM  
 HCT 28.8 (L) 05/27/2021 08:06 AM  
 PLATELET 69 (L) 75/84/4982 08:06 AM  
 MCV 93.8 05/27/2021 08:06 AM  
 
 
Lab Results Component Value Date/Time Sodium 134 (L) 05/27/2021 08:06 AM  
 Potassium 3.3 (L) 05/27/2021 08:06 AM  
 Chloride 103 05/27/2021 08:06 AM  
 CO2 26 05/27/2021 08:06 AM  
 Anion gap 5 05/27/2021 08:06 AM  
 Glucose 97 05/27/2021 08:06 AM  
 BUN 22 (H) 05/27/2021 08:06 AM  
 Creatinine 0.65 05/27/2021 08:06 AM  
 BUN/Creatinine ratio 34 (H) 05/27/2021 08:06 AM  
 GFR est AA >60 05/27/2021 08:06 AM  
 GFR est non-AA >60 05/27/2021 08:06 AM  
 Calcium 8.7 05/27/2021 08:06 AM  
 Bilirubin, total 0.6 05/27/2021 08:06 AM  
 Alk. phosphatase 124 (H) 05/27/2021 08:06 AM  
 Protein, total 6.9 05/27/2021 08:06 AM  
 Albumin 3.3 (L) 05/27/2021 08:06 AM  
 Globulin 3.6 05/27/2021 08:06 AM  
 A-G Ratio 0.9 (L) 05/27/2021 08:06 AM  
 ALT (SGPT) 25 05/27/2021 08:06 AM  
 
MRI Brain: (3/2/2021) IMPRESSION 1. Interval development of metastasis right cerebral hemisphere and left 
cerebellum. 2. Multifocal osseous metastatic disease similar to or possibly improved. 3. Stable nonspecific suspected chronic white matter small vessel ischemic 
change. Assessment: 1. 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 Blood counts are NOT acceptable. Results reviewed with the patient. MRI brain (3/2/2021) shows metastasis CT chest/Abd/pelvis (5/19/2021) - significant improvement in mediastinal, hilar adenopathy and lung masses NM bone scan 5/19/2021 - stable skeletal metastases 2. Bone metastasis Receiving zoledronic acid 3. Pedal edema - bilateral 
 
No pain or redness. Will decrease after elevation. 1+ 
Recommended compression stockings 4. Chemotherapy induced anemia Observation 5. Severe protein calorie malnutrition - improved Dietician consultation Protein supplements 6. Hypocalcemia Calcium OTC daily 7. Brain metastasis Following with Dr. Boone Alvarez. Originally planned for gamma knife. Re-imaging showed shrinkage of brain lesions and gamma knife was cancelled.  She had a follow up with Dr. Boone Alvarez after repeat MRI, this showed 13 new lesions which she had gamma knife yesterday. She is on a medrol dose pack 8. Hyponatremia/SIADH - improving Increase sodium intake Restrict fluid intake 9. Thrombcytopenia From chemotherapy Hold treatment today Plan:  
 
> Continue Lurbinectedin 
> Continue zometa every 3 weeks 
> Continue calcium daily 
> Follow-up in 3 weeks I performed a history and physical examination of the patient and discussed his management with the NPP. I reviewed the NPP note and agree with the documented findings and plan of care. The patient was seen in conjunction with Ms. Ibrahimny. Ms. Katiuska Spear is a women with extensive stage SCLC. She is on second line therapy Lurbinectidin. She has had a good systemic response but progression in the brain. She underwent gamma knife treatment to the brain lesions. She will see Dr. Mariann Preciado for symptom management. I am delaying therapy by at least 1 week due to thrombocytopenia. Signed by: Daniel Roblero MD 
                   May 27, 2021 
 
 
 
CC. Robert Mena MD 
CC.  Nai Koch MD

## 2021-05-27 NOTE — PROGRESS NOTES
[facility] Progress Note Date: May 27, 2021 Name: Cruz Santamaria MRN: [id number] : 1950 Ms. Shari Panchal arrived ambulatory at 0800 and in no distress for C5 Zepzelca+Zometa. Assessment was completed, no acute issues at this time, no new complaints voiced. Covid Screening 1. Do you have any symptoms of COVID-19? SOB, coughing, fever, or generally not feeling well ? no 
2. Have you been exposed to COVID-19 recently? no 
3. Have you had any recent contact with family/friend that has a pending COVID test? no 
 
Venous Access Device [employer] Power[employer] lot [id number] 20 Upper chest (subclavicular area, right (Active) Central Line Being Utilized Yes 21 08 Criteria for Appropriate Use Irritant/vesicant medication 21 0800 Site Assessment Clean, dry, & intact 21 08 Date of Last Dressing Change 21 0800 Dressing Status New 21 0800 Dressing Type Transparent 21 08 Action Taken Blood drawn 21 0800 Date Accessed (Medial Site) 21 0800 Access Time (Medial Site) 0815 21 0800 Access Needle Size (Site #1) 20 G 21 0800 Access Needle Length (Medial Site) 0.75 inches 21 0800 Positive Blood Return (Medial Site) Yes 21 08 Action Taken (Medial Site) Blood drawn;Flushed 21 0800 Alcohol Cap Used Yes 21 0800  
 
 + blood return noted, labs drawn and sent. Proceeded to appt with Dr. Ms. Liberty Pinto vitals were reviewed. Patient Vitals for the past 12 hrs: 
 Temp Pulse Resp BP SpO2  
21 1048  (!) 52 16 (!) 158/82   
21 0803 98.4 °F (36.9 °C) 98 16 (!) 158/80 97 % Lab results were obtained and reviewed. Recent Results (from the past 12 hour(s)) CBC WITH AUTOMATED DIFF Collection Time: 21  8:06 AM  
Result Value Ref Range WBC 9.0 3.6 - 11.0 K/uL  
 RBC 3.07 (L) 3.80 - 5.20 M/uL  
 HGB 10.0 (L) 11.5 - 16.0 g/dL HCT 28.8 (L) 35.0 - 47.0 %  MCV 93.8 80.0 - 99.0 FL  
 MCH 32.6 26.0 - 34.0 PG  
 MCHC 34.7 30.0 - 36.5 g/dL  
 RDW 18.2 (H) 11.5 - 14.5 % PLATELET 69 (L) 239 - 400 K/uL MPV 10.8 8.9 - 12.9 FL  
 NRBC 0.4 (H) 0  WBC ABSOLUTE NRBC 0.04 (H) 0.00 - 0.01 K/uL NEUTROPHILS 81 (H) 32 - 75 % BAND NEUTROPHILS 5 % LYMPHOCYTES 4 (L) 12 - 49 % MONOCYTES 3 (L) 5 - 13 % EOSINOPHILS 0 0 - 7 % BASOPHILS 1 0 - 1 % METAMYELOCYTES 4 % MYELOCYTES 2 % IMMATURE GRANULOCYTES 0 0.0 - 0.5 % ABS. NEUTROPHILS 7.7 1.8 - 8.0 K/UL  
 ABS. LYMPHOCYTES 0.4 (L) 0.8 - 3.5 K/UL  
 ABS. MONOCYTES 0.3 0.0 - 1.0 K/UL  
 ABS. EOSINOPHILS 0.0 0.0 - 0.4 K/UL  
 ABS. BASOPHILS 0.1 0.0 - 0.1 K/UL  
 ABS. IMM. GRANS. 0.0 0.00 - 0.04 K/UL  
 DF MANUAL    
 RBC COMMENTS ANISOCYTOSIS 
1+ METABOLIC PANEL, COMPREHENSIVE Collection Time: 05/27/21  8:06 AM  
Result Value Ref Range Sodium 134 (L) 136 - 145 mmol/L Potassium 3.3 (L) 3.5 - 5.1 mmol/L Chloride 103 97 - 108 mmol/L  
 CO2 26 21 - 32 mmol/L Anion gap 5 5 - 15 mmol/L Glucose 97 65 - 100 mg/dL BUN 22 (H) 6 - 20 MG/DL Creatinine 0.65 0.55 - 1.02 MG/DL  
 BUN/Creatinine ratio 34 (H) 12 - 20 GFR est AA >60 >60 ml/min/1.73m2 GFR est non-AA >60 >60 ml/min/1.73m2 Calcium 8.7 8.5 - 10.1 MG/DL Bilirubin, total 0.6 0.2 - 1.0 MG/DL  
 ALT (SGPT) 25 12 - 78 U/L  
 AST (SGOT) 43 (H) 15 - 37 U/L Alk. phosphatase 124 (H) 45 - 117 U/L Protein, total 6.9 6.4 - 8.2 g/dL Albumin 3.3 (L) 3.5 - 5.0 g/dL Globulin 3.6 2.0 - 4.0 g/dL A-G Ratio 0.9 (L) 1.1 - 2.2 Patient Platelets 67; MD notified and treatment HELD for one week; Zometa to be given. Patient made aware of changes and office will schedule. Pre-medications  were administered as ordered and chemotherapy was initiated. Medication: 
Medications Administered 0.9% sodium chloride infusion Admin Date 
05/27/2021 Action New Bag Dose 25 mL/hr Rate 25 mL/hr Route IntraVENous Administered By 
Omaira Hernandezn A  
  
  
 0.9% sodium chloride injection 10 mL Admin Date 
05/27/2021 Action Given Dose 
10 mL Route IntraVENous Administered By 
Omaira GLASGOW  
  
  
 sodium chloride (NS) flush 10 mL Admin Date 
05/27/2021 Action Given Dose 
10 mL Route IntraVENous Administered By 
Omaira Herman A  
  
  
 zoledronic acid (ZOMETA) 3.5 mg in 0.9% sodium chloride 100 mL IVPB Admin Date 
05/27/2021 Action New Bag Dose 3.5 mg Route IntraVENous Administered By 
[facility] Patient port flushed; heparinized; and de-accessed per protocol. Ms. Ysabel Peng tolerated treatment well and was discharged from [place] [address] in stable condition at 1055. She is aware of when to return for her next appointment. Patient appointment to be adjusted to 06/03/2021. Future Appointments Date Time Provider Abbie Rosario 6/17/2021  [facility] REG  
6/17/2021  8:45 AM Tahmina Nix NP ONC BS AMB  
7/8/2021  8:00 AM CHAIR 2 [facility] REG  
7/29/2021  8:00 AM [place] INFUSION NURSE 1 [facility] REG Josephine Coffman May 27, 2021

## 2021-05-27 NOTE — PATIENT INSTRUCTIONS
Try benadryl 25 mg at night for sleep. Melatonin 3 or 5 mg first - over the counter Order MRI of the spine Tramadol sent to pharmacy. Try this first and if this does not work, we will challenge the codeine allergy. Referral to palliative

## 2021-05-27 NOTE — LETTER
5/27/2021 Patient: Kim Linda YOB: 1950 Date of Visit: 5/27/2021 Katie [phone] Via Fax: [phone] Dear Deysi Alexis MD, Thank you for referring Ms. Rolanda Carter to [address] [place] [address] for evaluation. My notes for this consultation are attached. If you have questions, please do not hesitate to call me. I look forward to following your patient along with you.  
 
 
Sincerely, 
 
Ginny Faria NP

## 2021-05-28 NOTE — TELEPHONE ENCOUNTER
Palliative Medicine  Nursing Note  [phone]  Fax [phone]      Telephone Call  Patient Name: Paulino Thompson  YOB: 1950    5/28/2021         Advance Care Planning 5/27/2021   Patient's Healthcare Decision Maker is: Legal Next of Kin   Confirm Advance Directive None   Patient Would Like to Complete Advance Directive No       Received outpatient Palliative Medicine referral from Dr. Malachi Mitchell to see patient for symptom management and supportive care. Chart  reviewed. Paulino Thompson is a 66 y.o. 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 Pt's most recent office visit with Dr. Malcahi Mitchell was 5/27/21. See Office Visit note for complete HPI, treatment history, and plan. ACP: Not on file                                                                                                                                  Nurse called to introduced palliative medicine and schedule NP appointment.  [facility] for return call    Information shared with Dr. Court Curling, RN  Palliative Medicine

## 2021-06-03 NOTE — TELEPHONE ENCOUNTER
Returned call to Ms. Robertson and schedule new patient appointment for Monday, 6/14/21 @ [facility]

## 2021-06-03 NOTE — DISCHARGE INSTRUCTIONS
Patient Education        Thrombocytopenia: Care Instructions  Your Care Instructions     Thrombocytopenia is a low number of platelets in the blood. Platelets are the cells that help blood clot. If you don't have enough of them, your blood cannot clot well. So it is harder to stop bleeding. You may have low platelets because your bone marrow does not make them. Or your body's defenses (immune system) may destroy them. Having an enlarged spleen can also reduce the number of platelets in your blood. This is because they can get trapped in the enlarged spleen. Some diseases or medicines may also cause low platelets. But platelets may go back to normal levels if the disease is treated or the medicine is stopped. You may not need treatment if your problem is mild. If you do need treatment, you may have platelets added to your blood. Or you may get medicine to stop the loss of platelets or help your body make them. Follow-up care is a key part of your treatment and safety. Be sure to make and go to all appointments, and call your doctor if you are having problems. It's also a good idea to know your test results and keep a list of the medicines you take. How can you care for yourself at home? · Be safe with medicines. Take your medicines exactly as prescribed. Call your doctor if you think you are having a problem with your medicine. · Do not take aspirin or anti-inflammatory medicines unless your doctor says it is okay. Examples are ibuprofen (Advil, Motrin) and naproxen (Aleve). They may increase the risk of bleeding. · Avoid contact sports or activities that could cause you to fall. When should you call for help? Call 911 anytime you think you may need emergency care. For example, call if:    · You passed out (lost consciousness).     · You have signs of severe bleeding, which includes:  ? You have a severe headache that is different from past headaches.   ? You vomit blood or what looks like coffee grounds. ? Your stools are maroon or very bloody. Call your doctor now or seek immediate medical care if:    · You are dizzy or lightheaded, or you feel like you may faint.     · You have abnormal bleeding, such as:  ? A nosebleed that you can't easily stop. This means it's still bleeding after pressure has been applied 3 times for 10 minutes each time (30 minutes total). ? Your stools are black and look like tar, or they have streaks of blood. ? You have blood in your urine. ? You have joint pain. ? You have bruises or blood spots under your skin. Watch closely for changes in your health, and be sure to contact your doctor if:    · You do not get better as expected. Where can you learn more? Go to http://www.gray.com/  Enter N724 in the search box to learn more about \"Thrombocytopenia: Care Instructions. \"  Current as of: September 23, 2020               Content Version: 12.8  © 2006-2021 [employer]. Care instructions adapted under license by [employer] (which disclaims liability or warranty for this information). If you have questions about a medical condition or this instruction, always ask your healthcare professional. [place] [address] any warranty or liability for your use of this information.

## 2021-06-03 NOTE — PROGRESS NOTES
[facility] Progress Note    Date: Elizabeth 3, 2021    Name: Venkata Zhong    MRN: [id number]         : 1950    Ms. Robertson Arrived ambulatory and in no distress for cycle 5 day 1 of Zepzelca regimen. [facility] COVID-19 SCREENING      The patient was asked the following questions and answered as documented below:    1. Do you have any symptoms of COVID-19? SOB, coughing, fever, or generally not feeling well? NO  2. Have you been exposed to COVID-19 recently? NO  3. Have you had any recent contact with family/friend that has a pending COVID test? NO      Follow Up: Proceed with treatment    Assessment was completed, no acute issues at this time, no new complaints voiced. Port accessed without difficulty, labs drawn and processed. Problem: Chemotherapy Treatment  Goal: *Chemotherapy regimen followed  Outcome: Progressing Towards Goal  Goal: *Hemodynamically stable  Outcome: Progressing Towards Goal  Goal: *Tolerating diet  Outcome: Progressing Towards Goal     Problem: Infection - Risk of, Central Venous Catheter-Associated Bloodstream Infection  Goal: *Absence of infection signs and symptoms  Outcome: Progressing Towards Goal     Problem: Patient Education:  Go to Education Activity  Goal: Patient/Family Education  Outcome: Progressing Towards Goal    Chemotherapy Flowsheet 6/3/2021   Cycle C5D1   Date 6/3/2021   Drug / Regimen Majel Linder -   Notes Chemo HELD          Ms. Robertson's vitals were reviewed.   Patient Vitals for the past 12 hrs:   Temp Pulse Resp BP   21 0938 98 °F (36.7 °C) 84 18 (!) 170/66       Lines:   Venous Access Device [employer] lot [id number] 20 Upper chest (subclavicular area, right (Active)   Central Line Being Utilized Yes 21 0947   Criteria for Appropriate Use Irritant/vesicant medication 21 0947   Site Assessment Clean, dry, & intact 21 0947   Date of Last Dressing Change 21 0947   Dressing Status Clean, dry, & intact;New;Occlusive 06/03/21 0947   Dressing Type Transparent 06/03/21 0947   Action Taken Blood drawn 06/03/21 0947   Date Accessed (Medial Site) 06/03/21 06/03/21 0947   Access Time (Medial Site) 0948 06/03/21 0947   Access Needle Size (Site #1) 20 G 06/03/21 0947   Access Needle Length (Medial Site) 0.75 inches 06/03/21 0947   Positive Blood Return (Medial Site) Yes 06/03/21 1051   Action Taken (Medial Site) Flushed; De-accessed 06/03/21 1051     Lab results were obtained and reviewed. Labs NOT within parameter for treatment. Recent Results (from the past 12 hour(s))   METABOLIC PANEL, COMPREHENSIVE    Collection Time: 06/03/21  9:43 AM   Result Value Ref Range    Sodium 128 (L) 136 - 145 mmol/L    Potassium 4.0 3.5 - 5.1 mmol/L    Chloride 92 (L) 97 - 108 mmol/L    CO2 25 21 - 32 mmol/L    Anion gap 11 5 - 15 mmol/L    Glucose 100 65 - 100 mg/dL    BUN 27 (H) 6 - 20 MG/DL    Creatinine 0.54 (L) 0.55 - 1.02 MG/DL    BUN/Creatinine ratio 50 (H) 12 - 20      GFR est AA >60 >60 ml/min/1.73m2    GFR est non-AA >60 >60 ml/min/1.73m2    Calcium 8.3 (L) 8.5 - 10.1 MG/DL    Bilirubin, total 0.9 0.2 - 1.0 MG/DL    ALT (SGPT) 40 12 - 78 U/L    AST (SGOT) 67 (H) 15 - 37 U/L    Alk.  phosphatase 128 (H) 45 - 117 U/L    Protein, total 6.6 6.4 - 8.2 g/dL    Albumin 3.3 (L) 3.5 - 5.0 g/dL    Globulin 3.3 2.0 - 4.0 g/dL    A-G Ratio 1.0 (L) 1.1 - 2.2     CBC WITH AUTOMATED DIFF    Collection Time: 06/03/21  9:43 AM   Result Value Ref Range    WBC 14.8 (H) 3.6 - 11.0 K/uL    RBC 3.13 (L) 3.80 - 5.20 M/uL    HGB 10.1 (L) 11.5 - 16.0 g/dL    HCT 28.1 (L) 35.0 - 47.0 %    MCV 89.8 80.0 - 99.0 FL    MCH 32.3 26.0 - 34.0 PG    MCHC 35.9 30.0 - 36.5 g/dL    RDW 17.9 (H) 11.5 - 14.5 %    PLATELET 10 (LL) 506 - 400 K/uL    MPV ABNORMAL 8.9 - 12.9 FL    NRBC 0.7 (H) 0  WBC    ABSOLUTE NRBC 0.11 (H) 0.00 - 0.01 K/uL    NEUTROPHILS 77 (H) 32 - 75 %    BAND NEUTROPHILS 6 0 - 6 %    LYMPHOCYTES 10 (L) 12 - 49 %    MONOCYTES 3 (L) 5 - 13 %    EOSINOPHILS 0 0 - 7 %    BASOPHILS 0 0 - 1 %    METAMYELOCYTES 3 (H) 0 %    MYELOCYTES 1 (H) 0 %    IMMATURE GRANULOCYTES 0 %    ABS. NEUTROPHILS 12.3 (H) 1.8 - 8.0 K/UL    ABS. LYMPHOCYTES 1.5 0.8 - 3.5 K/UL    ABS. MONOCYTES 0.4 0.0 - 1.0 K/UL    ABS. EOSINOPHILS 0.0 0.0 - 0.4 K/UL    ABS. BASOPHILS 0.0 0.0 - 0.1 K/UL    ABS. IMM. GRANS. 0.0 K/UL    DF MANUAL      RBC COMMENTS ANISOCYTOSIS  1+        WBC COMMENTS Pathology Review Requested         Pre-medications  were administered as ordered and chemotherapy was initiated. Medications Administered     heparin (porcine) pf 300-500 Units     Admin Date  06/03/2021 Action  Given Dose  300 Units Route  InterCATHeter Administered By  Maura Jeff RN          sodium chloride (NS) flush 10 mL     Admin Date  06/03/2021 Action  Given Dose  10 mL Route  IntraVENous Administered By  Maura Jeff RN              MD notified of PLT level of 10: Treatment HELD today. Patient to return to [facility] on Monday for repeat labs. Ms. Brian Cotto tolerated treatment well, port flushed and de accessed, patient was discharged from [place] [address] in stable condition at 1100.       Patient provided with AVS , which includes future appointment and written educational material.     Future Appointments   Date Time Provider Abbie Rosario   6/6/2021  9:00 AM [facility] MRI 2 [facility]   6/6/2021 10:00 AM [facility] 2 [facility]   6/6/2021 11:00 AM [facility] 2 [facility]   6/24/2021  8:00 AM [facility] CHAIR 2 [facility]   6/24/2021  8:30 AM Mildred Rogers NP ONCMR BS AMB   7/15/2021  8:00 AM [facility]  CHAIR 1 [facility]   8/5/2021  8:00 AM CHAIR 2 [facility] [id number] [place] Thierry Sharma RN  Elizabeth 3, 2021

## 2021-06-03 NOTE — PROGRESS NOTES
Pt plt's are 10. Per  we should recheck labs on Monday and hold treatment today. Pt reports back pain but she is scheduled for MRI Sunday. She has some blurry vision but only noticed it since started pain medication, which is tramadol. This RN asked Tim Amato RN to have her reach out to [facility] about the blurry vision.

## 2021-06-03 NOTE — TELEPHONE ENCOUNTER
Ms. Shayy Graves is returning Anastasia's call regarding appointment. Advised that Travis Mcdowell would call her back to schedule since she schedules all new patient's for Dr. Dante Goldmann.  Ms. Shayy Graves states she will be at the [phone] number all afternoon.

## 2021-06-04 PROBLEM — R79.89 ABNORMAL CBC: Status: ACTIVE | Noted: 2021-01-01

## 2021-06-04 PROBLEM — D69.6 THROMBOCYTOPENIA (HCC): Status: ACTIVE | Noted: 2021-01-01

## 2021-06-07 NOTE — PROGRESS NOTES
[facility] Lab Note 1030 Pt arrived at [facility] ambulatory and in no distress for labs via port. No new complaints voiced. Port accessed per protocol. Labs drawn. Port flushed and capped. Pt to remain in waiting room until lab results. Patient Vitals for the past 12 hrs: 
 Pulse Resp BP  
06/07/21 1030 92 16 (!) 159/75 Recent Results (from the past 12 hour(s)) CBC WITH AUTOMATED DIFF Collection Time: 06/07/21 10:38 AM  
Result Value Ref Range WBC 10.2 3.6 - 11.0 K/uL  
 RBC 2.75 (L) 3.80 - 5.20 M/uL HGB 8.7 (L) 11.5 - 16.0 g/dL HCT 25.5 (L) 35.0 - 47.0 % MCV 92.7 80.0 - 99.0 FL  
 MCH 31.6 26.0 - 34.0 PG  
 MCHC 34.1 30.0 - 36.5 g/dL  
 RDW 18.6 (H) 11.5 - 14.5 % PLATELET 4 (LL) 391 - 400 K/uL MPV ABNORMAL 8.9 - 12.9 FL  
 NRBC 1.9 (H) 0  WBC ABSOLUTE NRBC 0.19 (H) 0.00 - 0.01 K/uL NEUTROPHILS 72 32 - 75 % BAND NEUTROPHILS 7 % LYMPHOCYTES 8 (L) 12 - 49 % MONOCYTES 7 5 - 13 % EOSINOPHILS 0 0 - 7 % BASOPHILS 0 0 - 1 % METAMYELOCYTES 5 % MYELOCYTES 1 % IMMATURE GRANULOCYTES 0 0.0 - 0.5 % ABS. NEUTROPHILS 8.1 (H) 1.8 - 8.0 K/UL  
 ABS. LYMPHOCYTES 0.8 0.8 - 3.5 K/UL  
 ABS. MONOCYTES 0.7 0.0 - 1.0 K/UL  
 ABS. EOSINOPHILS 0.0 0.0 - 0.4 K/UL  
 ABS. BASOPHILS 0.0 0.0 - 0.1 K/UL  
 ABS. IMM. GRANS. 0.0 0.00 - 0.04 K/UL  
 DF MANUAL PLATELET COMMENTS DECREASED PLATELETS    
 RBC COMMENTS ANISOCYTOSIS 1+ 
    
 RBC COMMENTS POLYCHROMASIA PRESENT 
    
BLOOD TYPE, (ABO+RH) Collection Time: 06/07/21 10:38 AM  
Result Value Ref Range ABO/Rh(D) AB POSITIVE PLATELETS, ALLOCATE Collection Time: 06/07/21  1:15 PM  
Result Value Ref Range Unit number [id number] Blood component type PLP,LR PSTC2 Unit division 00 Status of unit ALLOCATED Medications received: 
Medications Administered 0.9% sodium chloride injection 10 mL Admin Date 
06/07/2021 Action Given Dose 
10 mL Route IntraVENous Administered By 
Migue Portillo RN  
  
  
 heparin (porcine) pf 500 Units Admin Date 
06/07/2021 Action Given Dose 
500 Units Route IntraVENous Administered By 
Migue Portillo RN  
  
  
  
 
 
2598 Tolerated treatment well, no adverse reaction noted. D/Cd from [facility] to waiting area. 1200 Labs resulted. Plt 4. NP notified. Pt to be transfused tomorrow at 0800.

## 2021-06-08 NOTE — DISCHARGE INSTRUCTIONS
OUTPATIENT INFUSION CENTER    DISCHARGE INSTRUCTIONS FOR:  BLOOD TRANSFUSION    We hope you are feeling better after your blood transfusion. Some mild tenderness or slight bruising at your IV site is normal.  Avoid lifting or heavy use of that extremity for the rest of the day. Drink plenty of fluids, eat a normal diet and get some rest.    There are some important signs that you need to watch for in case you experience a delayed reaction to the blood you have received. Call your physician immediately if you develop any of the following symptoms:    1. Severe headache or backache;    2. Fever above 100 degrees;    3. Chills;    4. Difficulty breathing;    5.  Blood or red color in urine;    6. The feeling of weakness or constant fatigue;    7. Yellowing of the whites of your eyes or skin (jaundice). If your physician is not available, call or go to the nearest emergency room, or dial 911.     Darby Cantrell, Signature: ___________________________ 6/8/2021  Jesusita Hsieh RN

## 2021-06-08 NOTE — PROGRESS NOTES
Pt arrived at [facility] at [address] and in no distress for transfusion of 1 unit of platelets. Assessment completed, no new complaints voiced. R chest port accessed from lab draw yesterday. Positive blood return noted. Signs/symptoms of adverse blood reaction discussed with pt, voiced understanding. Patient denied having any symptoms of COVID-19, i.e. SOB, coughing, fever, or generally not feeling well. Also denies having been exposed to COVID-19 recently or having had any recent contact with family/friend that has a pending COVID test. 
 
Patient Vitals for the past 12 hrs: 
 Temp Pulse Resp BP SpO2  
06/08/21 0949 98.1 °F (36.7 °C) 95 16 (!) 157/77 98 % 06/08/21 0850 98.8 °F (37.1 °C) 90 18 (!) 143/71 98 % 06/08/21 0834 98.7 °F (37.1 °C) 89 18 (!) 144/73 98 % 06/08/21 0813 98.8 °F (37.1 °C) 98 18 129/72 94 % Medications received:  
NS @ [facility] Tylenol 650 mg PO Benadryl 25 mg PO 
0835: 1st unit platelets started and infusing without difficulty, will monitor. 0900: 1st unit completed without adverse reaction noted, NS flushing line. Tolerated transfusion well, no adverse reaction noted. D/C instructions reviewed, copy to pt, voiced understanding. Patient stayed for 1 hour observation. Port flushed, heparinized, and de-accessed per protocol. D/Cd from [facility] 1000 and in no distress accompanied by self. Next appt 06/14/2021.

## 2021-06-08 NOTE — PROGRESS NOTES
[address] [facility] [address], [address] [facility], [address] [place] [address], [address] [place] [address]  [phone]      I have discussed with the patient the rationale for blood transfusion, its benefits in treating or preventing low platelets which could lead to fatigue, organ damage or death, and its risk which include: mild transfusion reactions, rare risk of blood borne infection, or more serious but rare allergic reactions. I have discussed the alternatives to transfusion, including the risk and consequences of not receiving transfusion. The patient had an opportunity to ask questions and had agreed to proceed with transfusion of platelets .     Heydi Rinaldi, [address] [place] [address] Oncology  [phone]

## 2021-06-14 ENCOUNTER — DOCUMENTATION ONLY (OUTPATIENT)
Dept: ONCOLOGY | Age: 71
End: 2021-06-14

## 2021-06-14 NOTE — PROGRESS NOTES
Called pt at 0835 this morning to inquire on coming in to discuss plan of care, low platelets etc.    Pt  answered the phone and said \" I am sorry but my wife passed away last night. \"    Expressed my condolences.

## 2021-06-17 ENCOUNTER — APPOINTMENT (OUTPATIENT)
Dept: INFUSION THERAPY | Age: 71
End: 2021-06-17

## 2021-06-24 ENCOUNTER — APPOINTMENT (OUTPATIENT)
Dept: INFUSION THERAPY | Age: 71
End: 2021-06-24

## 2021-07-08 ENCOUNTER — APPOINTMENT (OUTPATIENT)
Dept: INFUSION THERAPY | Age: 71
End: 2021-07-08

## 2021-07-15 ENCOUNTER — APPOINTMENT (OUTPATIENT)
Dept: INFUSION THERAPY | Age: 71
End: 2021-07-15

## 2021-07-29 ENCOUNTER — APPOINTMENT (OUTPATIENT)
Dept: INFUSION THERAPY | Age: 71
End: 2021-07-29

## 2021-08-05 ENCOUNTER — APPOINTMENT (OUTPATIENT)
Dept: INFUSION THERAPY | Age: 71
End: 2021-08-05

## 2022-02-05 NOTE — DISCHARGE INSTRUCTIONS
[facility].  Radiology Department  [phone]      Radiologist:  Dr. Dusty Torrez    Date: 6/22/20      Liver Biopsy Discharge Instructions      You may have an aching pain in the biopsy site tonight. Take Tylenol if allowed, as directed on the label, for pain or discomfort. Avoid ibuprofen (Advil, Motrin) and aspirin for the next 48 hours as these drugs may increase your risk of bleeding. Resume your previous diet and resume your prescribed medications. You have been given sedating medications today. Go home and rest.  Do not drive or make any important decisions. Avoid strenuous activity,  do not lift anything heavier than a small grocery bag (10 pounds) and avoid excessive twisting and reaching for the next 5 days. If you experience severe sweating, severe abdominal pain, dizziness or faintness, go to the nearest Emergency Room immediately. Pain under the left collar bone is normal.    Watch for signs of infection at biopsy site:  redness, pain, drainage, fever chills. If this occurs, call you doctor. Follow up with your ordering physician as previously discussed to receive results. If you have any questions or concerns, please call [phone] and ask to speak to a radiology nurse. [facility] 76.  Angiography Department      Radiologist:   Dr. Dusty Torrez        Date:   6/22/20      Portacath Discharge Instructions      Watch for signs of infection:    1. Redness,   2. Fever, chills,   3. Increased pain, and/or drainage from the site. If this occurs, call your physician at once. Return next week for a [employer] check:       Do not register. Proceed to the Radiology Waiting Area and let the  know you are here for a \"PORT site check\". If you have an appointment with a provider or at the infusion center in the upcoming week,  they may check your site and change the dressing for you. Keep your dressing clean and dry. Leave the dressing in place until seen here next week. Continue your previous diet and restart your regularly prescribed medications. You may take Tylenol, as directed on the label, for pain if needed. Avoid ibuprofen (Advil, Motrin) and aspirin as they may cause you to bleed. Because you received sedation, you are not to drive or sign any legal documents for the next 24 hours. Do not lift anything heavier than 5 pounds with the affected arm and avoid pushing and pulling movements for several days. If you have any questions or concerns, please call our radiology department at [phone]. [phone]
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
## 2024-10-17 NOTE — LETTER
9/23/20 Patient: Sallyanne Klinefelter YOB: 1950 Date of Visit: 9/23/2020 Katie [address] [id number] VIA Facsimile: [phone] Dear Renee Calle MD, Thank you for referring Ms. Paul Jorge to [address] [place] [address] for evaluation. My notes for this consultation are attached. If you have questions, please do not hesitate to call me. I look forward to following your patient along with you.  
 
 
Sincerely, 
 
Mariann Khan NP 
 

Body Location Override (Optional - Billing Will Still Be Based On Selected Body Map Location If Applicable): right upper back
Detail Level: Simple
Depth Of Biopsy: dermis
Was A Bandage Applied: Yes
Size Of Lesion In Cm: 0
Biopsy Type: H and E
Biopsy Method: Dermablade
Anesthesia Type: 1% lidocaine with epinephrine
Anesthesia Volume In Cc: 1.5
Hemostasis: Drysol
Wound Care: Petrolatum
Dressing: bandage
Destruction After The Procedure: No
Type Of Destruction Used: Curettage
Curettage Text: The wound bed was treated with curettage after the biopsy was performed.
Cryotherapy Text: The wound bed was treated with cryotherapy after the biopsy was performed.
Electrodesiccation Text: The wound bed was treated with electrodesiccation after the biopsy was performed.
Electrodesiccation And Curettage Text: The wound bed was treated with electrodesiccation and curettage after the biopsy was performed.
Silver Nitrate Text: The wound bed was treated with silver nitrate after the biopsy was performed.
Lab: -[id number]
Lab Facility: [id number]
Consent: Written consent was obtained and risks were reviewed including but not limited to scarring, infection, bleeding, scabbing, incomplete removal, nerve damage and allergy to anesthesia.
Post-Care Instructions: I reviewed with the patient in detail post-care instructions. Patient is to keep the biopsy site dry overnight, and then apply bacitracin twice daily until healed. Patient may apply hydrogen peroxide soaks to remove any crusting.
Notification Instructions: Patient will be notified of biopsy results. However, patient instructed to call the office if not contacted within 2 weeks.
Billing Type: Third-Party Bill
Information: Selecting Yes will display possible errors in your note based on the variables you have selected. This validation is only offered as a suggestion for you. PLEASE NOTE THAT THE VALIDATION TEXT WILL BE REMOVED WHEN YOU FINALIZE YOUR NOTE. IF YOU WANT TO FAX A PRELIMINARY NOTE YOU WILL NEED TO TOGGLE THIS TO 'NO' IF YOU DO NOT WANT IT IN YOUR FAXED NOTE.
Body Location Override (Optional - Billing Will Still Be Based On Selected Body Map Location If Applicable): left postauricular neck

## (undated) DEVICE — PINNACLE INTRODUCER SHEATH: Brand: PINNACLE

## (undated) DEVICE — CATHETER BLLN  SPRINTER 138CM 12MM DIA1.5MM CROSSING PROF

## (undated) DEVICE — KIT ACCS INTRO 4FR L10CM NDL 21GA L7CM GWIRE L40CM

## (undated) DEVICE — ANGIO-SEAL VIP VASCULAR CLOSURE DEVICE: Brand: ANGIO-SEAL

## (undated) DEVICE — SYR POWER 150ML 8IN FILL TUBE --

## (undated) DEVICE — DRESSING HEMOSTATIC SFT INTVENT W/O SLT DBL WRP QUIKCLOT LF

## (undated) DEVICE — ANGIOGRAPHY KIT CUST [K0910930B] [MERIT MEDICAL SYSTEMS INC]

## (undated) DEVICE — CATHETER ETER CARD MULTIPAK MULTIPAK 5FR PERFORMA

## (undated) DEVICE — DECANTER FLD L85IN IV FLX TBNG CLMP DLP

## (undated) DEVICE — AIRLIFE™  ADULT CUSHION NASAL CANNULA WITH 7 FOOT (2.1 M) CRUSH-RESISTANT OXYGEN TUBING, AND U/CONNECT-IT ADAPTER: Brand: AIRLIFE™

## (undated) DEVICE — TREK CORONARY DILATATION CATHETER 2.50 MM X 12 MM / OVER-THE-WIRE: Brand: TREK

## (undated) DEVICE — CATH GUID COR EB35 6FR 100CM -- LAUNCHER

## (undated) DEVICE — CUSTOM KT PTCA INFL DEV K05 00053H

## (undated) DEVICE — CATH GUID COR JR4.0 6FR 100CM -- LAUNCHER

## (undated) DEVICE — Device: Brand: PROWATER

## (undated) DEVICE — PACK PROCEDURE SURG HRT CATH

## (undated) DEVICE — GUIDEWIRE VASC L145CM 0.035IN J TIP L3MM PTFE FIX COR NAMIC